# Patient Record
Sex: FEMALE | Race: WHITE | NOT HISPANIC OR LATINO | Employment: FULL TIME | URBAN - METROPOLITAN AREA
[De-identification: names, ages, dates, MRNs, and addresses within clinical notes are randomized per-mention and may not be internally consistent; named-entity substitution may affect disease eponyms.]

---

## 2017-01-03 ENCOUNTER — GENERIC CONVERSION - ENCOUNTER (OUTPATIENT)
Dept: OTHER | Facility: OTHER | Age: 37
End: 2017-01-03

## 2017-06-26 ENCOUNTER — TRANSCRIBE ORDERS (OUTPATIENT)
Dept: ADMINISTRATIVE | Facility: HOSPITAL | Age: 37
End: 2017-06-26

## 2017-06-26 DIAGNOSIS — R19.00 ABDOMINAL OR PELVIC SWELLING, MASS OR LUMP, UNSPECIFIED SITE: Primary | ICD-10-CM

## 2017-07-05 ENCOUNTER — HOSPITAL ENCOUNTER (OUTPATIENT)
Dept: ULTRASOUND IMAGING | Facility: HOSPITAL | Age: 37
Discharge: HOME | End: 2017-07-05
Attending: INTERNAL MEDICINE
Payer: COMMERCIAL

## 2017-07-05 DIAGNOSIS — R19.00 ABDOMINAL OR PELVIC SWELLING, MASS OR LUMP, UNSPECIFIED SITE: ICD-10-CM

## 2017-07-05 PROCEDURE — 76705 ECHO EXAM OF ABDOMEN: CPT

## 2017-07-05 PROCEDURE — 76830 TRANSVAGINAL US NON-OB: CPT

## 2017-07-05 PROCEDURE — 76856 US EXAM PELVIC COMPLETE: CPT

## 2018-01-03 ENCOUNTER — GENERIC CONVERSION - ENCOUNTER (OUTPATIENT)
Dept: OTHER | Facility: OTHER | Age: 38
End: 2018-01-03

## 2018-01-05 ENCOUNTER — GENERIC CONVERSION - ENCOUNTER (OUTPATIENT)
Dept: OTHER | Facility: OTHER | Age: 38
End: 2018-01-05

## 2018-01-08 ENCOUNTER — ALLSCRIPTS OFFICE VISIT (OUTPATIENT)
Dept: OTHER | Facility: OTHER | Age: 38
End: 2018-01-08

## 2018-01-09 NOTE — PROCEDURES
Assessment   1  Vaginal bleeding in pregnant patient at less than 20 weeks gestation (640 90) (O20 9)1      1 Amended By: Brianna Wiggins; Jan 08 2018 11:23 AM EST      Discussion/Summary   Discussion Summary:    LMP was 11/18/2017, due date is 08/25/2018 had brown discharge  Active Problems   1  Breast feeding status of mother (V24 1) (Z39 1)  2  Positive urine pregnancy test (V72 42) (Z32 01)  3  Postpartum exam (V24 2) (Z39 2)  4  Vaginal bleeding in pregnant patient at less than 20 weeks gestation (640 90) (O20 9)    Current Meds   1  Breast Pump Miscellaneous; Therapy: [id number] to Recorded  2  CVS DHA Prenatal CAPS; Therapy: (Recorded:06Ihe2584) to Recorded  3  Folic Acid CAPS; TAKE 1 CAPSULE Daily Per Patient; Therapy: (Recorded:70Ukr7382) to Recorded    Allergies   1  No Known Drug Allergies  2  Seasonal    Results/Data   85294 Transvaginal Ultrasound OB [facility]:      Procedure: [id number] pregnant uterus real time with image documentation, fetal and maternal evaluation, first trimester (<14 weeks 0 days), transvaginal approach: single or first gestation  Indication: EDC gestational age 10 3/11 weeks  Exam indication: bleeding  Findings:      Number of gestational sacs and fetuses: one  Gestational sac/fetal measurements appropriate for gestation: CRL is 081 cm equals 6 5/7 weeks with due date of 08/29/2018  Survey of visible fetal and placental anatomic structure cardiac motion is present  Qualitative assessment of amniotic fluid volume/gestational sac shape: nl       Exam of maternal uterus and adnexa: nl       Impression: viable IUP        Signatures    Electronically signed by : JELENA Murdock ; Jan 8 2018 11:22AM EST                       (Author)     Electronically signed by : JELENA Murdock ; Jan 8 2018 11:23AM EST                       (Author)

## 2018-01-11 NOTE — PROGRESS NOTES
AUG 17 2016         RE: Kevin Rai                                    To: [facility]  MR#: [id number]                                    [address]   : [address]   ENC: [id number]:VIANNEY Calzada, [address]   (Exam #: [id number])                           Fax: [phone]      The LMP of this 37year old,  G1, P0-0-0-0 patient was 2016, giving   her an ANN-MARIE of NOV 10 2016 and a current gestational age of 32 weeks 6 days   by dates  A sonographic examination was performed on AUG 17 2016 using   real time equipment  The ultrasound examination was performed using   abdominal & vaginal techniques  The patient has a BMI of 25 6  Her blood   pressure today was 126/66  Earliest ultrasound found in her record: 16  10w6d  ANN-MARIE 11/10/16      Cardiac motion was observed at 146 bpm       INDICATIONS      prior LEEP   low lying placenta   placental location      Exam Types      Transvaginal   Level I      RESULTS      Fetus # 1 of 1   Vertex presentation   Fetal growth appeared normal   Placenta Location = Anterior   No placenta previa   Placenta Grade = I      MEASUREMENTS (* Included In Average GA)      AC              23 9 cm        28 weeks 1 day * (51%)   BPD              6 7 cm        27 weeks 0 days* (22%)   HC              25 3 cm        27 weeks 1 day * (23%)   Femur            5 6 cm        29 weeks 4 days* (67%)      HC/AC           1 06   FL/AC           0 23   FL/BPD          0 84   Ceph Index      0 79   EFW (Ac/Fl/Hc)  1239 grams - 2 lbs 12 oz                 (56%)      THE AVERAGE GESTATIONAL AGE is 28 weeks 0 days +/- 14 days        AMNIOTIC FLUID      Q1: 4 5      Q2: 3 4      Q3: 4 5      Q4: 4 7   ANDREW Total = 17 1 cm   Amniotic Fluid: Normal      ANATOMY DETAILS      Visualized Appearing Sonographically Normal:   HEAD: (Calvarium, BPD Level, Lateral Ventricles, Cerebellum, Cisterna   Magna); TH  CAV : (Diaphragm); HEART: (Cardiac Axis, Cardiac   Position);    ABD  CAV , STOMACH, RIGHT KIDNEY, LEFT KIDNEY, BLADDER,   PLACENTA      ANATOMY COMMENTS      The cervix seen by transvaginal scan appears normal measuring 26-30 mms  There is no evidence for spontaneous funneling of the cervix seen  There   is no funneling seen in response to fundal or suprapubic pressure  IMPRESSION      Blanca IUP   28 weeks and 0 days by this ultrasound  (ANN-MARIE=NOV 9 2016)   Vertex presentation   Fetal growth appeared normal   Regular fetal heart rate of 146 bpm   Anterior placenta   No placenta previa      RECOMMENDATION      Growth Ultrasound: As indicated      RUTH Tatum S , R VIKY MOORE S  JELENA Stokes     Maternal-Fetal Medicine   Electronically signed 08/17/16 17:57

## 2018-01-11 NOTE — MISCELLANEOUS
Message   Recorded as Task   Date: 01/03/2017 09:43 AM, Created By: Mikhail Souza   Task Name: Miscellaneous   Assigned To: Les Nose   Regarding Patient: Gladis Sosa, Status: In Progress   CommentCheryle Eaton - 03 Jan 2017 9:43 AM     TASK CREATED  PT NEEDS A RTW NOTE FOR WORK FAXED -[phone]  Bashir Romeo - 03 Jan 2017 11:45 AM     TASK IN PROGRESS   Grecia Diaz - 03 Jan 2017 11:46 AM     TASK EDITED  lmtcb what kind of note she is ppar   [place] - 16 Jan 2017 11:53 AM     TASK EDITED  ppar note faxed        Active Problems    1  Breast feeding status of mother (V24 1) (Z39 1)   2  Postpartum exam (V24 2) (Z39 2)    Current Meds   1  Breast Pump Miscellaneous; Therapy: [id number] to Recorded   2  CVS DHA Prenatal CAPS; Therapy: (Recorded:48Kzz6467) to Recorded   3  Folic Acid CAPS; TAKE 1 CAPSULE Daily Per Patient; Therapy: (Recorded:10Wvd9722) to Recorded    Allergies    1  No Known Drug Allergies    2   Seasonal    Signatures   Electronically signed by : Matt Fischer, ; Jim  3 2017 11:53AM EST                       (Author)

## 2018-01-12 NOTE — PROGRESS NOTES
2016         RE: Fatuma Hackett                                    To: [facility]  MR#: [id number]                                    [address]   : [address]   ENC: [id number]                             Zheng, [address]   (Exam #: [id number])                           Fax: [phone]      The LMP of this 37year old,  G1, P0-0-0-0 patient was 2016, giving   her an ANN-MARIE of NOV 10 2016 and a current gestational age of 24 weeks 6 days   by dates  A sonographic examination was performed on 2016 using   real time equipment  The ultrasound examination was performed using   abdominal & vaginal techniques  The patient has a BMI of 23 4  Her blood   pressure today was 101/64        Earliest ultrasound found in her record: 16  10w6d  ANN-MARIE 11/10/16      Cardiac motion was observed at 144 bpm       INDICATIONS      prior LEEP   fetal anatomical survey      Exam Types      LEVEL II   Transvaginal      RESULTS      Fetus # 1 of 1   Breech presentation   Placenta Location = Anterior, low lying      MEASUREMENTS (* Included In Average GA)      AC              13 8 cm        18 weeks 6 days* (33%)   BPD              4 3 cm        19 weeks 0 days* (30%)   HC              16 3 cm        19 weeks 0 days* (22%)   Femur            3 2 cm        20 weeks 1 day * (47%)      Nuchal Fold      2 7 mm      Humerus          2 9 cm        19 weeks 3 days  (41%)   Radius           2 4 cm        19 weeks 6 days   Ulna             2 7 cm        20 weeks 0 days   Tibia            2 6 cm        19 weeks 1 day   (17%)   Fibula           2 6 cm        18 weeks 5 days   Foot             2 9 cm        18 weeks 6 days      Cerebellum       2 0 cm        19 weeks 6 days   Biorbit          3 1 cm        20 weeks 0 days   CisternaMagna    5 0 mm      HC/AC           1 18   FL/AC           0 23   FL/BPD          0 75   Ceph Index      0 74   EFW (Ac/Fl/Hc)   296 grams - 0 lbs 10 oz      THE AVERAGE GESTATIONAL AGE is 19 weeks 2 days +/- 10 days  AMNIOTIC FLUID      Q1: 3 9      Q2:          Q3:          Q4:   ANDREW Total = 3 9 cm   Largest Vertical Pocket = 3 9 cm      CERVICAL EVALUATION      SUPINE      Cervical Length: 3 30 cm      OTHER TEST RESULTS           Funneling?: No             Dynamic Changes?: No        Resp  To TFP?: No      ANATOMY      Head                                    Normal   Face/Neck                               Normal   Th  Cav  Normal   Heart                                   Normal   Abd  Cav  Normal   Stomach                                 Normal   Right Kidney                            Normal   Left Kidney                             Normal   Bladder                                 Normal   Abd  Wall                               Normal   Spine                                   Normal   Extrems                                 Normal   Genitalia                               Normal   Placenta                                Normal   Umbl  Cord                              Normal   Uterus                                  Normal   PCI                                     Normal      ANATOMY DETAILS      Visualized Appearing Sonographically Normal:   HEAD: (Calvarium, BPD Level, Cavum, Lateral Ventricles, Choroid Plexus,   Cerebellum, Cisterna Magna);    FACE/NECK: (Neck, Nuchal Fold, Profile,   Orbits, Nose/Lips, Palate, Face);    TH  CAV : (Diaphragm); HEART:   (Four Chamber View, Proximal Left Outflow, Proximal Right Outflow, 3   Vessel Trachea, Short Axis of Greater Vessels, Ductal Arch, Aortic Arch,   Interventricular Septum, Interatrial Septum, Cardiac Axis, Cardiac   Position);    ABD  CAV , STOMACH, RIGHT KIDNEY, LEFT KIDNEY, BLADDER, ABD     WALL, SPINE: (Cervical Spine, Thoracic Spine, Lumbar Spine, Sacrum);      EXTREMS: (Lt Humerus, Rt Humerus, Lt Forearm, Rt Forearm, Lt Hand, Rt   Hand, Lt Femur, Rt Femur, Lt Low Leg, Rt Low Leg, Lt Foot, Rt Foot);      GENITALIA (Female), PLACENTA, UMBL  CORD, UTERUS, PCI      ANATOMY COMMENTS      No fetal structural abnormality or ultrasound marker for aneuploidy is   identified on the Level II ultrasound study today  The views of the ***   are limited by ***  The genitalia were reviewed and found to be  female  The patient is  aware of the results of the fetal sex  Fetal growth and   amniotic fluid volume appear normal   Active movement of the fetal body &   extremities was seen  There is no suspicion of a subchorionic bleed  The   placental cord insertion was normal   The cervix seen by  transvaginal   scan appears normal measuring 3 3 cms  There is no evidence for   spontaneous funneling of the cervix seen  ADNEXA      The left ovary appeared normal and measured 1 9 x 2 3 x 1 5 cm with a   volume of 3 4 cc  The right ovary appeared normal and measured 3 2 x 2 3 x   2 5 cm with a volume of 9 6 cc  IMPRESSION      Blanca IUP   19 weeks and 2 days by this ultrasound  (ANN-MARIE=2016)   Breech presentation   Normal anatomy survey   Regular fetal heart rate of 144 bpm   Anterior, low lying placenta      GENERAL COMMENT         I had the pleasure of seeing Darcy Hatfield in the  center on    for level II ultrasound  She had a prior LEEP procedure and today her   cervix appeared very normal  Her BMI is 23 and her blood pressure today   was 101/64  She denies any pregnancy complications  Today's ultrasound was reassuring  A viable fetus was seen  The placenta   is anterior and low-lying as it does come to within 1 cm of the internal   os however there is no evidence of a placenta previa  Amniotic fluid   appeared normal  Fetal growth appeared very appropriate and correlated   well with her due date  There were no obvious anomalies seen today  The   anatomic survey was completed today   There were no Down syndrome markers   seen  Both maternal ovaries were seen and appeared normal  There were no   obvious subchorionic hematomas or uterine myomas  Her cervix was seen   transvaginally and appeared normal in length with no evidence of funneling   or dynamic change  The placental cord insertion were seen and was normal   in location  The patient appeared well-nourished, well-developed, and in no apparent   distress  Her uterus is nontender  Her abdomen was gravid and nontender  The anatomic survey was completed today and there were no obvious anatomic   abnormalities  We discussed kick counts in the office today  We discussed the 10 kicks in   2 hour rule  I asked her to call your office if criteria are not met  She   should continue to do her kick counts on a daily basis  Kick counts should   begin at 28 weeks  MISSED ANATOMY: None      NEW FINDINGS ON TODAY'S ULTRASOUND: Anterior low lying placenta which   comes to within 1 cm of the internal os and thus a follow-up ultrasound   appears indicated      IN SUMMARY: Today's ultrasound was overall very reassuring for Del Garcia  The   fetus showed no obvious birth defects nor any Down syndrome markers and   the anatomic survey was completed  Her cervix was 33 mm in length however   the placenta does come down somewhat low and comes to within 1 cm of the   internal os and thus a follow-up ultrasound appears indicated  I did   recommend an ultrasound in 3 weeks given her anterior placenta  Overall I   was still very reassured by today's ultrasound  Thank you kindly for this   referral          Total face-to-face time with the patient, excluding ultrasound time was 15   minutes with more than 50% of the time devoted to counseling and   coordination of care  Thank you very much for allowing me to participate in the care of your   patient  If you have any questions or concerns about today's visit, please   do not hesitate to call me  Sincerely,      Nestora Barthel, M D  Maternal Fetal Medicine      Liddie Parry, R D M S Nestora Barthel, M D     Maternal-Fetal Medicine   Electronically signed 06/22/16 13:14

## 2018-01-13 NOTE — PROGRESS NOTES
MAY 5 2016         RE: Gerry Tee                                    To: [facility]  MR#: [id number]                                    [address]   : [address]   ENC: [id number]:ABIGAIL Calzada, [address]   (Exam #: [id number])                           Fax: [phone]      The LMP of this 37year old,  G1, P0-0-*-0 patient was 2016, giving   her an ANN-MARIE of NOV 10 2016 and a current gestational age of 17 weeks 0 days   by dates  A sonographic examination was performed on MAY 5 2016 using real   time equipment  The ultrasound examination was performed using abdominal &   vaginal techniques  The patient has a BMI of 23 0  Her blood pressure   today was 109/67  Earliest ultrasound found in her record: 16  10w6d  ANN-MARIE 11/10/16      Patient is a  1 para 0  She currently is on prenatal vitamins and   folic acid and denies any allergies to medication  She denies any use of   cigarettes, alcohol, or drugs in pregnancy  She reports a past medical   history significant for HPV which required a LEEP in  that measured   1 8 x 1 8 x 1 5 cm on the pathology report  She also reports a prior   surgical history that includes repair of an abdominal hernia as well as   her LEEP  She denies any significant first generation family history of   hypertension, diabetes, thrombosis or congenital anomalies  Multiple longitudinal and transverse sections revealed a hogue   intrauterine pregnancy with the fetus in variable presentation  The   placenta is anterior in implantation, grade 0 in appearance        Cardiac motion was observed at 157 bpm       INDICATIONS      first trimester genetic screening   prior LEEP      Exam Types      Level I   Transvaginal      RESULTS      Fetus # 1 of 1   Variable presentation   Fetal growth appeared normal      MEASUREMENTS (* Included In Average GA)      CRL detecting all forms of fetal congenital   abnormalities  She denies any vaginal bleeding or uterine cramping/contractions  Exam shows she is comfortable and her abdomen is non tender  Today's ultrasound findings and suggested follow-up were discussed in   detail with the patient  The Sequential Screen was discussed in detail,   including the sensitivities for detection of Down syndrome, Trisomy 18,   and open neural tube defects  We also discussed the Integrated screen as   the fetal position made obtaining an NT measurement impossible  The   integrated screen utilizes the first trimester blood work ( without an NT   measurement) and the multiple marker screening at 16-18 weeks to assess   the patients risk just like the sequential screen does  Only one result   though returns after all the blood work is completed  The patient   underwent fingerstick blood collection for hCG and LETTY-A to complete the   initial component of the Integrated Screen  The final result will be back   after the multiple marker serum screening at 16-18 weeks is completed  Follow up recommended:   1  Follow-up multiple marker serum screening at 16-18 weeks' gestation is   recommended to complete the Integrated Screen  2  Fetal Level II ultrasound imaging is recommended at 19-20 weeks'   gestation  3  LEEP does not appear to increase the risk of  mortality or of    delivery less then or =34 weeks as long as her leep is < 1 cm  There may be an increased risk of late  delivery in women who have   undergone LEEP  Again the depth of excision appears to influence the risk   of  delivery  Since her leep was deeper then 1 cm will plan a TVS   at 16-17 weeks  If this pregnancy is not complicated by a  delivery   then future pregnancies will not require cervical surveillance beyond the   normal 20 week screen  RUTH Baeza M D     Maternal-Fetal Medicine   Electronically signed 05/06/16 11:26           Electronically signed by:Flip SALMON    May  7 2016  6:49AM EST

## 2018-01-13 NOTE — PROGRESS NOTES
MAY 26 2016         RE: Yobani Gonzalez                                    To: [facility]  MR#: [id number]                                    [address]   : [address]   ENC: [id number] Calzada, [address]   (Exam #: [id number])                           Fax: [phone]      The LMP of this 37year old,  G1, P0-0-0-0 patient was 2016, giving   her an ANN-MARIE of NOV 10 2016 and a current gestational age of 12 weeks 0 days   by dates  A sonographic examination was performed on MAY 26 2016 using   real time equipment  The ultrasound examination was performed using   abdominal & vaginal techniques  The patient has a BMI of 23 2  Her blood   pressure today was 99/56  Earliest ultrasound found in her record: 16  10w6d  ANN-MARIE 11/10/16      Padilla Poll has no complaints  She denies abdominal or pelvic pain, vaginal   bleeding, mucoid vaginal discharge, or suspected PPROM  Cardiac motion was observed at 145 bpm       INDICATIONS      prior LEEP      Exam Types      Transvaginal      RESULTS      Fetus # 1 of 1   Variable presentation   Placenta Location = Anterior   Placenta Grade = 0      AMNIOTIC FLUID         Largest Vertical Pocket = 3 9 cm   Amniotic Fluid: Normal      CERVICAL EVALUATION      The cervix appeared normal (Ultrasound Examination)  SUPINE      Cervical Length: 3 30 cm      OTHER TEST RESULTS           Funneling?: No             Dynamic Changes?: No        Resp  To TFP?: No      IMPRESSION      Blanca IUP   Variable presentation   Regular fetal heart rate of 145 bpm   Anterior placenta      GENERAL COMMENT      The cervix is normal in appearance by transvaginal sonography  The   cervical length is normal   Cervical debris is not present  Cervical   funneling is not present  Neither provocative nor dynamic change is   appreciated        Today's ultrasound findings and suggested follow-up were discussed in   detail with Karlos Hager  She will return to the [facility] [address] [facility] at the [facility]  in 4 weeks for   level II ultrasound evaluation and cervical sonography  The face to face time, in addition to time spent discussing ultrasound   results, was 10 minutes, greater than 50% of which was spent during   counseling and coordination of care  RUTH Baeza M D     Maternal-Fetal Medicine   Electronically signed 05/26/16 16:07

## 2018-01-14 NOTE — RESULT NOTES
Verified Results  (1) SEQUENTIAL SCREEN 2 [id number] 04:36PM Chloé Jacinto     Test Name Result Flag Reference   SCAN RESULT      SEE WRITTEN REPORT FROM [facility]

## 2018-01-15 NOTE — PROGRESS NOTES
2016         RE: Emily Stager                                    To: [facility]  MR#: [id number]                                    [address]   : [address]   ENC: [id number]:DOROTHEA Abbasi, [address]   (Exam #: [id number])                           Fax: [phone]      The LMP of this 37year old,  G1, P0-0-0-0 patient was 2016, giving   her an ANN-MARIE of NOV 10 2016 and a current gestational age of 25 weeks 6 days   by dates  A sonographic examination was performed on 2016 using   real time equipment  The ultrasound examination was performed using   abdominal & vaginal techniques  The patient has a BMI of 24 1  Her blood   pressure today was 109/68  Earliest ultrasound found in her record: 16  10w6d  ANN-MARIE 11/10/16      Cardiac motion was observed at 142 bpm       INDICATIONS      prior LEEP   placental location      Exam Types      Transvaginal      RESULTS      Fetus # 1 of 1   Vertex presentation   Placenta Location = Anterior, low lying   Placenta Grade = I      AMNIOTIC FLUID         Largest Vertical Pocket = 5 1 cm   Amniotic Fluid: Normal      ANATOMY COMMENTS      The cervix seen by transvaginal scan appears normal measuring 33 mms  There is no evidence for spontaneous funneling of the cervix seen  There   is no funneling seen in response to fundal or suprapubic pressure  the   placenta measures approximately 1 5cm from the internal os  IMPRESSION      Blanca IUP   Vertex presentation   Regular fetal heart rate of 142 bpm   Anterior, low lying placenta      GENERAL COMMENT      I had the pleasure of seeing Pattinathalyhoracio Stager in the [address] [place] [address] today for   a transvaginal assessment of her cervix  She denies vaginal bleeding,   uterine contractions, pelvic pressure, or mucoid type discharge        Note that the placenta is 1 5 cm away from the internal os which is reassuring  Her cervical length today was 33 mm which is also reassuring  There was no signs of funneling or dynamic changes of her cervix  There   was no change in the appearance of her cervix with transfundal or   suprapubic pressure  Thus her placenta is still 1 5 cm away from the internal os and I did   recommend a follow-up scan in 4 weeks to further review placental   location  Thank you kindly for this referral  Total face-to-face time with   the patient, excluding ultrasound time was 10 minutes with more than 50%   of the time devoted to counseling and coordination of care  RUTH Maria M D     Maternal-Fetal Medicine   Electronically signed 07/13/16 16:55

## 2018-01-15 NOTE — PROGRESS NOTES
Plan  Pregnancy, supervision of first    · (B) PAP/CT/GC (RFLX HPV PLUS when ASC-US); Status: In Progress - Specimen/Data  Collected,Retrospective Authorization;   Done: 20Apr2016   Perform:BioReference; Due:20Apr2017; Last Updated Alana Rodriguez; 4/20/2016 10:34:02 AM;Ordered; For:Pregnancy, supervision of first; Ordered By:Flip Larios;  : 2/4/2016    Active Problems    1  H/O LEEP (loop electrosurgical excision procedure) of cervix complicating pregnancy   (654 60,V45 89) (O34 40,Z98 89)   2  Denied: History of Menopause   3  Need for prophylactic vaccination with combined diphtheria-tetanus-pertussis (DTaP)   vaccine (V06 1) (Z23)   4  Possible exposure to STD (V01 6) (Z20 2)   5  Pregnancy, supervision of first (V22 0) (Z34 00)    Past Medical History    1  History of Dysmenorrhea (625 3) (N94 6)   2  History of Encounter for preconception consultation (V26 49) (Z31 69)   3  History of Encounter for routine gynecological examination (V72 31) (Z01 419)   4  History of Encounter for routine gynecological examination with Papanicolaou smear of   cervix (V72 31,V76 2) (Z01 419)   5  History of Genital warts (078 11) (A63 0)   6  History of Hernia (553 9) (K46 9)   7  History of human papillomavirus infection (V12 09) (Z86 19)   8  Denied: History of Menopause   9  History of Mild cervical dysplasia (622 11) (N87 0)   10  History of Moderate dysplasia of cervix (ALISHA II) (622 12) (N87 1)    Surgical History    1  History of Cervical Loop Electrosurgical Excision (LEEP)   2  History of Colposcopy Cervix With Biopsy(S)   3  History of Oral Surgery Tooth Extraction   4  History of Oral Surgery Tooth Extraction   5  History of Pap smear with atypical squamous cells of undetermined sign (ASC-US)   (796 9) (R89 6)   6  History of Ventral Hernia Repair    Family History  Mother    1  Family history of Malignant Melanoma Of The Skin (V16 8)  Father    2   Family history of Glomus Jugulare Tumor  Maternal Grandmother    3  Family history of Diabetes Mellitus (V18 0)   4  Family history of Heart Disease (V17 49)  Paternal Grandmother    5  Family history of Alzheimer Disease  Paternal Grandfather    6  Family history of Colon Cancer (V16 0)  Paternal Aunt    9  Family history of Breast Cancer (V16 3)    Social History    · Almost always uses seat belt   · Being A Social Drinker   · Daily Coffee Consumption (1  Cups/Day)   · Denied: History of Drug Use   · Exercising Regularly   ·    · Never A Smoker    Current Meds   1  CVS DHA Prenatal CAPS; Therapy: (Recorded:20Apr2016) to Recorded    Allergies    1  No Known Drug Allergies    2  Seasonal    Vitals  Vital Signs [Data Includes: Current Encounter]    Recorded: 31MQZ6402 68:92XX   Systolic 449, LUE, Sitting   Diastolic 62, LUE, Sitting   Height 5 ft 2 in   Weight 125 lb    BMI Calculated 22 86   BSA Calculated 1 57   LMP 05RUH2304     Results/Data  89571 Transvaginal Ultrasound OB [facility]:   Procedure: 81326-HKOJPIZAQC pregnant uterus real time with image documentation, fetal and maternal evaluation, first trimester (<14 weeks 0 days), transvaginal approach: single or first gestation  The study was done today in the office  Indication: EDC gestational age 5 weeks  Exam indication: DATING AND VIABILITY  Findings:   Number of gestational sacs and fetuses: ONE  Gestational sac/fetal measurements appropriate for gestation: CRL=4 01=21Z1Y=94/10/16, + HEART BEAT  Survey of visible fetal and placental anatomic structure NORMAL  Qualitative assessment of amniotic fluid volume/gestational sac shape: NORMAL  Exam of maternal uterus and adnexa: NORMAL  Impression: VIABLE IUP @ 10W6D  Future Appointments    Date/Time Provider Specialty Site   06/15/2016 02:40 PM JELENA Kim   Obstetrics/Gynecology [facility]   07/15/2016 03:40 PM Sharron Turner DO Obstetrics/Gynecology [facility]   05/19/2016 03:00 PM Artur Olson, [facility] Obstetrics/Gynecology [facility] & [address], [address]     Signatures   Electronically signed by : JELENA Blanca ; Apr 20 2016 11:21AM EST                       (Author)

## 2018-01-16 NOTE — MISCELLANEOUS
Message  Pt is 34 wks  Calling today with a left sided vulvar varicosity  Pt reports feeling very sore and uncomfortable on the left vulva  Pt looked in the mirror after shaving today and she did see a dark purple, bulging vein  Pt states it vein feels hot and is sore to the touch  Pts Sister is getting  this weekend and she is worried about the pressure of standing for that time  Advised pt to use Belly Band for support, decrease time sitting with direct downward pressure, sleep with pillow between legs  Pt also advised to avoid shaving in that area  Pt scheduled today with [facility] for evaluation due to increased heat and discomfort and for pts peace of mind  Active Problems    1  Abnormal glucose in pregnancy, antepartum (648 83) (O99 810)   2  Encounter for supervision of other normal pregnancy in third trimester (V22 1) (Z34 83)   3  H/O LEEP (loop electrosurgical excision procedure) of cervix complicating pregnancy   (654 60,V45 89) (O34 40,Z98 89)   4  Need for prophylactic vaccination with combined diphtheria-tetanus-pertussis (DTaP)   vaccine (V06 1) (Z23)   5  Pregnancy (V22 2) (Z33 1)    Current Meds   1  CVS DHA Prenatal CAPS; Therapy: (Recorded:35Hrl1761) to Recorded   2  Folic Acid CAPS; TAKE 1 CAPSULE Daily Per Patient; Therapy: (Recorded:32Tyg7931) to Recorded    Allergies    1  No Known Drug Allergies    2   Seasonal    Signatures   Electronically signed by : Zeynep Washington, ; Sep 29 2016  9:22AM EST                       (Author)

## 2018-01-16 NOTE — MISCELLANEOUS
Message   Recorded as Task   Date: 08/24/2016 02:14 PM, Created By: Marina Alonzo   Task Name: Follow Up   Assigned To: Keshia Redman   Regarding Patient: Alexus Hemphill, Status: In Progress   CommentLawerence Brennen - 24 Aug 2016 2:14 PM     TASK CREATED  schedule 3hr GTT   Tempie Jeans - 24 Aug 2016 3:47 PM     TASK IN PROGRESS   Tempie Jeans - 24 Aug 2016 3:47 PM     TASK EDITED  formerly [facility] for pt to call office  Elizabeth Mcgee - 24 Aug 2016 4:04 PM     TASK REPLIED TO: Previously Assigned To [address] with pt  She has rx from apt with [facility]  Instructions given  Will go in the next week  Active Problems    1  H/O LEEP (loop electrosurgical excision procedure) of cervix complicating pregnancy   (654 60,V45 89) (O34 40,Z98 89)   2  Need for prophylactic vaccination with combined diphtheria-tetanus-pertussis (DTaP)   vaccine (V06 1) (Z23)   3  Pregnancy, supervision of first (V22 0) (Z34 00)    Current Meds   1  CVS DHA Prenatal CAPS; Therapy: (Recorded:99Ruw8201) to Recorded   2  Folic Acid CAPS; TAKE 1 CAPSULE Daily Per Patient; Therapy: (Recorded:54Xsr5713) to Recorded    Allergies    1  No Known Drug Allergies    2   Seasonal    Signatures   Electronically signed by : Dimas Monroe, ; Aug 24 2016  4:04PM EST                       (Author)

## 2018-01-16 NOTE — MISCELLANEOUS
Message  Pt called office after apt with [facility]  Pt would like to know if she can still have intercourse if desired  Per [facility]  it is ok for pt to continue to have intercourse  Pt is aware  Active Problems    1  Abnormal glucose in pregnancy, antepartum (648 83) (O99 810)   2  Encounter for supervision of other normal pregnancy in third trimester (V22 1) (Z34 83)   3  H/O LEEP (loop electrosurgical excision procedure) of cervix complicating pregnancy   (654 60,V45 89) (O34 40,Z98 89)   4  Need for prophylactic vaccination with combined diphtheria-tetanus-pertussis (DTaP)   vaccine (V06 1) (Z23)   5  Pregnancy (V22 2) (Z33 1)    Current Meds   1  CVS DHA Prenatal CAPS; Therapy: (Recorded:58Tju8654) to Recorded   2  Folic Acid CAPS; TAKE 1 CAPSULE Daily Per Patient; Therapy: (Recorded:90Ccn6386) to Recorded    Allergies    1  No Known Drug Allergies    2   Seasonal    Signatures   Electronically signed by : Yanira Mcclain, ; Sep 29 2016  3:02PM EST                       (Author)

## 2018-01-22 ENCOUNTER — LAB REQUISITION (OUTPATIENT)
Dept: LAB | Facility: HOSPITAL | Age: 38
End: 2018-01-22
Payer: COMMERCIAL

## 2018-01-22 ENCOUNTER — ALLSCRIPTS OFFICE VISIT (OUTPATIENT)
Dept: OTHER | Facility: OTHER | Age: 38
End: 2018-01-22

## 2018-01-22 VITALS
SYSTOLIC BLOOD PRESSURE: 108 MMHG | HEIGHT: 62 IN | DIASTOLIC BLOOD PRESSURE: 64 MMHG | BODY MASS INDEX: 23.15 KG/M2 | WEIGHT: 125.8 LBS

## 2018-01-22 DIAGNOSIS — Z34.83 ENCOUNTER FOR SUPERVISION OF OTHER NORMAL PREGNANCY, THIRD TRIMESTER: ICD-10-CM

## 2018-01-22 DIAGNOSIS — Z34.81 ENCOUNTER FOR SUPERVISION OF OTHER NORMAL PREGNANCY, FIRST TRIMESTER: ICD-10-CM

## 2018-01-22 PROCEDURE — 87491 CHLMYD TRACH DNA AMP PROBE: CPT | Performed by: OBSTETRICS & GYNECOLOGY

## 2018-01-22 PROCEDURE — 87591 N.GONORRHOEAE DNA AMP PROB: CPT | Performed by: OBSTETRICS & GYNECOLOGY

## 2018-01-23 NOTE — MISCELLANEOUS
Message  returned pts' p c - L/M for pt to call office prior to 630pm today  Active Problems    1  Breast feeding status of mother (V24 1) (Z39 1)   2  Postpartum exam (V24 2) (Z39 2)   3  Vaginal bleeding in pregnant patient at less than 20 weeks gestation (640 90) (O20 9)    Current Meds   1  Breast Pump Miscellaneous; Therapy: 13VTT3873 to Recorded   2  CVS DHA Prenatal CAPS; Therapy: (Recorded:00Qfz2244) to Recorded   3  Folic Acid CAPS; TAKE 1 CAPSULE Daily Per Patient; Therapy: (Recorded:93Hqp9763) to Recorded    Allergies    1  No Known Drug Allergies    2   Seasonal    Signatures   Electronically signed by : Adelaida Gill RN; Jim  3 2018  4:02PM EST                       (Author)

## 2018-01-23 NOTE — MISCELLANEOUS
Message   Recorded as Task   Date: 01/05/2018 04:01 PM, Created By: Greg Frederick   Task Name: Care Coordination   Assigned To: Greg Frederick   Regarding Patient: Nestor Iglesias, Status: In Progress   Elly Lomeli - 05 Jan 2018 4:01 PM     TASK CREATED  Caller: Self; Care Coordination; [phone] (Home); [phone] (Work)  Patient called for HCG results  She went to [facility]  Verbal from [facility]- [id number]  Spoke with Renan Welch she is to have a early ultrasound  Will schedule  Greg Frederick - 05 Jan 2018 4:08 PM     TASK IN PROGRESS   Greg Frederick - 05 Jan 2018 4:14 PM     TASK EDITED  Patient was breastfeeding and had a LMP on 11/18/17  She was having breast tenderness and abnormal spotting  Greg Frederick - 05 Jan 2018 4:18 PM     TASK EDITED  Patient scheduled with [facility] on 1/8/18 @ 10:40 for early ultrasound  Active Problems    1  Breast feeding status of mother (V24 1) (Z39 1)   2  Positive urine pregnancy test (V72 42) (Z32 01)   3  Postpartum exam (V24 2) (Z39 2)   4  Vaginal bleeding in pregnant patient at less than 20 weeks gestation (640 90) (O20 9)    Current Meds   1  Breast Pump Miscellaneous; Therapy: 04NFW5839 to Recorded   2  CVS DHA Prenatal CAPS; Therapy: (Recorded:32Arh2241) to Recorded   3  Folic Acid CAPS; TAKE 1 CAPSULE Daily Per Patient; Therapy: (Recorded:36Sbj6175) to Recorded    Allergies    1  No Known Drug Allergies    2  Seasonal    Signatures   Electronically signed by :  Amy Ramos, ; Jan 5 2018  4:19PM EST                       (Author)

## 2018-01-23 NOTE — MISCELLANEOUS
Message   Recorded as Task   Date: 01/03/2018 04:26 PM, Created By: Louise Abraham   Task Name: Follow Up   Assigned To: Iqra Lipscomb   Regarding Patient: Alexandre Yuen, Status: In Progress   Comment:    Iqra Lipscomb - 03 Jan 2018 4:26 PM     TASK CREATED  Patient returned Jan's call - she delivered in November 2016 - has been breast feeding till November 2017  In Jan 2017 patient got her period - has been getting it every month like clock work - around the 17th of every month  In November she didn't get her period - took 2 HPT, both positive  Called us, was told to do a HCG Quant - came back Negative  Few days later had some spotting  IN December, patient again did not have her period  Waited for few days and did HPT - was positive  Is currently having some spotting again, but it is brown in color  No cramping  Patient is sexually active, no birth control used  Wants another baby  Faxed order for HCG to [facility] on [facility] to be done tomorrow  When results are available, patient will be notified  Iqra Lipscomb - 03 Jan 2018 4:26 PM     TASK IN PROGRESS   Iqra Lipscomb - 03 Jan 2018 4:28 PM     TASK EDITED  Will fax order tomorrow, office currently closed  Iqra Lipscomb - 03 Jan 2018 4:37 PM     TASK EDITED  Patient called back - she will be going to [facility] in [place] - she gave me the number  Called and faxed order for the patient to do tomorrow  Active Problems    1  Breast feeding status of mother (V24 1) (Z39 1)   2  Positive urine pregnancy test (V72 42) (Z32 01)   3  Postpartum exam (V24 2) (Z39 2)   4  Vaginal bleeding in pregnant patient at less than 20 weeks gestation (640 90) (O20 9)    Current Meds   1  Breast Pump Miscellaneous; Therapy: 96QQG5600 to Recorded   2  CVS DHA Prenatal CAPS; Therapy: (Recorded:20Apr2016) to Recorded   3  Folic Acid CAPS; TAKE 1 CAPSULE Daily Per Patient; Therapy: (Recorded:25Yiv5505) to Recorded    Allergies    1   No Known Drug Allergies    2   Seasonal    Signatures   Electronically signed by : Lavinia Navarrete, ; Jim  3 2018  4:37PM EST                       (Author)

## 2018-01-25 LAB
CHLAMYDIA DNA CVX QL NAA+PROBE: NORMAL
N GONORRHOEA DNA GENITAL QL NAA+PROBE: NORMAL

## 2018-02-01 ENCOUNTER — TELEPHONE (OUTPATIENT)
Dept: OBGYN CLINIC | Facility: CLINIC | Age: 38
End: 2018-02-01

## 2018-02-02 ENCOUNTER — TELEPHONE (OUTPATIENT)
Dept: OBGYN CLINIC | Facility: CLINIC | Age: 38
End: 2018-02-02

## 2018-02-02 NOTE — TELEPHONE ENCOUNTER
Pt called office today, left voicemail message  Pt's ANN-MARIE is 8/25/18, GA 10-6  Pt saw Dr Laura Gallego on 1/22/18  Pt states she recently had prenatal blood work done, wants to know if results have returned from the lab? Pt can be reached @ [phone]

## 2018-02-09 ENCOUNTER — CONSULT (OUTPATIENT)
Dept: PERINATAL CARE | Facility: CLINIC | Age: 38
End: 2018-02-09
Payer: COMMERCIAL

## 2018-02-09 ENCOUNTER — ROUTINE PRENATAL (OUTPATIENT)
Dept: PERINATAL CARE | Facility: CLINIC | Age: 38
End: 2018-02-09
Payer: COMMERCIAL

## 2018-02-09 VITALS
WEIGHT: 127.3 LBS | HEIGHT: 61 IN | DIASTOLIC BLOOD PRESSURE: 65 MMHG | SYSTOLIC BLOOD PRESSURE: 99 MMHG | BODY MASS INDEX: 24.03 KG/M2 | HEART RATE: 69 BPM

## 2018-02-09 DIAGNOSIS — Z36.82 ENCOUNTER FOR NUCHAL TRANSLUCENCY TESTING: ICD-10-CM

## 2018-02-09 DIAGNOSIS — Z31.5 ENCOUNTER FOR PROCREATIVE GENETIC COUNSELING: Primary | ICD-10-CM

## 2018-02-09 DIAGNOSIS — O09.521 MATERNAL AGE 35+, MULTIGRAVIDA, FIRST TRIMESTER: ICD-10-CM

## 2018-02-09 DIAGNOSIS — Z3A.11 11 WEEKS GESTATION OF PREGNANCY: ICD-10-CM

## 2018-02-09 DIAGNOSIS — O09.521 ELDERLY MULTIGRAVIDA IN FIRST TRIMESTER: Primary | ICD-10-CM

## 2018-02-09 PROBLEM — O43.101: Status: ACTIVE | Noted: 2018-02-09

## 2018-02-09 PROCEDURE — 76801 OB US < 14 WKS SINGLE FETUS: CPT | Performed by: OBSTETRICS & GYNECOLOGY

## 2018-02-09 PROCEDURE — 76813 OB US NUCHAL MEAS 1 GEST: CPT | Performed by: OBSTETRICS & GYNECOLOGY

## 2018-02-09 PROCEDURE — 99204 OFFICE O/P NEW MOD 45 MIN: CPT | Performed by: GENETIC COUNSELOR, MS

## 2018-02-09 RX ORDER — PRENATAL VIT 91/IRON/FOLIC/DHA 28-975-200
1 COMBINATION PACKAGE (EA) ORAL DAILY
COMMUNITY
End: 2022-01-12 | Stop reason: ALTCHOICE

## 2018-02-09 NOTE — PROGRESS NOTES
Maternal-Fetal Medicine: Ms Will South was seen today in the [address] today for nuchal translucency ultrasound  Please see ultrasound report under "OB Procedures" tab in EPIC   Thank you for the referral and please don't hesitate to contact our office with any concerns or questions      Sincerely,  Sherita Potts MD  Attending Physician, Maternal-Fetal Medicine

## 2018-02-09 NOTE — PROGRESS NOTES
Genetic Counseling Note        Courtney Robbins     Appointment Date:  2/9/2018  Referred By: Livia Thibodeaux DO  YOB: 1980  Partner:  Aristides Emmanuel    Pregnancy History: [id number]  Estimated Date of Delivery: 8/25/18  Estimated Gestational Age: 5 weeks 6 days     Genetic Counseling:advanced maternal age    Sang Taylor is a(n) 30 y o  female who is here to discuss maternal age related risk for aneuploidy    Issues Discussed:    Average population risk: 3-4% in the average pregnancy of serious condition or birth defect  2-3% risk of mental retardation  Not all detected by prenatal testing  Chromosomal: non-disjunction- 1/178 risk overall, 1/384 risk for Down syndrome  Mode of inheritance/mechanism: CF, SMA, Fragile X and hemoglobinopathies  Maternal age  Risk of aneuploidy    Options Discussed:    Amniocentesis: risks and limitations discussed  CVS: risks and limitations discussed  Ethnic screening discussed: clinical and genetic basis of CF, SMA, Fragile X, homeoglobinopathies  Variability and treatment addressed  Level II ultrasound to screen for structural anomalies  Nuchal translucency/1st trimester serum screen: goals and limitations discussed  Serum AFP screen recommended at 15-17 weeks to check for open neural tube defects  Cell free fetal DNA testing    Additional Information / Impression / Plan / Tests Ordered:  After discussing the available prenatal diagnostic and screening procedures Sang Taylor elected to decline prenatal diagnostic testing at this time  She is scheduled to have nuchal translucency ultrasound following the genetic counseling session and planning to pursue level 2 ultrasound evaluation at the appropriate time  She is still undecided regarding sequential screening versus cell free DNA and would like additional time to confirm that decision      During our counseling session histories were taken on the patient's family and her 's family both of which were negative for any prior known cases of intellectual disability, birth defects or single gene disorders  [place] does report having a paternal aunt with early-onset breast cancer and an optic nerve tumor in her father  She was provided with the contact information for the cancer risk evaluation program for consideration in the future given this history  Patient reports being of [address] [place] [address] in descent and that her  is of [facility] [place] descent  She denies either of them having any known [place] ancestry  Carrier screening for CF, SMA, Fragile X and hemoglobinopathies was discussed  Lisa Al indicated that she wished to have further time to consider her carrier screening options  She was provided with CPT an ICD 10 codes for checking insurance coverage and instructed to get back to me if she elects to move forward with testing  Hemoglobinopathy screening is recommended if not previously performed  Lastly, we discussed the fact that it is important to keep in mind that everyone in the general population regardless of age, family history, or medical background has approximately a 3% risk of having a child with some type of her defect, genetic disease or intellectual disability  Currently there are no tests available to rule out all birth defects or health problems              Time spent with Genetic Counselor: 45 minutes

## 2018-02-21 ENCOUNTER — ROUTINE PRENATAL (OUTPATIENT)
Dept: OBGYN CLINIC | Facility: CLINIC | Age: 38
End: 2018-02-21

## 2018-02-21 VITALS — DIASTOLIC BLOOD PRESSURE: 64 MMHG | WEIGHT: 126 LBS | SYSTOLIC BLOOD PRESSURE: 108 MMHG | BODY MASS INDEX: 23.81 KG/M2

## 2018-02-21 DIAGNOSIS — Z34.82 ENCOUNTER FOR SUPERVISION OF NORMAL PREGNANCY IN MULTIGRAVIDA IN SECOND TRIMESTER: Primary | ICD-10-CM

## 2018-02-21 PROCEDURE — 87491 CHLMYD TRACH DNA AMP PROBE: CPT | Performed by: PHYSICIAN ASSISTANT

## 2018-02-21 PROCEDURE — PNV: Performed by: PHYSICIAN ASSISTANT

## 2018-02-21 PROCEDURE — 87591 N.GONORRHOEAE DNA AMP PROB: CPT | Performed by: PHYSICIAN ASSISTANT

## 2018-02-21 NOTE — PROGRESS NOTES
Subjective  Julien Acosta is being seen today for her first obstetrical visit  This is a planned pregnancy  She is at 13w4d gestation  Her obstetrical history is significant for C/S for failure to progress by Dr Bridgett Fernandez  Pregnancy history fully reviewed  Menstrual History:  OB History      Para Term  AB Living    2 1 1     1    SAB TAB Ectopic Multiple Live Births          0 1         Patient's last menstrual period was 2017  The following portions of the patient's history were reviewed and updated as appropriate: allergies, current medications, past family history, past medical history, past social history, past surgical history and problem list     Review of Systems  Review of Systems     Objective    Physical Exam     Assessment     Pregnancy at 15 and 4/7 weeks      Plan  Problem List Items Addressed This Visit     Encounter for supervision of normal pregnancy in multigravida in second trimester - Primary     RTO 4 weeks  OebtyneE59 results pending  Desires repeat C/S            Genetic testing discussed: saw [facility] had [id number]  Level 2 US scheduled? Yes  Reviewed expected prenatal course, office orientation and reasons to call  Follow up in 4 weeks

## 2018-02-21 NOTE — PROGRESS NOTES
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

## 2018-02-23 LAB
CHLAMYDIA DNA CVX QL NAA+PROBE: NORMAL
N GONORRHOEA DNA GENITAL QL NAA+PROBE: NORMAL

## 2018-02-26 ENCOUNTER — TELEPHONE (OUTPATIENT)
Dept: PERINATAL CARE | Facility: CLINIC | Age: 38
End: 2018-02-26

## 2018-03-22 ENCOUNTER — TRANSCRIBE ORDERS (OUTPATIENT)
Dept: LAB | Facility: HOSPITAL | Age: 38
End: 2018-03-22

## 2018-03-22 ENCOUNTER — ROUTINE PRENATAL (OUTPATIENT)
Dept: OBGYN CLINIC | Facility: CLINIC | Age: 38
End: 2018-03-22

## 2018-03-22 ENCOUNTER — APPOINTMENT (OUTPATIENT)
Dept: LAB | Facility: HOSPITAL | Age: 38
End: 2018-03-22
Attending: OBSTETRICS & GYNECOLOGY
Payer: COMMERCIAL

## 2018-03-22 VITALS — WEIGHT: 129.6 LBS | SYSTOLIC BLOOD PRESSURE: 101 MMHG | BODY MASS INDEX: 24.49 KG/M2 | DIASTOLIC BLOOD PRESSURE: 60 MMHG

## 2018-03-22 DIAGNOSIS — O09.522 ELDERLY MULTIGRAVIDA IN SECOND TRIMESTER: ICD-10-CM

## 2018-03-22 DIAGNOSIS — Z34.82 ENCOUNTER FOR SUPERVISION OF NORMAL PREGNANCY IN MULTIGRAVIDA IN SECOND TRIMESTER: Primary | ICD-10-CM

## 2018-03-22 DIAGNOSIS — O09.521 SUPERVISION OF ELDERLY MULTIGRAVIDA IN FIRST TRIMESTER: Primary | ICD-10-CM

## 2018-03-22 PROCEDURE — 82105 ALPHA-FETOPROTEIN SERUM: CPT

## 2018-03-22 PROCEDURE — PNV: Performed by: OBSTETRICS & GYNECOLOGY

## 2018-03-22 PROCEDURE — 36415 COLL VENOUS BLD VENIPUNCTURE: CPT

## 2018-03-22 NOTE — PROGRESS NOTES
Problem List Items Addressed This Visit        Other     delivery delivered     Desires repeat c/section with [facility]         Encounter for supervision of normal pregnancy in multigravida in second trimester - Primary    Elderly multigravida in second trimester        Normal NIPT! It's a boy!

## 2018-03-27 ENCOUNTER — TELEPHONE (OUTPATIENT)
Dept: OBGYN CLINIC | Facility: CLINIC | Age: 38
End: 2018-03-27

## 2018-03-27 NOTE — TELEPHONE ENCOUNTER
Pt questioning about flying      She will be 32 weeks at the time    Informed  Ok until 36 weeks as long as no problems     She has an apt with [facility] 04/06 and will discuss it with them

## 2018-03-27 NOTE — TELEPHONE ENCOUNTER
Pt called office today, left voicemail message  Pt's ANN-MARIE is 8/25/18, GA 18 wks, 3 dys  Pt saw Dr Falguni Quiroz on 3/22/18, scheduled to see Dr Frank De Leon on 4/17/18  Pt states she wants to book a trip for [place], would like to know if she can fly at 32 weeks pregnant  Pt can be reached @ [phone]

## 2018-03-28 ENCOUNTER — TELEPHONE (OUTPATIENT)
Dept: PERINATAL CARE | Facility: CLINIC | Age: 38
End: 2018-03-28

## 2018-03-28 LAB
2ND TRIMESTER 4 SCREEN SERPL-IMP: NORMAL
AFP ADJ MOM SERPL: 1.02
AFP INTERP AMN-IMP: NORMAL
AFP INTERP SERPL-IMP: NORMAL
AFP INTERP SERPL-IMP: NORMAL
AFP SERPL-MCNC: 48 NG/ML
AGE AT DELIVERY: 35.9 YEARS
GA METHOD: NORMAL
GA: 17.9 WEEKS
IDDM PATIENT QL: NO
MULTIPLE PREGNANCY: NO
NEURAL TUBE DEFECT RISK FETUS: NORMAL %

## 2018-03-29 ENCOUNTER — TELEPHONE (OUTPATIENT)
Dept: OBGYN CLINIC | Facility: CLINIC | Age: 38
End: 2018-03-29

## 2018-03-29 NOTE — TELEPHONE ENCOUNTER
Returned pts' call - pt states, "tripped over a [employer] play table & landed on knees & caught myself with my wrists/hands "  States ,"my hands do not even hurt me "  Pt denies pain, vag blding, cxtns, LOF,   States "has not felt baby move yet during this pregnancy " thinks it may be due to anterior placenta "  Pt advised to call with any of the above symptoms, or if has any further questions/concerns  -verbalized understanding  Pt has an appt with MFM next week

## 2018-03-29 NOTE — TELEPHONE ENCOUNTER
Pt called office today, left voicemail message  Pt's ANN-MARIE is 8/25/18, GA 18 wks 6 dys  Pt saw Dr Carlos Drake on 3/22/18, scheduled to see Dr Jose Luis Bland on 4/17/18  Pt states she tripped over her daughter's play table and landed on her knees and wrists  Pt states she feels "fine", wants to know if there is anything she should do? Pt can be reached @ [phone]

## 2018-04-06 ENCOUNTER — ROUTINE PRENATAL (OUTPATIENT)
Dept: PERINATAL CARE | Facility: CLINIC | Age: 38
End: 2018-04-06
Payer: COMMERCIAL

## 2018-04-06 VITALS
WEIGHT: 131 LBS | SYSTOLIC BLOOD PRESSURE: 109 MMHG | HEIGHT: 61 IN | HEART RATE: 75 BPM | DIASTOLIC BLOOD PRESSURE: 64 MMHG | BODY MASS INDEX: 24.73 KG/M2

## 2018-04-06 DIAGNOSIS — Z36.86 ENCOUNTER FOR ANTENATAL SCREENING FOR CERVICAL LENGTH: ICD-10-CM

## 2018-04-06 DIAGNOSIS — O09.522 ELDERLY MULTIGRAVIDA IN SECOND TRIMESTER: Primary | ICD-10-CM

## 2018-04-06 DIAGNOSIS — O34.41 HISTORY OF LEEP (LOOP ELECTROSURGICAL EXCISION PROCEDURE) OF CERVIX COMPLICATING PREGNANCY IN FIRST TRIMESTER: ICD-10-CM

## 2018-04-06 DIAGNOSIS — Z3A.19 19 WEEKS GESTATION OF PREGNANCY: ICD-10-CM

## 2018-04-06 DIAGNOSIS — O43.101: ICD-10-CM

## 2018-04-06 DIAGNOSIS — Z98.890 HISTORY OF LEEP (LOOP ELECTROSURGICAL EXCISION PROCEDURE) OF CERVIX COMPLICATING PREGNANCY IN FIRST TRIMESTER: ICD-10-CM

## 2018-04-06 PROBLEM — O43.102: Status: ACTIVE | Noted: 2018-04-06

## 2018-04-06 PROCEDURE — 76811 OB US DETAILED SNGL FETUS: CPT | Performed by: OBSTETRICS & GYNECOLOGY

## 2018-04-06 PROCEDURE — 99212 OFFICE O/P EST SF 10 MIN: CPT | Performed by: OBSTETRICS & GYNECOLOGY

## 2018-04-06 PROCEDURE — 76817 TRANSVAGINAL US OBSTETRIC: CPT | Performed by: OBSTETRICS & GYNECOLOGY

## 2018-04-17 ENCOUNTER — ROUTINE PRENATAL (OUTPATIENT)
Dept: OBGYN CLINIC | Facility: CLINIC | Age: 38
End: 2018-04-17

## 2018-04-17 VITALS — DIASTOLIC BLOOD PRESSURE: 68 MMHG | WEIGHT: 134 LBS | SYSTOLIC BLOOD PRESSURE: 114 MMHG | BODY MASS INDEX: 25.32 KG/M2

## 2018-04-17 DIAGNOSIS — O09.522 ELDERLY MULTIGRAVIDA IN SECOND TRIMESTER: ICD-10-CM

## 2018-04-17 DIAGNOSIS — Z3A.22 22 WEEKS GESTATION OF PREGNANCY: Primary | ICD-10-CM

## 2018-04-17 DIAGNOSIS — O34.41 HISTORY OF LEEP (LOOP ELECTROSURGICAL EXCISION PROCEDURE) OF CERVIX COMPLICATING PREGNANCY IN FIRST TRIMESTER: ICD-10-CM

## 2018-04-17 DIAGNOSIS — O43.102: ICD-10-CM

## 2018-04-17 DIAGNOSIS — Z34.82 ENCOUNTER FOR SUPERVISION OF NORMAL PREGNANCY IN MULTIGRAVIDA IN SECOND TRIMESTER: ICD-10-CM

## 2018-04-17 DIAGNOSIS — Z98.890 HISTORY OF LEEP (LOOP ELECTROSURGICAL EXCISION PROCEDURE) OF CERVIX COMPLICATING PREGNANCY IN FIRST TRIMESTER: ICD-10-CM

## 2018-04-17 DIAGNOSIS — O43.101: ICD-10-CM

## 2018-04-17 PROBLEM — B97.7 HPV IN FEMALE: Status: RESOLVED | Noted: 2018-04-17 | Resolved: 2018-04-17

## 2018-04-17 PROBLEM — N87.0 MILD CERVICAL DYSPLASIA: Status: RESOLVED | Noted: 2018-04-17 | Resolved: 2018-04-17

## 2018-04-17 PROBLEM — R87.619 ABNORMAL PAP SMEAR OF CERVIX: Status: RESOLVED | Noted: 2018-04-17 | Resolved: 2018-04-17

## 2018-04-17 PROCEDURE — PNV: Performed by: PHYSICIAN ASSISTANT

## 2018-04-17 NOTE — ASSESSMENT & PLAN NOTE
Feels well overall  Little fetal movement, felt secondary to anterior placenta  Level II normal  Has growth scan scheduled  28 week lab slip given at her request  For TOLAC with [facility]

## 2018-04-17 NOTE — PROGRESS NOTES
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

## 2018-05-15 ENCOUNTER — ROUTINE PRENATAL (OUTPATIENT)
Dept: OBGYN CLINIC | Facility: CLINIC | Age: 38
End: 2018-05-15

## 2018-05-15 VITALS — WEIGHT: 138.4 LBS | SYSTOLIC BLOOD PRESSURE: 96 MMHG | BODY MASS INDEX: 26.15 KG/M2 | DIASTOLIC BLOOD PRESSURE: 62 MMHG

## 2018-05-15 DIAGNOSIS — O09.522 ELDERLY MULTIGRAVIDA IN SECOND TRIMESTER: Primary | ICD-10-CM

## 2018-05-15 PROCEDURE — PNV: Performed by: OBSTETRICS & GYNECOLOGY

## 2018-05-15 NOTE — ASSESSMENT & PLAN NOTE
Feels well  Not feeling fetal movement regularly  Anterior placenta; had similar issue last pregnancy  Leaning toward R C/S due to previous arrest disorder

## 2018-05-15 NOTE — PROGRESS NOTES
Problem List Items Addressed This Visit        Other    Elderly multigravida in second trimester - Primary     Feels well  Not feeling fetal movement regularly  Anterior placenta; had similar issue last pregnancy  Leaning toward R C/S due to previous arrest disorder

## 2018-05-23 ENCOUNTER — TELEPHONE (OUTPATIENT)
Dept: OBGYN CLINIC | Facility: CLINIC | Age: 38
End: 2018-05-23

## 2018-05-23 NOTE — TELEPHONE ENCOUNTER
Pt called office today, left voicemail message  Pt's ANN-MARIE is 8/25/18, GA 26 wks + 4 dys  Pt saw Dr Lisa Ortega on 5/15/18, scheduled to see Dr Pozo Rash on 6/8/18  Pt has hx of anomaly of placenta 1st trimester  Pt has question, wants to speak with a nurse  Pt can be reached @  [phone]

## 2018-05-23 NOTE — TELEPHONE ENCOUNTER
Called pt , Pt states " I have a vulvar varicosity & have used a vulvar supporter in the past , am using it now except for bedtime", but wants to be checked  appt given for 5/25/18 with [facility]

## 2018-05-25 ENCOUNTER — ROUTINE PRENATAL (OUTPATIENT)
Dept: OBGYN CLINIC | Facility: CLINIC | Age: 38
End: 2018-05-25

## 2018-05-25 VITALS — WEIGHT: 140 LBS | SYSTOLIC BLOOD PRESSURE: 100 MMHG | DIASTOLIC BLOOD PRESSURE: 52 MMHG | BODY MASS INDEX: 26.45 KG/M2

## 2018-05-25 DIAGNOSIS — O43.101: ICD-10-CM

## 2018-05-25 DIAGNOSIS — O09.522 ELDERLY MULTIGRAVIDA IN SECOND TRIMESTER: ICD-10-CM

## 2018-05-25 DIAGNOSIS — O43.102: ICD-10-CM

## 2018-05-25 DIAGNOSIS — Z98.890 HISTORY OF LEEP (LOOP ELECTROSURGICAL EXCISION PROCEDURE) OF CERVIX COMPLICATING PREGNANCY IN FIRST TRIMESTER: ICD-10-CM

## 2018-05-25 DIAGNOSIS — O34.41 HISTORY OF LEEP (LOOP ELECTROSURGICAL EXCISION PROCEDURE) OF CERVIX COMPLICATING PREGNANCY IN FIRST TRIMESTER: ICD-10-CM

## 2018-05-25 DIAGNOSIS — I86.3 VARICOSE VEINS OF VULVA AND PERINEUM: Primary | ICD-10-CM

## 2018-05-25 PROCEDURE — PNV: Performed by: PHYSICIAN ASSISTANT

## 2018-05-25 NOTE — PROGRESS NOTES
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

## 2018-05-25 NOTE — PROGRESS NOTES
Patient states she has a large vulvar varicosity that is painful  She brought her V2 support with her today and wants to know how she is supposed to wear it  Will be going for her glucose test within the week

## 2018-05-25 NOTE — ASSESSMENT & PLAN NOTE
Had a vulvar varicosity last pregnancy and used a V2 supporter  This time the same vulvar varicosity presented and is now going down her groin crease and into her upper inner left thigh  Will continue V2 supporter but this does not address the leg portion; suggested rest, ice as much as possible

## 2018-06-01 ENCOUNTER — TELEPHONE (OUTPATIENT)
Dept: OBGYN CLINIC | Facility: CLINIC | Age: 38
End: 2018-06-01

## 2018-06-01 ENCOUNTER — APPOINTMENT (OUTPATIENT)
Dept: LAB | Facility: CLINIC | Age: 38
End: 2018-06-01
Payer: COMMERCIAL

## 2018-06-01 DIAGNOSIS — O99.810 ABNORMAL GLUCOSE AFFECTING PREGNANCY: Primary | ICD-10-CM

## 2018-06-01 DIAGNOSIS — Z3A.22 22 WEEKS GESTATION OF PREGNANCY: ICD-10-CM

## 2018-06-01 LAB
BASOPHILS # BLD AUTO: 0.03 THOUSANDS/ΜL (ref 0–0.1)
BASOPHILS NFR BLD AUTO: 0 % (ref 0–1)
EOSINOPHIL # BLD AUTO: 0.07 THOUSAND/ΜL (ref 0–0.61)
EOSINOPHIL NFR BLD AUTO: 1 % (ref 0–6)
ERYTHROCYTE [DISTWIDTH] IN BLOOD BY AUTOMATED COUNT: 12.8 % (ref 11.6–15.1)
GLUCOSE 1H P 50 G GLC PO SERPL-MCNC: 153 MG/DL
HCT VFR BLD AUTO: 31.5 % (ref 34.8–46.1)
HGB BLD-MCNC: 10.4 G/DL (ref 11.5–15.4)
IMM GRANULOCYTES # BLD AUTO: 0.05 THOUSAND/UL (ref 0–0.2)
IMM GRANULOCYTES NFR BLD AUTO: 1 % (ref 0–2)
LYMPHOCYTES # BLD AUTO: 1.97 THOUSANDS/ΜL (ref 0.6–4.47)
LYMPHOCYTES NFR BLD AUTO: 24 % (ref 14–44)
MCH RBC QN AUTO: 32.1 PG (ref 26.8–34.3)
MCHC RBC AUTO-ENTMCNC: 33 G/DL (ref 31.4–37.4)
MCV RBC AUTO: 97 FL (ref 82–98)
MONOCYTES # BLD AUTO: 0.4 THOUSAND/ΜL (ref 0.17–1.22)
MONOCYTES NFR BLD AUTO: 5 % (ref 4–12)
NEUTROPHILS # BLD AUTO: 5.61 THOUSANDS/ΜL (ref 1.85–7.62)
NEUTS SEG NFR BLD AUTO: 69 % (ref 43–75)
NRBC BLD AUTO-RTO: 0 /100 WBCS
PLATELET # BLD AUTO: 226 THOUSANDS/UL (ref 149–390)
PMV BLD AUTO: 10.2 FL (ref 8.9–12.7)
RBC # BLD AUTO: 3.24 MILLION/UL (ref 3.81–5.12)
WBC # BLD AUTO: 8.13 THOUSAND/UL (ref 4.31–10.16)

## 2018-06-01 PROCEDURE — 82950 GLUCOSE TEST: CPT

## 2018-06-01 PROCEDURE — 85025 COMPLETE CBC W/AUTO DIFF WBC: CPT

## 2018-06-01 PROCEDURE — 36415 COLL VENOUS BLD VENIPUNCTURE: CPT

## 2018-06-01 PROCEDURE — 86592 SYPHILIS TEST NON-TREP QUAL: CPT

## 2018-06-01 NOTE — TELEPHONE ENCOUNTER
----- Message from Jersey Aragon PA-C sent at 6/1/2018  1:19 PM EDT -----  Pt's 1hr gtt is 153; needs a 3hr gtt  Her hemoglobin is 10 4 - please add iron one po daily

## 2018-06-01 NOTE — TELEPHONE ENCOUNTER
I spoke with patient, gave instructions for iron and 3 hour GTT  Also mailed order and iron letter   She wants to use lab mandy

## 2018-06-04 LAB — RPR SER QL: NORMAL

## 2018-06-06 ENCOUNTER — TELEPHONE (OUTPATIENT)
Dept: OBGYN CLINIC | Facility: CLINIC | Age: 38
End: 2018-06-06

## 2018-06-06 NOTE — TELEPHONE ENCOUNTER
Pt called requesting that her 3 hr GTT lab slip be emailed  ( Pt is unable to go to [facility] due to insurance)  Lab slip emailed; pt to have drawn tomorrow am   Has f/u appt on Friday 6/8/18

## 2018-06-08 ENCOUNTER — ROUTINE PRENATAL (OUTPATIENT)
Dept: OBGYN CLINIC | Facility: CLINIC | Age: 38
End: 2018-06-08

## 2018-06-08 VITALS
WEIGHT: 142.6 LBS | BODY MASS INDEX: 26.24 KG/M2 | HEIGHT: 62 IN | SYSTOLIC BLOOD PRESSURE: 110 MMHG | DIASTOLIC BLOOD PRESSURE: 76 MMHG | RESPIRATION RATE: 14 BRPM

## 2018-06-08 DIAGNOSIS — Z3A.28 28 WEEKS GESTATION OF PREGNANCY: Primary | ICD-10-CM

## 2018-06-08 DIAGNOSIS — O34.41 HISTORY OF LEEP (LOOP ELECTROSURGICAL EXCISION PROCEDURE) OF CERVIX COMPLICATING PREGNANCY IN FIRST TRIMESTER: ICD-10-CM

## 2018-06-08 DIAGNOSIS — O43.101: ICD-10-CM

## 2018-06-08 DIAGNOSIS — O09.522 ELDERLY MULTIGRAVIDA IN SECOND TRIMESTER: ICD-10-CM

## 2018-06-08 DIAGNOSIS — Z98.890 HISTORY OF LEEP (LOOP ELECTROSURGICAL EXCISION PROCEDURE) OF CERVIX COMPLICATING PREGNANCY IN FIRST TRIMESTER: ICD-10-CM

## 2018-06-08 DIAGNOSIS — O43.102: ICD-10-CM

## 2018-06-08 PROCEDURE — PNV: Performed by: OBSTETRICS & GYNECOLOGY

## 2018-06-08 NOTE — PROGRESS NOTES
Patient is a 30-year-old female para 1 at 28 weeks and 6 days here for routine prenatal visit without complaint  Review of systems is positive for fetal movement negative for loss of fluid vaginal bleeding or regular contractions  3 hour GTT results are pending  We scheduled a repeat  for 2018 at 8:00 a m  with Dr Kemar Plummer

## 2018-06-13 ENCOUNTER — TELEPHONE (OUTPATIENT)
Dept: OBGYN CLINIC | Facility: CLINIC | Age: 38
End: 2018-06-13

## 2018-06-13 NOTE — TELEPHONE ENCOUNTER
Pt called asking if 3 hr GTT completed  Status is "active" pt also stating "   HNL sent them to CLYDE [facility]RE[facility] personal email " will inform pt as soon as lab wk is results are completed

## 2018-06-14 NOTE — TELEPHONE ENCOUNTER
Left voicemail message today @ [phone] per Pt's signed communication consent form in Pt's EHR  Pt informed, via voicemail message,  that her recent 3 hr GTT result was normal per TERRENCE Franklin' review (*Clear copy of health network results obtained from [employer] and interpreted by Monica Aquino)  Reiterated to Pt, via said message, that if she has any questions/concerns to contact office

## 2018-06-15 ENCOUNTER — ULTRASOUND (OUTPATIENT)
Dept: PERINATAL CARE | Facility: CLINIC | Age: 38
End: 2018-06-15
Payer: COMMERCIAL

## 2018-06-15 VITALS
BODY MASS INDEX: 25.82 KG/M2 | HEIGHT: 62 IN | WEIGHT: 140.3 LBS | DIASTOLIC BLOOD PRESSURE: 75 MMHG | SYSTOLIC BLOOD PRESSURE: 113 MMHG | HEART RATE: 74 BPM

## 2018-06-15 DIAGNOSIS — O09.523 ELDERLY MULTIGRAVIDA IN THIRD TRIMESTER: Primary | ICD-10-CM

## 2018-06-15 DIAGNOSIS — O36.8130 DECREASED FETAL MOVEMENT AFFECTING MANAGEMENT OF PREGNANCY IN THIRD TRIMESTER, SINGLE OR UNSPECIFIED FETUS: ICD-10-CM

## 2018-06-15 DIAGNOSIS — O43.103 ANOMALY OF PLACENTA IN THIRD TRIMESTER: ICD-10-CM

## 2018-06-15 DIAGNOSIS — Z3A.29 29 WEEKS GESTATION OF PREGNANCY: ICD-10-CM

## 2018-06-15 PROCEDURE — 76816 OB US FOLLOW-UP PER FETUS: CPT | Performed by: OBSTETRICS & GYNECOLOGY

## 2018-06-15 PROCEDURE — 59025 FETAL NON-STRESS TEST: CPT | Performed by: OBSTETRICS & GYNECOLOGY

## 2018-06-15 PROCEDURE — 99212 OFFICE O/P EST SF 10 MIN: CPT | Performed by: OBSTETRICS & GYNECOLOGY

## 2018-06-15 NOTE — PROGRESS NOTES
Please refer to the [facility] ultrasound report in Ob Procedures for additional information regarding the visit to the [facility]  today

## 2018-06-15 NOTE — LETTER
[place] 15, 2018     Josey Fernandes MD  [address] [place] [id number]    Patient: Rebeka Wiggins   YOB: 1980   Date of Visit: 6/15/2018       Dear Dr Emma King: Thank you for referring Jaylene Jay to me for evaluation  Below are my notes for this consultation  If you have questions, please do not hesitate to call me  I look forward to following your patient along with you  Sincerely,        Sandra Velez MD        CC: No Recipients  Sandra Velez MD  6/15/2018 10:13 AM  Sign at close encounter  Please refer to the [facility] ultrasound report in Ob Procedures for additional information regarding the visit to the [facility]  today

## 2018-06-18 ENCOUNTER — ROUTINE PRENATAL (OUTPATIENT)
Dept: OBGYN CLINIC | Facility: CLINIC | Age: 38
End: 2018-06-18

## 2018-06-18 VITALS — WEIGHT: 144.8 LBS | DIASTOLIC BLOOD PRESSURE: 62 MMHG | SYSTOLIC BLOOD PRESSURE: 122 MMHG | BODY MASS INDEX: 26.48 KG/M2

## 2018-06-18 DIAGNOSIS — Z34.83 PRENATAL CARE, SUBSEQUENT PREGNANCY, THIRD TRIMESTER: Primary | ICD-10-CM

## 2018-06-18 DIAGNOSIS — Z98.891 PREVIOUS CESAREAN SECTION: ICD-10-CM

## 2018-06-18 PROBLEM — Z34.82 ENCOUNTER FOR SUPERVISION OF NORMAL PREGNANCY IN MULTIGRAVIDA IN SECOND TRIMESTER: Status: RESOLVED | Noted: 2018-02-21 | Resolved: 2018-06-18

## 2018-06-18 PROBLEM — O09.522 ELDERLY MULTIGRAVIDA IN SECOND TRIMESTER: Status: RESOLVED | Noted: 2018-03-22 | Resolved: 2018-06-18

## 2018-06-18 PROCEDURE — PNV: Performed by: OBSTETRICS & GYNECOLOGY

## 2018-06-18 NOTE — PROGRESS NOTES
Problem List Items Addressed This Visit        Genitourinary    History of LEEP (loop electrosurgical excision procedure) of cervix complicating pregnancy in first trimester       Other    Anomaly of placenta in first trimester    Anomaly of placenta in second trimester    Prenatal care, subsequent pregnancy, third trimester     Has no new problems, passed her 3 hr GTT, nl FM, had nl US last week with [facility], no more US planned for now         Previous  section - Primary     Set up for repeat C Section on 2018 at 0800 with Dr Hattie Lyman  Discussed option for TOLAC         RESOLVED: Elderly multigravida in second trimester

## 2018-06-18 NOTE — ASSESSMENT & PLAN NOTE
Set up for repeat C Section on 08/21/2018 at 0800 with Dr Andi Moritz  Discussed option for [facility]

## 2018-06-18 NOTE — ASSESSMENT & PLAN NOTE
Has no new problems, passed her 3 hr GTT, nl FM, had nl US last week with [facility], no more US planned for now

## 2018-07-03 ENCOUNTER — ROUTINE PRENATAL (OUTPATIENT)
Dept: OBGYN CLINIC | Facility: CLINIC | Age: 38
End: 2018-07-03
Payer: COMMERCIAL

## 2018-07-03 VITALS — WEIGHT: 142 LBS | BODY MASS INDEX: 25.97 KG/M2 | SYSTOLIC BLOOD PRESSURE: 100 MMHG | DIASTOLIC BLOOD PRESSURE: 60 MMHG

## 2018-07-03 DIAGNOSIS — Z98.890 HISTORY OF LEEP (LOOP ELECTROSURGICAL EXCISION PROCEDURE) OF CERVIX COMPLICATING PREGNANCY IN FIRST TRIMESTER: ICD-10-CM

## 2018-07-03 DIAGNOSIS — O34.41 HISTORY OF LEEP (LOOP ELECTROSURGICAL EXCISION PROCEDURE) OF CERVIX COMPLICATING PREGNANCY IN FIRST TRIMESTER: ICD-10-CM

## 2018-07-03 DIAGNOSIS — Z23 NEED FOR TDAP VACCINATION: ICD-10-CM

## 2018-07-03 DIAGNOSIS — Z34.83 PRENATAL CARE, SUBSEQUENT PREGNANCY, THIRD TRIMESTER: Primary | ICD-10-CM

## 2018-07-03 PROBLEM — O43.101: Status: RESOLVED | Noted: 2018-02-09 | Resolved: 2018-07-03

## 2018-07-03 PROCEDURE — PNV: Performed by: OBSTETRICS & GYNECOLOGY

## 2018-07-03 PROCEDURE — 90471 IMMUNIZATION ADMIN: CPT

## 2018-07-03 PROCEDURE — 90715 TDAP VACCINE 7 YRS/> IM: CPT

## 2018-07-20 ENCOUNTER — ROUTINE PRENATAL (OUTPATIENT)
Dept: OBGYN CLINIC | Facility: CLINIC | Age: 38
End: 2018-07-20

## 2018-07-20 VITALS — SYSTOLIC BLOOD PRESSURE: 112 MMHG | BODY MASS INDEX: 27.03 KG/M2 | WEIGHT: 147.8 LBS | DIASTOLIC BLOOD PRESSURE: 54 MMHG

## 2018-07-20 DIAGNOSIS — Z34.83 ENCOUNTER FOR SUPERVISION OF OTHER NORMAL PREGNANCY IN THIRD TRIMESTER: ICD-10-CM

## 2018-07-20 DIAGNOSIS — Z98.891 PREVIOUS CESAREAN SECTION: Primary | ICD-10-CM

## 2018-07-20 PROBLEM — Z34.93 ENCOUNTER FOR SUPERVISION OF NORMAL PREGNANCY IN THIRD TRIMESTER: Status: ACTIVE | Noted: 2018-06-18

## 2018-07-20 PROCEDURE — PNV: Performed by: NURSE PRACTITIONER

## 2018-07-20 NOTE — PROGRESS NOTES
Problem List Items Addressed This Visit     Encounter for supervision of normal pregnancy in third trimester     Denies OB complaints  Good fetal movement  Denies contractions, cramping, leakage of fluid or vaginal bleeding  S/p tdap vaccine  Baby and Me considerations reinforced  Reviewed  labor precautions and FKCs  Previous  section - Primary     Planning repeat C/S

## 2018-08-07 ENCOUNTER — ROUTINE PRENATAL (OUTPATIENT)
Dept: OBGYN CLINIC | Facility: CLINIC | Age: 38
End: 2018-08-07

## 2018-08-07 VITALS — SYSTOLIC BLOOD PRESSURE: 112 MMHG | BODY MASS INDEX: 27.8 KG/M2 | WEIGHT: 152 LBS | DIASTOLIC BLOOD PRESSURE: 62 MMHG

## 2018-08-07 DIAGNOSIS — Z34.83 ENCOUNTER FOR SUPERVISION OF OTHER NORMAL PREGNANCY IN THIRD TRIMESTER: Primary | ICD-10-CM

## 2018-08-07 DIAGNOSIS — Z3A.37 37 WEEKS GESTATION OF PREGNANCY: ICD-10-CM

## 2018-08-07 DIAGNOSIS — O43.103 ANOMALY OF PLACENTA IN THIRD TRIMESTER: ICD-10-CM

## 2018-08-07 PROCEDURE — PNV: Performed by: OBSTETRICS & GYNECOLOGY

## 2018-08-07 PROCEDURE — 87653 STREP B DNA AMP PROBE: CPT | Performed by: OBSTETRICS & GYNECOLOGY

## 2018-08-07 RX ORDER — RANITIDINE HCL 75 MG
75 TABLET ORAL 2 TIMES DAILY
COMMUNITY
End: 2018-09-14

## 2018-08-07 NOTE — PROGRESS NOTES
Swelling in her feet at work  Relieved with rest  She is very uncomfortable  C/S scheduled for 8/21/2018 with Dr Jerome Sancheziclens and shower instructions given  Has daily heartburn  Encouraged Zantac BID  GBS done

## 2018-08-10 LAB — GP B STREP DNA SPEC QL NAA+PROBE: NORMAL

## 2018-08-13 ENCOUNTER — ROUTINE PRENATAL (OUTPATIENT)
Dept: OBGYN CLINIC | Facility: CLINIC | Age: 38
End: 2018-08-13

## 2018-08-13 VITALS — BODY MASS INDEX: 27.8 KG/M2 | WEIGHT: 152 LBS | SYSTOLIC BLOOD PRESSURE: 118 MMHG | DIASTOLIC BLOOD PRESSURE: 60 MMHG

## 2018-08-13 DIAGNOSIS — Z98.891 PREVIOUS CESAREAN SECTION: ICD-10-CM

## 2018-08-13 DIAGNOSIS — Z34.83 ENCOUNTER FOR SUPERVISION OF OTHER NORMAL PREGNANCY IN THIRD TRIMESTER: Primary | ICD-10-CM

## 2018-08-13 PROCEDURE — PNV: Performed by: NURSE PRACTITIONER

## 2018-08-13 NOTE — PROGRESS NOTES
Problem List Items Addressed This Visit     Encounter for supervision of normal pregnancy in third trimester - Primary     Denies OB complaints  Good fetal movement  Denies contractions, cramping, leakage of fluid or vaginal bleeding  GBS neg  Baby and Me considerations reinforced  Reviewed labor precautions and FKCs  Previous  section     Repeat C/S is scheduled with Priscilla Santillan on 18  Reviewed Hibiclens wash and preop instructions

## 2018-08-13 NOTE — ASSESSMENT & PLAN NOTE
Repeat C/S is scheduled with Forest Holguin on 8/21/18  Reviewed Hibiclens wash and preop instructions

## 2018-08-14 ENCOUNTER — TELEPHONE (OUTPATIENT)
Dept: OBGYN CLINIC | Facility: CLINIC | Age: 38
End: 2018-08-14

## 2018-08-14 NOTE — TELEPHONE ENCOUNTER
Returned pts' p c - pt states, "I fell going into the garage  "-Landed on my knees & wrist " states she is ok,just some bruising "  Has +FM, denies vag blding, LOF or cxtns  Suggested pt take it easy today, elevate legs, increase water intake, & to wear sneakers instead of flip flops  Pt has her c/s scheduled for next Tuesday 8/21/18  Advised to call with any further questions/concerns

## 2018-08-20 ENCOUNTER — ANESTHESIA EVENT (INPATIENT)
Dept: LABOR AND DELIVERY | Facility: HOSPITAL | Age: 38
End: 2018-08-20
Payer: COMMERCIAL

## 2018-08-20 NOTE — ANESTHESIA PREPROCEDURE EVALUATION
Review of Systems/Medical History  Patient summary reviewed  Chart reviewed  No history of anesthetic complications     Cardiovascular  Negative cardio ROS    Pulmonary  Negative pulmonary ROS        GI/Hepatic  Negative GI/hepatic ROS          Negative  ROS        Endo/Other  Negative endo/other ROS      GYN  Currently pregnant , Prior pregnancy/OB history : 2 Parity: 1,     Comment: Uterine fibroids     Hematology  Negative hematology ROS      Musculoskeletal  Negative musculoskeletal ROS        Neurology  Negative neurology ROS      Psychology   Negative psychology ROS              Physical Exam    Airway    Mallampati score: II  TM Distance: >3 FB  Neck ROM: full     Dental   No notable dental hx     Cardiovascular  Comment: Negative ROS, Rhythm: regular, Rate: normal, Cardiovascular exam normal    Pulmonary  Pulmonary exam normal Breath sounds clear to auscultation,     Other Findings        Anesthesia Plan  ASA Score- 2     Anesthesia Type- spinal with ASA Monitors  Additional Monitors:   Airway Plan:         Plan Factors-    Induction-     Postoperative Plan- Plan for postoperative opioid use  Informed Consent- Anesthetic plan and risks discussed with patient  I personally reviewed this patient with the CRNA  Discussed and agreed on the Anesthesia Plan with the CRNA  Marino Santiago Recent labs personally reviewed:  Lab Results   Component Value Date    WBC 8 13 2018    HGB 10 4 (L) 2018     2018     No results found for: NA, K, BUN, CREATININE, GLUCOSE  Lab Results   Component Value Date    PTT 29 2016    PTT 29 2016      Lab Results   Component Value Date    INR 1 07 2016       Blood type O    No results found for: Katie Cook, Edda Rainey MD, have personally seen and evaluated the patient prior to anesthetic care  I have reviewed the pre-anesthetic record, and other medical records if appropriate to the anesthetic care    If a CRNA is involved in the case, I have reviewed the CRNA assessment, if present, and agree  Risks/benefits and alternatives discussed with patient including possible PONV, sore throat, and possibility of rare anesthetic and surgical emergencies

## 2018-08-20 NOTE — H&P
H&P Exam - Obstetrics   Taylor Cr 30 y o  female MRN: [id number]  Unit/Bed#: LD PACU-03 Encounter: [id number]    >2 Midnights    INPATIENT     Pt is under the care of [address] [facility]    History of Present Illness   Chief Complaint: Elective     HPI:  Taylor Cr is a 30 y o   female with an ANN-MARIE of 2018, by Last Menstrual Period at 39w2d weeks gestation who is being admitted for scheduled, repeat  section  Contractions: None  Leakage of fluid: None  Bleeding: None  Fetal movement: present  Her current obstetrical history is significant for prior , AMA, Hx of LEEP, abnormal 1 hr glucola with normal 3hr GTT     Review of Systems   Respiratory: Negative for shortness of breath  Cardiovascular: Negative for chest pain  Gastrointestinal: Negative for abdominal pain, diarrhea, nausea and vomiting  Heartburn     Genitourinary: Negative for vaginal bleeding         Historical Information   OB History    Para Term  AB Living   2 1 1     1   SAB TAB Ectopic Multiple Live Births         0 1      # Outcome Date GA Lbr Wesly/2nd Weight Sex Delivery Anes PTL Lv   2 Current            1 Term 16 39w3d / 03:52 2863 g (6 lb 5 oz) F CS-LTranv EPI N GRADY      Birth Comments: head,chest,abdomen measurements per baby sbar  at admission         Baby complications/comments: None  Past Medical History:   Diagnosis Date    Abnormal Pap smear of cervix     with atypical squamous cells of undetermined sing (ASC-US)     delivery delivered 2016    Genital warts     HPV in female     Mild cervical dysplasia     Moderate dysplasia of cervix (ALISHA II)     Seasonal allergies     Subserosal leiomyoma of uterus     resolved     Varicella     had as child    Varicose veins of vulva and perineum 2018    Visual impairment     eyewear     Past Surgical History:   Procedure Laterality Date    CERVICAL BIOPSY  W/ LOOP ELECTRODE EXCISION   SECTION      COLPOSCOPY      MYOMECTOMY      resolved 2016    CA  DELIVERY ONLY N/A 2016 daughter    José Antonio Ramirez      WISDOM TOOTH EXTRACTION       Social History   History   Alcohol Use No     History   Drug Use No     History   Smoking Status    Never Smoker   Smokeless Tobacco    Never Used     Family History: non-contributory    Meds/Allergies   {  Prescriptions Prior to Admission   Medication    Calcium Carbonate Antacid (TUMS PO)    Ferrous Sulfate (SLOW FE PO)    Folic Acid 20 MG CAPS    Prenatal MV-Min-Fe Fum-FA-DHA (PRENATAL+DHA) 28-0 975 & 200 MG MISC    ranitidine (ZANTAC) 75 MG tablet     Allergies   Allergen Reactions    Pollen Extract Allergic Rhinitis       Objective   Vitals: Blood pressure 114/68, pulse 74, temperature 97 5 °F (36 4 °C), temperature source Oral, resp  rate 16, height 5' 2" (1 575 m), weight 68 9 kg (152 lb), last menstrual period 2017, currently breastfeeding  Body mass index is 27 8 kg/m²  Invasive Devices     Peripheral Intravenous Line            Peripheral IV 18 Left Forearm less than 1 day                Physical Exam   Constitutional: She is oriented to person, place, and time  She appears well-developed and well-nourished  No distress  HENT:   Head: Normocephalic and atraumatic  Cardiovascular: Normal rate, regular rhythm and normal heart sounds  Exam reveals no gallop and no friction rub  No murmur heard  Pulmonary/Chest: Effort normal and breath sounds normal  No respiratory distress  She has no wheezes  She has no rales  Abdominal: Soft  Bowel sounds are normal    Gravid uterus   Neurological: She is alert and oriented to person, place, and time  Skin: She is not diaphoretic  Psychiatric: She has a normal mood and affect  Her behavior is normal    Vitals reviewed         Membranes: Intact  FHR: Baseline: 130 bpm, Variability: Moderate 6 - 25 bpm, Accelerations: Reactive, Decelerations: Absent and Category 1    Bonneau: None    Prenatal Labs: I have personally reviewed pertinent reports  , Blood Type:   Lab Results   Component Value Date/Time    ABO Grouping O 2018    ABO Grouping O 2016 05:14 AM     , D (Rh type):   Lab Results   Component Value Date/Time    Rh Factor Positive 2016 05:14 AM     , Antibody Screen:   Lab Results   Component Value Date/Time    Antibody Screen Negative 2016 05:14 AM    , 1 hour Glucola:   Lab Results   Component Value Date/Time    GLUCOSE 1 HR 50 GM GLUC CHALLENGE-PREG  2016 12:00 AM    Glucose 153 (H) 2018 07:23 AM   , 3 hour GTT:   Lab Results   Component Value Date/Time    Glucose, GTT - 3 Hour 120 2018   , Varicella: positive history    , Rubella: non-reactive     , VDRL/RPR:   Lab Results   Component Value Date/Time    RPR Non-Reactive 2018 07:23 AM      , Hep B:   Lab Results   Component Value Date/Time    HEPATITIS B SURFACE ANTIGEN See Attached Report 2016 12:00 AM    External Hepatitis B Surface Ag negative 2018     , HIV: negative  , Chlamydia:   Lab Results   Component Value Date/Time    External Chlamydia Screen negative 2016     , Gonorrhea:   Lab Results   Component Value Date/Time    N gonorrhoeae, DNA Probe N  gonorrhoeae Amplified DNA Negative 2018 05:44 PM    External Gonorrhea Screen negative 2016     , Group B Strep:    Lab Results   Component Value Date/Time    STREP GRP B, MOLECULAR NEGATIVE 10/13/2016 10:05 AM    Strep Grp B PCR Negative for Beta Hemolytic Strep Grp B by PCR 2018 03:42 PM          Imaging, EKG, Pathology, and Other Studies: I have personally reviewed pertinent reports        Assessment/Plan     Assessment:  IUP at 39w2d for scheduled, repeat  section  Plan:  1) Admit to L&D  2) Routine Labs: CBC, T&S, RPR  3) F/E/N: NPO, IV LR at 125mL/hr  4) NICU and Anesthesia Consult  5) Place SCDs and Mitchell catheter  6) 1g Ancef pre-operatively    D/w Dr Allen Hooper MD  OBGYN, PGY-2  8/21/2018 7:19 AM

## 2018-08-20 NOTE — DISCHARGE INSTRUCTIONS
Section   WHAT YOU SHOULD KNOW:   A  delivery, or , is abdominal surgery to deliver your baby  There are many reasons you may need a   · A  may be scheduled before labor if you had a  with your last baby  It may be scheduled if your baby is not positioned normally, or you are pregnant with more than 1 baby  · Your caregiver may perform an emergency  during labor to prevent life-threatening complications for you or your baby  A  may be done if your cervix does not dilate after several hours of active labor  · Other reasons for a  include maternal infections and problems with the placenta  AFTER YOU LEAVE:   Medicines:   · Prescription pain medicine  may be given  Ask how to take this medicine safely  · Acetaminophen  decreases pain and fever  It is available without a doctor's order  Ask how much to take and how often to take it  Follow directions  Acetaminophen can cause liver damage if not taken correctly  · NSAIDs  help decrease swelling and pain or fever  This medicine is available with or without a doctor's order  NSAIDs can cause stomach bleeding or kidney problems in certain people  If you take blood thinner medicine, always ask your obstetrician if NSAIDs are safe for you  Always read the medicine label and follow directions  · Take your medicine as directed  Contact your obstetrician (OB) if you think your medicine is not helping or if you have side effects  Tell him if you are allergic to any medicine  Keep a list of the medicines, vitamins, and herbs you take  Include the amounts, and when and why you take them  Bring the list or the pill bottles to follow-up visits  Carry your medicine list with you in case of an emergency  Follow up with your OB as directed: You may need to return to have your stitches or staples removed   Write down your questions so you remember to ask them during your visits  Wound care:  Carefully wash your wound with soap and water every day  Keep your wound clean and dry  Wear loose, comfortable clothes that do not rub against your wound  Ask your OB about bathing and showering  Drink plenty of liquids: You can lower your risk for a blood clot if you drink plenty of liquids  Ask how much liquid to drink each day and which liquids are best for you  Limit activity until you have fully recovered from surgery:   · Ask when it is safe for you to drive, walk up stairs, lift heavy objects, and have sex  · Ask when it is okay to exercise, and what types of exercise to do  Start slowly and do more as you get stronger  Contact your OB if:   · You have heavy vaginal bleeding that fills 1 or more sanitary pads in 1 hour  · You have a fever  · Your incision is swollen, red, or draining pus  · You have questions or concerns about yourself or your baby  Seek care immediately or call 911 if:   · Blood soaks through your bandage  · Your stitches come apart  · You feel lightheaded, short of breath, and have chest pain  · You cough up blood  · Your arm or leg feels warm, tender, and painful  It may look swollen and red  © 2014 [address] is for End User's use only and may not be sold, redistributed or otherwise used for commercial purposes  All illustrations and images included in CareNotes® are the copyrighted property of A D A M , Inc  or Star Ramirez  The above information is an  only  It is not intended as medical advice for individual conditions or treatments  Talk to your doctor, nurse or pharmacist before following any medical regimen to see if it is safe and effective for you

## 2018-08-20 NOTE — DISCHARGE SUMMARY
Discharge Summary - Fuentes Hughes 30 y o  female MRN: [id number]    Unit/Bed#:  Encounter: [id number]    Admission Date: 18     Discharge Date: 18    Admitting Diagnosis:   1  Pregnancy at 39w2d  2  Hx of prior C/S  3  AMA  4  Hx of LEEP  5  Abnormal 1 hr glucola    Discharge Diagnosis: same, delivered  Breech presentation    Procedures: repeat  section, low transverse incision    Hospital Course: Fuentes Hughes is a 30 y o  [place] at 39w2d wks who was initially admitted for scheduled, repeat  section  She delivered a viable male  on 18 at [phone]  Weight 7lbs 1oz via repeat  section, low transverse incision  Apgars were 7 (1 min) and 9 (5 min)  Patient tolerated the procedure well and was transferred to recovery in stable condition  Her post-operative course was uncomplicated   Preoperative hemoglobin was 11 5, postoperative was 7 9  Her postoperative pain was well controlled with oral analgesics  On day of discharge, she was ambulating and able to reasonably perform all ADLs  She was voiding and had appropriate bowel function  Pain was well controlled  She was discharged home on post-operative day #2 without complications  Patient was instructed to follow up with her OB as an outpatient and was given appropriate warnings to call provider if she develops signs of infection or uncontrolled pain  Complications: none apparent    Condition at discharge: good      Discharge Medications:   Prenatal vitamin daily for 6 months or the duration of nursing whichever is longer  Motrin 600 mg orally every 6 hours as needed for pain  Tylenol (over the counter) per bottle directions as needed for pain  Hydrocortisone cream 1% (over the counter) applied 1-2x daily to hemorrhoids as needed  Witch hazel pads for hemorrhoidal discomfort as needed  Percocet for moderate pain every 8 hrs for 3 days    Discharge instructions: See after visit summary for complete information    Do not place anything (no partner, tampons or douche) in your vagina for 6 weeks  You may walk for exercise for the first 6 weeks then gradually return to your usual activities     Please do not drive for 1 week if you have no stitches and for 2 weeks if you have stitches or underwent a  delivery     You may take baths or shower per your preference     Please look at your bust (breasts) in the mirror daily and call for redness or tenderness or increased warmth     If you have had a  please look at your incision daily as well and call us for increasing redness or steady drainage from the incision     Please call us for temperature > 100 4*F or 38* C, worsening pain or a foul discharge      Disposition: Home    Planned Readmission: No

## 2018-08-21 ENCOUNTER — HOSPITAL ENCOUNTER (INPATIENT)
Facility: HOSPITAL | Age: 38
LOS: 2 days | Discharge: HOME | End: 2018-08-23
Attending: OBSTETRICS & GYNECOLOGY | Admitting: OBSTETRICS & GYNECOLOGY
Payer: COMMERCIAL

## 2018-08-21 ENCOUNTER — ANESTHESIA (INPATIENT)
Dept: LABOR AND DELIVERY | Facility: HOSPITAL | Age: 38
End: 2018-08-21
Payer: COMMERCIAL

## 2018-08-21 DIAGNOSIS — O34.219 PREVIOUS CESAREAN SECTION COMPLICATING PREGNANCY: ICD-10-CM

## 2018-08-21 DIAGNOSIS — Z3A.39 39 WEEKS GESTATION OF PREGNANCY: Primary | ICD-10-CM

## 2018-08-21 DIAGNOSIS — Z98.891 STATUS POST REPEAT LOW TRANSVERSE CESAREAN SECTION: ICD-10-CM

## 2018-08-21 LAB
ABO GROUP BLD: NORMAL
BASE EXCESS BLDCOA CALC-SCNC: -4.5 MMOL/L (ref 3–11)
BASE EXCESS BLDCOV CALC-SCNC: -3.6 MMOL/L (ref 1–9)
BASOPHILS # BLD AUTO: 0.02 THOUSANDS/ΜL (ref 0–0.1)
BASOPHILS NFR BLD AUTO: 0 % (ref 0–1)
BLD GP AB SCN SERPL QL: NEGATIVE
EOSINOPHIL # BLD AUTO: 0.05 THOUSAND/ΜL (ref 0–0.61)
EOSINOPHIL NFR BLD AUTO: 1 % (ref 0–6)
ERYTHROCYTE [DISTWIDTH] IN BLOOD BY AUTOMATED COUNT: 13.2 % (ref 11.6–15.1)
HCO3 BLDCOA-SCNC: 21.4 MMOL/L (ref 17.3–27.3)
HCO3 BLDCOV-SCNC: 21.8 MMOL/L (ref 12.2–28.6)
HCT VFR BLD AUTO: 35.1 % (ref 34.8–46.1)
HGB BLD-MCNC: 11.5 G/DL (ref 11.5–15.4)
IMM GRANULOCYTES # BLD AUTO: 0.03 THOUSAND/UL (ref 0–0.2)
IMM GRANULOCYTES NFR BLD AUTO: 0 % (ref 0–2)
LYMPHOCYTES # BLD AUTO: 2.35 THOUSANDS/ΜL (ref 0.6–4.47)
LYMPHOCYTES NFR BLD AUTO: 31 % (ref 14–44)
MCH RBC QN AUTO: 31.3 PG (ref 26.8–34.3)
MCHC RBC AUTO-ENTMCNC: 32.8 G/DL (ref 31.4–37.4)
MCV RBC AUTO: 95 FL (ref 82–98)
MONOCYTES # BLD AUTO: 0.74 THOUSAND/ΜL (ref 0.17–1.22)
MONOCYTES NFR BLD AUTO: 10 % (ref 4–12)
NEUTROPHILS # BLD AUTO: 4.37 THOUSANDS/ΜL (ref 1.85–7.62)
NEUTS SEG NFR BLD AUTO: 58 % (ref 43–75)
NRBC BLD AUTO-RTO: 0 /100 WBCS
O2 CT VFR BLDCOA CALC: 10.1 ML/DL
OXYHGB MFR BLDCOA: 45.2 %
OXYHGB MFR BLDCOV: 39.4 %
PCO2 BLDCOA: 42.5 MM[HG] (ref 30–60)
PCO2 BLDCOV: 40.5 MM HG (ref 27–43)
PH BLDCOA: 7.32 [PH] (ref 7.23–7.43)
PH BLDCOV: 7.35 [PH] (ref 7.19–7.49)
PLATELET # BLD AUTO: 177 THOUSANDS/UL (ref 149–390)
PMV BLD AUTO: 10.3 FL (ref 8.9–12.7)
PO2 BLDCOA: 22.8 MM HG (ref 5–25)
PO2 BLDCOV: 19.3 MM HG (ref 15–45)
RBC # BLD AUTO: 3.68 MILLION/UL (ref 3.81–5.12)
RH BLD: POSITIVE
RPR SER QL: NORMAL
SAO2 % BLDCOV: 8.9 ML/DL
SPECIMEN EXPIRATION DATE: NORMAL
WBC # BLD AUTO: 7.56 THOUSAND/UL (ref 4.31–10.16)

## 2018-08-21 PROCEDURE — 86592 SYPHILIS TEST NON-TREP QUAL: CPT | Performed by: OBSTETRICS & GYNECOLOGY

## 2018-08-21 PROCEDURE — 59510 CESAREAN DELIVERY: CPT | Performed by: OBSTETRICS & GYNECOLOGY

## 2018-08-21 PROCEDURE — 86850 RBC ANTIBODY SCREEN: CPT | Performed by: OBSTETRICS & GYNECOLOGY

## 2018-08-21 PROCEDURE — 85025 COMPLETE CBC W/AUTO DIFF WBC: CPT | Performed by: OBSTETRICS & GYNECOLOGY

## 2018-08-21 PROCEDURE — 86900 BLOOD TYPING SEROLOGIC ABO: CPT | Performed by: OBSTETRICS & GYNECOLOGY

## 2018-08-21 PROCEDURE — 86901 BLOOD TYPING SEROLOGIC RH(D): CPT | Performed by: OBSTETRICS & GYNECOLOGY

## 2018-08-21 PROCEDURE — 82805 BLOOD GASES W/O2 SATURATION: CPT | Performed by: OBSTETRICS & GYNECOLOGY

## 2018-08-21 PROCEDURE — 4A1HXCZ MONITORING OF PRODUCTS OF CONCEPTION, CARDIAC RATE, EXTERNAL APPROACH: ICD-10-PCS | Performed by: OBSTETRICS & GYNECOLOGY

## 2018-08-21 RX ORDER — DOCUSATE SODIUM 100 MG/1
100 CAPSULE, LIQUID FILLED ORAL 2 TIMES DAILY
Status: DISCONTINUED | OUTPATIENT
Start: 2018-08-21 | End: 2018-08-23 | Stop reason: HOSPADM

## 2018-08-21 RX ORDER — DEXAMETHASONE SODIUM PHOSPHATE 4 MG/ML
8 INJECTION, SOLUTION INTRA-ARTICULAR; INTRALESIONAL; INTRAMUSCULAR; INTRAVENOUS; SOFT TISSUE ONCE AS NEEDED
Status: ACTIVE | OUTPATIENT
Start: 2018-08-21 | End: 2018-08-22

## 2018-08-21 RX ORDER — TRISODIUM CITRATE DIHYDRATE AND CITRIC ACID MONOHYDRATE 500; 334 MG/5ML; MG/5ML
30 SOLUTION ORAL ONCE
Status: COMPLETED | OUTPATIENT
Start: 2018-08-21 | End: 2018-08-21

## 2018-08-21 RX ORDER — ACETAMINOPHEN 325 MG/1
650 TABLET ORAL EVERY 6 HOURS PRN
Status: DISCONTINUED | OUTPATIENT
Start: 2018-08-21 | End: 2018-08-23 | Stop reason: HOSPADM

## 2018-08-21 RX ORDER — SIMETHICONE 80 MG
80 TABLET,CHEWABLE ORAL EVERY 6 HOURS PRN
Status: DISCONTINUED | OUTPATIENT
Start: 2018-08-21 | End: 2018-08-23 | Stop reason: HOSPADM

## 2018-08-21 RX ORDER — DIPHENHYDRAMINE HCL 25 MG
25 TABLET ORAL EVERY 6 HOURS PRN
Status: DISCONTINUED | OUTPATIENT
Start: 2018-08-22 | End: 2018-08-23 | Stop reason: HOSPADM

## 2018-08-21 RX ORDER — DOCUSATE SODIUM 100 MG/1
CAPSULE, LIQUID FILLED ORAL
Status: COMPLETED
Start: 2018-08-21 | End: 2018-08-21

## 2018-08-21 RX ORDER — CALCIUM CARBONATE 200(500)MG
500 TABLET,CHEWABLE ORAL DAILY PRN
Status: DISCONTINUED | OUTPATIENT
Start: 2018-08-21 | End: 2018-08-21

## 2018-08-21 RX ORDER — SODIUM CHLORIDE, SODIUM LACTATE, POTASSIUM CHLORIDE, CALCIUM CHLORIDE 600; 310; 30; 20 MG/100ML; MG/100ML; MG/100ML; MG/100ML
125 INJECTION, SOLUTION INTRAVENOUS CONTINUOUS
Status: DISCONTINUED | OUTPATIENT
Start: 2018-08-21 | End: 2018-08-23 | Stop reason: HOSPADM

## 2018-08-21 RX ORDER — IBUPROFEN 600 MG/1
600 TABLET ORAL EVERY 6 HOURS PRN
Status: DISCONTINUED | OUTPATIENT
Start: 2018-08-21 | End: 2018-08-23 | Stop reason: HOSPADM

## 2018-08-21 RX ORDER — FENTANYL CITRATE 50 UG/ML
INJECTION, SOLUTION INTRAMUSCULAR; INTRAVENOUS AS NEEDED
Status: DISCONTINUED | OUTPATIENT
Start: 2018-08-21 | End: 2018-08-21

## 2018-08-21 RX ORDER — KETOROLAC TROMETHAMINE 30 MG/ML
30 INJECTION, SOLUTION INTRAMUSCULAR; INTRAVENOUS EVERY 6 HOURS
Status: DISPENSED | OUTPATIENT
Start: 2018-08-21 | End: 2018-08-22

## 2018-08-21 RX ORDER — SODIUM CHLORIDE, SODIUM LACTATE, POTASSIUM CHLORIDE, CALCIUM CHLORIDE 600; 310; 30; 20 MG/100ML; MG/100ML; MG/100ML; MG/100ML
125 INJECTION, SOLUTION INTRAVENOUS CONTINUOUS
Status: DISCONTINUED | OUTPATIENT
Start: 2018-08-21 | End: 2018-08-21 | Stop reason: SDUPTHER

## 2018-08-21 RX ORDER — METOCLOPRAMIDE HYDROCHLORIDE 5 MG/ML
5 INJECTION INTRAMUSCULAR; INTRAVENOUS EVERY 6 HOURS PRN
Status: ACTIVE | OUTPATIENT
Start: 2018-08-21 | End: 2018-08-22

## 2018-08-21 RX ORDER — OXYTOCIN/RINGER'S LACTATE 30/500 ML
PLASTIC BAG, INJECTION (ML) INTRAVENOUS CONTINUOUS PRN
Status: DISCONTINUED | OUTPATIENT
Start: 2018-08-21 | End: 2018-08-21 | Stop reason: SURG

## 2018-08-21 RX ORDER — DIAPER,BRIEF,INFANT-TODD,DISP
1 EACH MISCELLANEOUS 4 TIMES DAILY PRN
Status: DISCONTINUED | OUTPATIENT
Start: 2018-08-21 | End: 2018-08-23 | Stop reason: HOSPADM

## 2018-08-21 RX ORDER — BUPIVACAINE HYDROCHLORIDE 7.5 MG/ML
INJECTION, SOLUTION EPIDURAL; RETROBULBAR AS NEEDED
Status: DISCONTINUED | OUTPATIENT
Start: 2018-08-21 | End: 2018-08-21 | Stop reason: SURG

## 2018-08-21 RX ORDER — CALCIUM CARBONATE 200(500)MG
1000 TABLET,CHEWABLE ORAL DAILY PRN
Status: DISCONTINUED | OUTPATIENT
Start: 2018-08-21 | End: 2018-08-23 | Stop reason: HOSPADM

## 2018-08-21 RX ORDER — MORPHINE SULFATE 1 MG/ML
INJECTION, SOLUTION EPIDURAL; INTRATHECAL; INTRAVENOUS AS NEEDED
Status: DISCONTINUED | OUTPATIENT
Start: 2018-08-21 | End: 2018-08-21 | Stop reason: SURG

## 2018-08-21 RX ORDER — DIPHENHYDRAMINE HYDROCHLORIDE 50 MG/ML
25 INJECTION INTRAMUSCULAR; INTRAVENOUS EVERY 6 HOURS PRN
Status: DISCONTINUED | OUTPATIENT
Start: 2018-08-21 | End: 2018-08-23 | Stop reason: HOSPADM

## 2018-08-21 RX ORDER — METOCLOPRAMIDE HYDROCHLORIDE 5 MG/ML
INJECTION INTRAMUSCULAR; INTRAVENOUS AS NEEDED
Status: DISCONTINUED | OUTPATIENT
Start: 2018-08-21 | End: 2018-08-21 | Stop reason: SURG

## 2018-08-21 RX ORDER — OXYCODONE HYDROCHLORIDE AND ACETAMINOPHEN 5; 325 MG/1; MG/1
2 TABLET ORAL EVERY 4 HOURS PRN
Status: DISCONTINUED | OUTPATIENT
Start: 2018-08-22 | End: 2018-08-23 | Stop reason: HOSPADM

## 2018-08-21 RX ORDER — FENTANYL CITRATE 50 UG/ML
INJECTION, SOLUTION INTRAMUSCULAR; INTRAVENOUS AS NEEDED
Status: DISCONTINUED | OUTPATIENT
Start: 2018-08-21 | End: 2018-08-21 | Stop reason: SURG

## 2018-08-21 RX ORDER — ONDANSETRON 2 MG/ML
INJECTION INTRAMUSCULAR; INTRAVENOUS AS NEEDED
Status: DISCONTINUED | OUTPATIENT
Start: 2018-08-21 | End: 2018-08-21 | Stop reason: SURG

## 2018-08-21 RX ORDER — ONDANSETRON 2 MG/ML
4 INJECTION INTRAMUSCULAR; INTRAVENOUS EVERY 4 HOURS PRN
Status: ACTIVE | OUTPATIENT
Start: 2018-08-21 | End: 2018-08-22

## 2018-08-21 RX ORDER — OXYTOCIN/RINGER'S LACTATE 30/500 ML
62.5 PLASTIC BAG, INJECTION (ML) INTRAVENOUS CONTINUOUS
Status: DISPENSED | OUTPATIENT
Start: 2018-08-21 | End: 2018-08-21

## 2018-08-21 RX ORDER — KETOROLAC TROMETHAMINE 30 MG/ML
INJECTION, SOLUTION INTRAMUSCULAR; INTRAVENOUS AS NEEDED
Status: DISCONTINUED | OUTPATIENT
Start: 2018-08-21 | End: 2018-08-21 | Stop reason: SURG

## 2018-08-21 RX ORDER — OXYCODONE HYDROCHLORIDE AND ACETAMINOPHEN 5; 325 MG/1; MG/1
1 TABLET ORAL EVERY 4 HOURS PRN
Status: DISCONTINUED | OUTPATIENT
Start: 2018-08-22 | End: 2018-08-23 | Stop reason: HOSPADM

## 2018-08-21 RX ORDER — NALBUPHINE HCL 10 MG/ML
10 AMPUL (ML) INJECTION
Status: DISCONTINUED | OUTPATIENT
Start: 2018-08-21 | End: 2018-08-23 | Stop reason: HOSPADM

## 2018-08-21 RX ORDER — EPHEDRINE SULFATE 50 MG/ML
INJECTION, SOLUTION INTRAVENOUS AS NEEDED
Status: DISCONTINUED | OUTPATIENT
Start: 2018-08-21 | End: 2018-08-21 | Stop reason: SURG

## 2018-08-21 RX ORDER — SODIUM CHLORIDE, SODIUM LACTATE, POTASSIUM CHLORIDE, CALCIUM CHLORIDE 600; 310; 30; 20 MG/100ML; MG/100ML; MG/100ML; MG/100ML
75 INJECTION, SOLUTION INTRAVENOUS CONTINUOUS
Status: DISCONTINUED | OUTPATIENT
Start: 2018-08-21 | End: 2018-08-23 | Stop reason: HOSPADM

## 2018-08-21 RX ADMIN — KETOROLAC TROMETHAMINE 30 MG: 30 INJECTION, SOLUTION INTRAMUSCULAR at 16:01

## 2018-08-21 RX ADMIN — EPHEDRINE SULFATE 15 MG: 50 INJECTION, SOLUTION INTRAMUSCULAR; INTRAVENOUS; SUBCUTANEOUS at 08:35

## 2018-08-21 RX ADMIN — ONDANSETRON 4 MG: 2 INJECTION INTRAMUSCULAR; INTRAVENOUS at 08:58

## 2018-08-21 RX ADMIN — EPHEDRINE SULFATE 10 MG: 50 INJECTION, SOLUTION INTRAMUSCULAR; INTRAVENOUS; SUBCUTANEOUS at 09:05

## 2018-08-21 RX ADMIN — DOCUSATE SODIUM 100 MG: 100 CAPSULE, LIQUID FILLED ORAL at 18:44

## 2018-08-21 RX ADMIN — CEFAZOLIN SODIUM 1000 MG: 1 SOLUTION INTRAVENOUS at 08:28

## 2018-08-21 RX ADMIN — SODIUM CHLORIDE, SODIUM LACTATE, POTASSIUM CHLORIDE, AND CALCIUM CHLORIDE 125 ML/HR: .6; .31; .03; .02 INJECTION, SOLUTION INTRAVENOUS at 18:33

## 2018-08-21 RX ADMIN — EPHEDRINE SULFATE 50 MG: 50 INJECTION, SOLUTION INTRAMUSCULAR; INTRAVENOUS; SUBCUTANEOUS at 09:03

## 2018-08-21 RX ADMIN — SODIUM CITRATE AND CITRIC ACID MONOHYDRATE 30 ML: 500; 334 SOLUTION ORAL at 07:00

## 2018-08-21 RX ADMIN — FENTANYL CITRATE 15 MCG: 50 INJECTION, SOLUTION INTRAMUSCULAR; INTRAVENOUS at 08:32

## 2018-08-21 RX ADMIN — SODIUM CHLORIDE, SODIUM LACTATE, POTASSIUM CHLORIDE, AND CALCIUM CHLORIDE: .6; .31; .03; .02 INJECTION, SOLUTION INTRAVENOUS at 09:04

## 2018-08-21 RX ADMIN — MORPHINE SULFATE 0.15 MG: 1 INJECTION, SOLUTION EPIDURAL; INTRATHECAL; INTRAVENOUS at 08:32

## 2018-08-21 RX ADMIN — PHENYLEPHRINE HYDROCHLORIDE 50 MCG/MIN: 10 INJECTION INTRAVENOUS at 08:32

## 2018-08-21 RX ADMIN — Medication 62.5 MILLI-UNITS/MIN: at 11:00

## 2018-08-21 RX ADMIN — METOCLOPRAMIDE 10 MG: 5 INJECTION, SOLUTION INTRAMUSCULAR; INTRAVENOUS at 08:59

## 2018-08-21 RX ADMIN — Medication 250 MILLI-UNITS/MIN: at 08:56

## 2018-08-21 RX ADMIN — BUPIVACAINE HYDROCHLORIDE 1.6 ML: 7.5 INJECTION, SOLUTION EPIDURAL; RETROBULBAR at 08:32

## 2018-08-21 RX ADMIN — SODIUM CHLORIDE, SODIUM LACTATE, POTASSIUM CHLORIDE, AND CALCIUM CHLORIDE 1000 ML: .6; .31; .03; .02 INJECTION, SOLUTION INTRAVENOUS at 06:45

## 2018-08-21 RX ADMIN — SODIUM CHLORIDE, SODIUM LACTATE, POTASSIUM CHLORIDE, AND CALCIUM CHLORIDE: .6; .31; .03; .02 INJECTION, SOLUTION INTRAVENOUS at 08:15

## 2018-08-21 RX ADMIN — KETOROLAC TROMETHAMINE 30 MG: 30 INJECTION, SOLUTION INTRAMUSCULAR at 22:04

## 2018-08-21 RX ADMIN — SODIUM CHLORIDE, SODIUM LACTATE, POTASSIUM CHLORIDE, AND CALCIUM CHLORIDE 125 ML/HR: .6; .31; .03; .02 INJECTION, SOLUTION INTRAVENOUS at 07:22

## 2018-08-21 RX ADMIN — SODIUM CHLORIDE, SODIUM LACTATE, POTASSIUM CHLORIDE, AND CALCIUM CHLORIDE 125 ML/HR: .6; .31; .03; .02 INJECTION, SOLUTION INTRAVENOUS at 09:56

## 2018-08-21 RX ADMIN — SODIUM CHLORIDE, SODIUM LACTATE, POTASSIUM CHLORIDE, AND CALCIUM CHLORIDE: .6; .31; .03; .02 INJECTION, SOLUTION INTRAVENOUS at 09:41

## 2018-08-21 RX ADMIN — KETOROLAC TROMETHAMINE 30 MG: 30 INJECTION, SOLUTION INTRAMUSCULAR at 09:43

## 2018-08-21 NOTE — ANESTHESIA PROCEDURE NOTES
Spinal Block    Patient location during procedure: OR  Start time: 8/21/2018 8:32 AM  Reason for block: procedure for pain, at surgeon's request and primary anesthetic  Staffing  Anesthesiologist: Taylor Solis  Resident/CRNA: Tu FRANCO  Performed: anesthesiologist and resident/CRNA   Preanesthetic Checklist  Completed: patient identified, site marked, surgical consent, pre-op evaluation, timeout performed, IV checked, risks and benefits discussed and monitors and equipment checked  Spinal Block  Patient position: sitting  Prep: ChloraPrep  Patient monitoring: cardiac monitor and frequent blood pressure checks  Approach: midline  Location: L3-4  Injection technique: single-shot  Needle  Needle type: pencil-tip   Needle gauge: 25 G  Needle length: 10 cm  Assessment  Sensory level: T4  Injection Assessment:  negative aspiration for heme, no paresthesia on injection and positive aspiration for clear CSF    Post-procedure:  adhesive bandage applied, pressure dressing applied, secured with tape, site cleaned and sterile dressing applied

## 2018-08-21 NOTE — OP NOTE
OPERATIVE REPORT  PATIENT NAME: Vannessa Gale    :  1980  MRN: [id number]  Pt Location: [facility] OR ROOM 02    SURGERY DATE: 2018    Surgeon(s) and Role:     * Joey Song MD - Primary     * Jens Domingo MD - Assisting    Preop Diagnosis:  Previous  section complicating pregnancy   IUP at 39w3d  AMA  Hx of LEEP  Abnormal 1hr glucola    Post-Op Diagnosis Codes:  Same, delivered  Breech presentation    Procedure(s) (LRB):   SECTION () REPEAT (N/A)    Specimen(s):  ID Type Source Tests Collected by Time Destination   A :  Cord Blood Cord BLOOD GAS, VENOUS, CORD, BLOOD GAS, ARTERIAL, CORD Joey Song MD 2018 3883    B :  Tissue (Placenta on Hold) OB Only Placenta PLACENTA IN STORAGE Joey Song MD 2018 0900        Estimated Blood Loss:   800 mL    Drains:  Urethral Catheter Latex; Double-lumen 16 Fr  (Active)   Site Assessment Clean;Skin intact 2018  8:33 AM   Collection Container Standard drainage bag 2018  8:33 AM   Securement Method Securing device (Describe) 2018  8:33 AM   Number of days: 0       Anesthesia Type:   Spinal    Operative Indications:  Previous  section complicating pregnancy    Operative Findings:  1  Delivery of viable male on 18 at 0856, weight 7lbs 1oz;  Apgar scores of 7 at one minute and 9 at five minutes  2  Nuchal cord x1, loose and reduced  3  Normal appearing placenta with centrally-inserted 3 vessel cord  4  Clear amniotic fluid  5  Grossly normal uterus, tubes, and ovaries    Specimens:  1  Arterial and venous cord gases  Arterial pH: 7 320, Base excess: -4 5  Venous pH: 7 348, Base excess: -3 6  2  Cord blood  3  Segment of umbilical cord  4  Placenta to storage     Procedure Details  Decision was made to proceed with  section due to history of prior  section and patient desire for repeat  Patient was made aware of these findings and the proposed plan   Risks, benefits, possible complications, alternate treatment options, and expected outcomes were discussed with the patient  The patient agreed with the proposed plan and gave informed consent  The patient was taken to the operating room where she was properly identified to the OR staff and attending physician  She received spinal anesthesia by Dr Urban Columbus preoperatively  Patient was placed in dorsal supine position with left lateral tilt of the hips  Fetal heart tones were appreciated and found to be appropriate  A Mitchell catheter and SCDs were placed  The vagina was prepped with Betadine  The abdomen was prepped with Chloraprep and following appropriate drying time, the patient was draped in the usual sterile manner for a Pfannenstiel incision  The patient had received Ancef 1g IV pre-operatively for prophylaxis  A Time Out was held and the above information confirmed  The patient was identified as Vannessa Gale and the procedure verified as  Delivery  A Pfannenstiel incision was made and carried down through the underlying subcutaneous tissue to the fascia using a scalpel  Subcutaneous blood vessels were Bovie coagulated  The rectus fascia was then incised in the midline and extended laterally using Reed scissors  The superior edge of the fascia was grasped with Kocher clamps, tented upward, and the underlying muscle was bluntly dissected off  The inferior edge was grasped with Kocher clamps and cleared in similar fashion using sharp dissection with the scalpel  All anatomic layers were well-demarcated  The rectus muscles were  and the peritoneum was identified and subsequently entered  A blind finger sweep was performed and no adhesive disease was noted  The peritoneum was then extended longitudinally with blunt dissection  The [employer] was then inserted  A low transverse uterine incision was made with the scalpel and extended laterally with blunt dissection   The amnion was entered bluntly and noted to be clear  The fetus was noted to be in breech presentation  The surgeon's hand was placed into the uterine cavity  The presenting gluteal region was grasped and delivered through the uterine incision with the assistance of fundal pressure  Digital pressure was then applied to the left posterior knee to achieve flexion and the left thigh was guided away from the trunk with simultaneous trunk rotation in the opposite direction  The left leg was delivered  The right leg was delivered in a similar fashion  The fetus was delivered to the scapula  An index finger was slid along the right fetal scapula, over the shoulder, and into the antecubital fossa  The elbow was then swept downward to the chest, followed by delivery of the right arm  The trunk was then gently rotated clockwise and the left arm delivered in similar fashion  The trunk was then positioned along the surgeon's left forearm, with the legs straddling  The Smellie-Veit maneuver was employed and using gentle traction, the head was then delivered  The umbilical cord was clamped and cut  The infant was handed off to the  providers  Arterial and venous cord gases, cord blood, and a segment of umbilical cord were obtained for evaluation and promptly sent to the lab  The placenta delivered spontaneously with uterine fundal massage and was noted to have a 3 vessel cord  This was also sent to the lab for storage  A moist lap sponge was used to clear the cavity of clots and products of conception  The uterine incision was closed with a running locked suture of 0 Monocryl  A second layer of the same suture was used to imbricate the first  The left corner of the uterine incison was noted to be non-hemostatic  A figure-of-eight suture was placed without resolution of bleeding  Bovie coagulation was then used  Given the continued bleeding, the Youca.st was removed and the uterus was exteriorized for better visualization   Another figure-of-eight suture was placed which provided hemostasis  The uterus was returned to the abdomen and the incision was irrigated with warm normal saline  Good hemostasis was confirmed  The peritoneum was then closed with running, non-locked suture of 2-0 Plain  The fascia was then closed with a running suture of 0 Vicryl  One suture of 2-0 Plain was used to re-approximate the subcutaneous tissues in the midline  The skin was closed with 4-0 Vicryl on a Venancio needle in a subcuticular fashion  Histoacryl was applied  An abdominal binder was then placed  At the conclusion of the procedure, all needle, sponge, and instrument counts were noted to be correct x2  The patient tolerated the procedure well and was transferred to her the recovery room in stable condition  Fani Wilson MD was present and participated in all key portions of the case      Complications:   None    Patient Disposition:  PACU  and hemodynamically stable    SIGNATURE: Yi Saldivar MD  DATE: August 21, 2018  TIME: 10:02 AM

## 2018-08-21 NOTE — PROGRESS NOTES
At bedside in PACU to evaluate patient after notification of passage of clot by nursing  Per nursing, expressed quarter-sized clot with fundal pressure, but no continued bleeding or clot thereafter  Uterus firm, UOP adeqaute with 400cc removed since OR and VSS per nursing  Again, fundal pressure did not express any more clots or bleeding noted  Vitals:    08/21/18 1125   BP: 111/57   Pulse: 74   Resp: 18   Temp: 98 9 °F (37 2 °C)   SpO2: 100%      GEN: Laying in bed, breastfeeding baby   NAD  Abdomen: soft, non-distended, non-tender  Uterine fundus firm at umbilicus  : Mitchell in place with clear, yellow urine  Lochia WNL    Plan: POD #0 s/p RLTCS, stable  Will continue to monitor  If continues, will perform bimanual exam      Larisa Peterson MD  OBGYN, PGY-2  8/21/2018 11:47 AM

## 2018-08-21 NOTE — PROGRESS NOTES
Post Op Check  Duy Bajwa 30 y o  female MRN: [id number]  Unit/Bed#: -01 Encounter: [id number]    Subjective:  Pt states she is doing well  Pain well controlled  Pt reports "inhaling a [employer] cheeseburger" and vomited x1, but felt better after that  Denies any continued N/V  Breast feeding  Lochia WNL  Has not passed any more clots since PACU  /58 (BP Location: Right arm)   Pulse 78   Temp 97 9 °F (36 6 °C) (Oral)   Resp 18   Ht 5' 2" (1 575 m)   Wt 68 9 kg (152 lb)   LMP 11/18/2017   SpO2 98%   Breastfeeding?  Yes   BMI 27 80 kg/m²     I/O this shift:  In: 2000 [I V :2000]  Out: 1225 [Urine:625; Blood:600]    Lab Results   Component Value Date    WBC 7 56 08/21/2018    HGB 11 5 08/21/2018    HCT 35 1 08/21/2018    MCV 95 08/21/2018     08/21/2018       No results found for: GLUCOSE, CALCIUM, NA, K, CO2, CL, BUN, CREATININE    Physical Exam  Gen: Sitting up in bed, NAD  CVS:RRR, no m/r/g  Lungs:CTA b/l  Abdomen:soft, non-distended, hypoactive bowel sounds  Uterine fundus firm and non-tender -2cm below the umbilicus  Extremities: non-tender, SCDs on and on    A/P:POD#0 s/p RLTCS, stable  1)Continue routine post-op care  Mitchell catheter in place, making adequate UOP; remove in AM and f/u voiding trial  Diet: advance as tolerated  DVT PPx: SCDs  Encouraged incentive spirometry to reduce atelectasis and pneumonia risk  Encouraged ambulation as tolerated  Pre-op hgb 11 5--> f/u AM CBC    Ramonita Noel MD  OBGYN, PGY-2  8/21/2018 4:36 PM

## 2018-08-21 NOTE — ANESTHESIA POSTPROCEDURE EVALUATION
Post-Op Assessment Note      CV Status:  Stable    Mental Status:  Alert and awake    Hydration Status:  Euvolemic    PONV Controlled:  Controlled    Airway Patency:  Patent    Post Op Vitals Reviewed: Yes          Staff: CRNA           BP   118/59 (84)   Temp      Pulse  56   Resp  16   SpO2   99%

## 2018-08-22 LAB
BASOPHILS # BLD AUTO: 0.02 THOUSANDS/ΜL (ref 0–0.1)
BASOPHILS NFR BLD AUTO: 0 % (ref 0–1)
EOSINOPHIL # BLD AUTO: 0.03 THOUSAND/ΜL (ref 0–0.61)
EOSINOPHIL NFR BLD AUTO: 0 % (ref 0–6)
ERYTHROCYTE [DISTWIDTH] IN BLOOD BY AUTOMATED COUNT: 13.6 % (ref 11.6–15.1)
HCT VFR BLD AUTO: 24.2 % (ref 34.8–46.1)
HGB BLD-MCNC: 7.9 G/DL (ref 11.5–15.4)
IMM GRANULOCYTES # BLD AUTO: 0.03 THOUSAND/UL (ref 0–0.2)
IMM GRANULOCYTES NFR BLD AUTO: 0 % (ref 0–2)
LYMPHOCYTES # BLD AUTO: 1.8 THOUSANDS/ΜL (ref 0.6–4.47)
LYMPHOCYTES NFR BLD AUTO: 27 % (ref 14–44)
MCH RBC QN AUTO: 32 PG (ref 26.8–34.3)
MCHC RBC AUTO-ENTMCNC: 32.6 G/DL (ref 31.4–37.4)
MCV RBC AUTO: 98 FL (ref 82–98)
MONOCYTES # BLD AUTO: 0.49 THOUSAND/ΜL (ref 0.17–1.22)
MONOCYTES NFR BLD AUTO: 7 % (ref 4–12)
NEUTROPHILS # BLD AUTO: 4.38 THOUSANDS/ΜL (ref 1.85–7.62)
NEUTS SEG NFR BLD AUTO: 66 % (ref 43–75)
NRBC BLD AUTO-RTO: 0 /100 WBCS
PLATELET # BLD AUTO: 119 THOUSANDS/UL (ref 149–390)
PMV BLD AUTO: 9.6 FL (ref 8.9–12.7)
RBC # BLD AUTO: 2.47 MILLION/UL (ref 3.81–5.12)
WBC # BLD AUTO: 6.75 THOUSAND/UL (ref 4.31–10.16)

## 2018-08-22 PROCEDURE — 85025 COMPLETE CBC W/AUTO DIFF WBC: CPT | Performed by: OBSTETRICS & GYNECOLOGY

## 2018-08-22 PROCEDURE — 99024 POSTOP FOLLOW-UP VISIT: CPT | Performed by: OBSTETRICS & GYNECOLOGY

## 2018-08-22 RX ORDER — FERROUS SULFATE 325(65) MG
325 TABLET ORAL
Status: DISCONTINUED | OUTPATIENT
Start: 2018-08-22 | End: 2018-08-23 | Stop reason: HOSPADM

## 2018-08-22 RX ORDER — KETOROLAC TROMETHAMINE 30 MG/ML
30 INJECTION, SOLUTION INTRAMUSCULAR; INTRAVENOUS ONCE
Status: COMPLETED | OUTPATIENT
Start: 2018-08-22 | End: 2018-08-22

## 2018-08-22 RX ADMIN — DOCUSATE SODIUM 100 MG: 100 CAPSULE, LIQUID FILLED ORAL at 16:57

## 2018-08-22 RX ADMIN — OXYCODONE HYDROCHLORIDE AND ACETAMINOPHEN 1 TABLET: 5; 325 TABLET ORAL at 22:45

## 2018-08-22 RX ADMIN — KETOROLAC TROMETHAMINE 30 MG: 30 INJECTION, SOLUTION INTRAMUSCULAR at 11:16

## 2018-08-22 RX ADMIN — IBUPROFEN 600 MG: 600 TABLET, FILM COATED ORAL at 16:57

## 2018-08-22 RX ADMIN — KETOROLAC TROMETHAMINE 30 MG: 30 INJECTION, SOLUTION INTRAMUSCULAR at 04:44

## 2018-08-22 RX ADMIN — DOCUSATE SODIUM 100 MG: 100 CAPSULE, LIQUID FILLED ORAL at 09:31

## 2018-08-22 RX ADMIN — SODIUM CHLORIDE, SODIUM LACTATE, POTASSIUM CHLORIDE, AND CALCIUM CHLORIDE 125 ML/HR: .6; .31; .03; .02 INJECTION, SOLUTION INTRAVENOUS at 01:58

## 2018-08-22 RX ADMIN — FERROUS SULFATE TAB 325 MG (65 MG ELEMENTAL FE) 325 MG: 325 (65 FE) TAB at 09:31

## 2018-08-22 RX ADMIN — Medication 1 TABLET: at 09:31

## 2018-08-22 NOTE — PLAN OF CARE
BIRTH - VAGINAL/ SECTION     Fetal and maternal status remain reassuring during the birth process Completed     Emotionally satisfying birthing experience for mother/fetus Completed          DISCHARGE PLANNING     Discharge to home or other facility with appropriate resources Progressing        INFECTION - ADULT     Absence or prevention of progression during hospitalization Progressing     Absence of fever/infection during neutropenic period Progressing        Knowledge Deficit     Patient/family/caregiver demonstrates understanding of disease process, treatment plan, medications, and discharge instructions Progressing        PAIN - ADULT     Verbalizes/displays adequate comfort level or baseline comfort level Progressing        Potential for Falls     Patient will remain free of falls Progressing        SAFETY ADULT     Maintain or return to baseline ADL function Progressing     Maintain or return mobility status to optimal level Progressing

## 2018-08-22 NOTE — LACTATION NOTE
This note was copied from a baby's chart  CONSULT - LACTATION  Baby Boy  Juana Roche) Lawyer Rodriguez 1 days male MRN: [id number]    [facility] Room / Bed:  323(N)/ 323(N) Encounter: [id number]    Maternal Information     MOTHER:  Matt Sherwood  Maternal Age: 30 y o    OB History: #: 1, Date: 16, Sex: Female, Weight: 2863 g (6 lb 5 oz), GA: 39w3d, Delivery: , Low Transverse, Apgar1: 9, Apgar5: 9, Living: Living, Birth Comments: head,chest,abdomen measurements per baby sbar  at admission     #: 2, Date: 18, Sex: Male, Weight: 3210 g (7 lb 1 2 oz), GA: 39w3d, Delivery: , Low Transverse, Apgar1: 7, Apgar5: 9, Living: Living, Birth Comments: None   Previouse breast reduction surgery? No    Lactation history:   Has patient previously breast fed: Yes   How long had patient previously breast fed:     Previous breast feeding complications:  Other (Comment), Low milk supply (prolonged use of nipple shield)     Past Surgical History:   Procedure Laterality Date    CERVICAL BIOPSY  W/ LOOP ELECTRODE EXCISION       SECTION      COLPOSCOPY      MYOMECTOMY      resolved 2016    AR  DELIVERY ONLY N/A 2016 daughter    AR [address] [place] [address] N/A 2018    Procedure: [place] () REPEAT;  Surgeon: Barbara Pearl MD;  [facility];  Service: Obstetrics    VENTRAL HERNIA REPAIR      WISDOM TOOTH EXTRACTION         Birth information:  YOB: 2016   Time of birth: 8:56 AM   Sex: male   Delivery type: , Low Transverse   Birth Weight: 3210 g (7 lb 1 2 oz)   Percent of Weight Change: -6%     Gestational Age: 38w3d   [unfilled]    Assessment     Breast and nipple assessment: normal assessment     Assessment: normal assessment    Feeding assessment: feeding well  LATCH:  Latch: Grasps breast, tongue down, lips flanged, rhythmic sucking   Audible Swallowing: Spontaneous and intermittent (24 hours old)   Type of Nipple: Everted (After stimulation)   Comfort (Breast/Nipple): Soft/non-tender   Hold (Positioning): Full assist, teach one side, mother does other, staff holds   [facility] Score: 9          Feeding recommendations:  breast feed on demand     Discussed 2nd night syndrome and ways to calm infant  Hand out given  Information on hand expression given  Discussed benefits of knowing how to manually express breast including stimulating milk supply, softening nipple for latch and evacuating breast in the event of engorgement  Met with mother  Provided mother with Ready, Set, Baby booklet  Discussed Skin to Skin contact an benefits to mom and baby  Talked about the delay of the first bath until baby has adjusted  Spoke about the benefits of rooming in  Feeding on cue and what that means for recognizing infant's hunger  Avoidance of pacifiers for the first month discussed  Talked about exclusive breastfeeding for the first 6 months  Positioning and latch reviewed as well as showing images of other feeding positions  Discussed the properties of a good latch in any position  Reviewed hand/manual expression  Discussed s/s that baby is getting enough milk and some s/s that breastfeeding dyad may need further help  Gave information on common concerns, what to expect the first few weeks after delivery, preparing for other caregivers, and how partners can help  Resources for support also provided  Encouraged parents to watch breastfeeding class in the education area of My Chart Bedside  Powerpoint given on breastfeeding class at patient request     Spent time working on different positions that would facilitate better transfer of breastmilk  Gave suggestions on how to accomplish deep latch by starting latch with infant's nose at the nipple  Then, stroke the upper lip with the nipple   As infant opens mouth, insert nipple in on an upward angle so that the nipple impacts with the soft palate to increase comfort with the feeding and to keep infant interested in the feeding longer  Encoraged MOB and FOB to call for assistance, questions and concerns  Extension number for inpatient lactation support provided    Elva Lara RN 8/22/2018 7:04 AM

## 2018-08-22 NOTE — PROGRESS NOTES
Progress Note - OB/GYN   Rossana Wong 30 y o  female MRN: [id number]  Unit/Bed#: -01 Encounter: [id number]    Assessment:  Post-op Day #1 s/p RLTCS    Plan:  1  Post-op Hemoglobin: 7 9    - Consider ferrous sulfate po  2  Continue routine post partum care   Encourage ambulation   Encourage breastfeeding   Mitchell out at 5:30, void check today    Subjective: Patient is doing well  She has no complaints       Pain: yes, cramping, improved with meds  Tolerating PO: yes  Voiding: yes  Flatus: yes  BM: no   Ambulating: yes  Breastfeeding:  yes  Chest pain: no  Shortness of breath: no  Leg pain: no  Lochia: minimal    Objective:     Vitals: BP 92/60 (BP Location: Right arm)   Pulse 75   Temp 98 1 °F (36 7 °C) (Oral)   Resp 18   Ht 5' 2" (1 575 m)   Wt 68 9 kg (152 lb)   LMP 11/18/2017   SpO2 98%     BMI 27 80 kg/m²     Urine output: 2525; ~315cc/hour    Lab Results   Component Value Date    WBC 6 75 08/22/2018    HGB 7 9 (L) 08/22/2018    HCT 24 2 (L) 08/22/2018    MCV 98 08/22/2018     (L) 08/22/2018       Physical Exam:     Gen: AAOx3, NAD  CV: RRR  Lungs: CTAB  Abd: Soft, non-tender, non-distended, no rebound or guarding  Uterine fundus firm and non-tender, incision is clean, dry and intact   Ext: Non tender    Maryann Al MD  8/22/2018  6:12 AM

## 2018-08-23 VITALS
BODY MASS INDEX: 27.97 KG/M2 | TEMPERATURE: 98 F | RESPIRATION RATE: 20 BRPM | HEART RATE: 72 BPM | HEIGHT: 62 IN | WEIGHT: 152 LBS | SYSTOLIC BLOOD PRESSURE: 102 MMHG | DIASTOLIC BLOOD PRESSURE: 78 MMHG | OXYGEN SATURATION: 98 %

## 2018-08-23 RX ORDER — DIAPER,BRIEF,INFANT-TODD,DISP
1 EACH MISCELLANEOUS 4 TIMES DAILY PRN
Qty: 30 G | Refills: 0 | Status: SHIPPED | OUTPATIENT
Start: 2018-08-23 | End: 2018-09-14

## 2018-08-23 RX ORDER — IBUPROFEN 600 MG/1
600 TABLET ORAL EVERY 6 HOURS PRN
Qty: 30 TABLET | Refills: 0 | Status: SHIPPED | OUTPATIENT
Start: 2018-08-23 | End: 2022-01-12

## 2018-08-23 RX ORDER — ACETAMINOPHEN 325 MG/1
650 TABLET ORAL EVERY 4 HOURS PRN
Qty: 30 TABLET | Refills: 0 | Status: SHIPPED | OUTPATIENT
Start: 2018-08-23 | End: 2018-09-14 | Stop reason: HOSPADM

## 2018-08-23 RX ORDER — OXYCODONE HYDROCHLORIDE AND ACETAMINOPHEN 5; 325 MG/1; MG/1
1 TABLET ORAL EVERY 6 HOURS PRN
Qty: 10 TABLET | Refills: 0 | Status: SHIPPED | OUTPATIENT
Start: 2018-08-23 | End: 2018-09-14

## 2018-08-23 RX ORDER — DOCUSATE SODIUM 100 MG/1
100 CAPSULE, LIQUID FILLED ORAL 2 TIMES DAILY
Qty: 10 CAPSULE | Refills: 0 | Status: SHIPPED | OUTPATIENT
Start: 2018-08-23 | End: 2018-09-14

## 2018-08-23 RX ADMIN — Medication 1 TABLET: at 08:42

## 2018-08-23 RX ADMIN — OXYCODONE HYDROCHLORIDE AND ACETAMINOPHEN 1 TABLET: 5; 325 TABLET ORAL at 04:47

## 2018-08-23 RX ADMIN — FERROUS SULFATE TAB 325 MG (65 MG ELEMENTAL FE) 325 MG: 325 (65 FE) TAB at 08:42

## 2018-08-23 RX ADMIN — DOCUSATE SODIUM 100 MG: 100 CAPSULE, LIQUID FILLED ORAL at 08:42

## 2018-08-23 RX ADMIN — OXYCODONE HYDROCHLORIDE AND ACETAMINOPHEN 1 TABLET: 5; 325 TABLET ORAL at 13:13

## 2018-08-23 RX ADMIN — OXYCODONE HYDROCHLORIDE AND ACETAMINOPHEN 1 TABLET: 5; 325 TABLET ORAL at 08:59

## 2018-08-23 NOTE — PROGRESS NOTES
Postpartum Progress Note       PROCEDURE: Repeat Low  Delivery    SUBJECTIVE:    Pain: yes, controlled with percocet    Tolerating Oral Intake: yes     Voiding: yes    Flatus: yes    Bowel Movement: no    Ambulating: yes    Breastfeeding: yes    Chest Pain: no    Shortness of Breath: no    Leg Pain/Discomfort: no    Lochia: moderate    Other: Patient is comfortable at bedside, she reports of moderate pain controlled with percocet   Last dose at 4:30      OBJECTIVE:    General: No Acute Distress     Cardiovascular: Regular, Rate and Rhythm, no murmur, gallop or rub     Lungs: Clear to Auscultation Bilaterally, no wheezing, rhonchi or rales     Abdomen: Soft, non-distended, non-tender, no rebound, guarding or CVA tenderness     Fundus: Firm & Non-Tender     Fundal Location:  at the umbilicus    Lower Extremities: Non-Tender, no edema    Vitals:    18 0442   BP: 107/65   Pulse: 80   Resp: 18   Temp: 98 4 °F (36 9 °C)   SpO2:          Results from last 7 days  Lab Units 18  0515 18  0646   WBC Thousand/uL 6 75 7 56   HEMOGLOBIN g/dL 7 9* 11 5   HEMATOCRIT % 24 2* 35 1   PLATELETS Thousands/uL 119* 177   NEUTROS PCT % 66 58   MONOS PCT % 7 10           Invalid input(s): LABALBU      No results found for: PHOS           No results found for: TROPONINI  ABG:No results found for: PHART, TRC9QSN, PO2ART, IWI6YEC, E7XOSOPP, BEART, SOURCE  LABS / TESTS / MEDICATION:      Admission on 2018   Component Date Value    ABO Grouping 2018 O     Rh Factor 2018 Positive     Antibody Screen 2018 Negative     Specimen Expiration Date 2018 [id number]     WBC 2018 7 56     RBC 2018 3 68*    Hemoglobin 2018 11 5     Hematocrit 2018 35 1     MCV 2018 95     MCH 2018 31 3     MCHC 2018 32 8     RDW 2018 13 2     MPV 2018 10 3     Platelets  177     nRBC 2018 0     Neutrophils Relative 2018 58     Immat GRANS % 08/21/2018 0     Lymphocytes Relative 08/21/2018 31     Monocytes Relative 08/21/2018 10     Eosinophils Relative 08/21/2018 1     Basophils Relative 08/21/2018 0     Neutrophils Absolute 08/21/2018 4 37     Immature Grans Absolute 08/21/2018 0 03     Lymphocytes Absolute 08/21/2018 2 35     Monocytes Absolute 08/21/2018 0 74     Eosinophils Absolute 08/21/2018 0 05     Basophils Absolute 08/21/2018 0 02     RPR 08/21/2018 Non-Reactive     pH, Cord Chepe 08/21/2018 7 348     pCO2, Cord Chepe 08/21/2018 40 5     pO2, Cord Chepe 08/21/2018 19 3     HCO3, Cord Chepe 08/21/2018 21 8     Base Exc, Cord Chepe 08/21/2018 -3 6*    O2 Cont, Cord Chepe 08/21/2018 8 9     O2 HGB,VENOUS CORD 08/21/2018 39 4     pH, Cord Art 08/21/2018 7 320     pCO2, Cord Art 08/21/2018 42 5     pO2, Cord Art 08/21/2018 22 8     HCO3, Cord Art 08/21/2018 21 4     Base Exc, Cord Art 08/21/2018 -4 5*    O2 Content, Cord Art 08/21/2018 10 1     O2 Hgb, Arterial Cord 08/21/2018 45 2     WBC 08/22/2018 6 75     RBC 08/22/2018 2 47*    Hemoglobin 08/22/2018 7 9*    Hematocrit 08/22/2018 24 2*    MCV 08/22/2018 98     MCH 08/22/2018 32 0     MCHC 08/22/2018 32 6     RDW 08/22/2018 13 6     MPV 08/22/2018 9 6     Platelets 64/83/5166 119*    nRBC 08/22/2018 0     Neutrophils Relative 08/22/2018 66     Immat GRANS % 08/22/2018 0     Lymphocytes Relative 08/22/2018 27     Monocytes Relative 08/22/2018 7     Eosinophils Relative 08/22/2018 0     Basophils Relative 08/22/2018 0     Neutrophils Absolute 08/22/2018 4 38     Immature Grans Absolute 08/22/2018 0 03     Lymphocytes Absolute 08/22/2018 1 80     Monocytes Absolute 08/22/2018 0 49     Eosinophils Absolute 08/22/2018 0 03     Basophils Absolute 08/22/2018 0 02        Current Facility-Administered Medications   Medication Dose Route Frequency    acetaminophen (TYLENOL) tablet 650 mg  650 mg Oral Q6H PRN    calcium carbonate (TUMS) chewable tablet 1,000 mg  1,000 mg Oral Daily PRN    diphenhydrAMINE (BENADRYL) injection 25 mg  25 mg Intravenous Q6H PRN    diphenhydrAMINE (BENADRYL) tablet 25 mg  25 mg Oral Q6H PRN    docusate sodium (COLACE) capsule 100 mg  100 mg Oral BID    ferrous sulfate tablet 325 mg  325 mg Oral Daily With Breakfast    hydrocortisone 1 % cream 1 application  1 application Topical 4x Daily PRN    HYDROmorphone (DILAUDID) injection 0 2 mg  0 2 mg Intravenous Q15 Min PRN    ibuprofen (MOTRIN) tablet 600 mg  600 mg Oral Q6H PRN    lactated ringers infusion  125 mL/hr Intravenous Continuous    lactated ringers infusion  75 mL/hr Intravenous Continuous    nalbuphine (NUBAIN) injection 10 mg  10 mg Intravenous Q3H PRN    oxyCODONE-acetaminophen (PERCOCET) 5-325 mg per tablet 1 tablet  1 tablet Oral Q4H PRN    oxyCODONE-acetaminophen (PERCOCET) 5-325 mg per tablet 2 tablet  2 tablet Oral Q4H PRN    prenatal multivitamin tablet 1 tablet  1 tablet Oral Daily    simethicone (MYLICON) chewable tablet 80 mg  80 mg Oral Q6H PRN       ASSESSMENT AND PLAN:   Postpartum day # 2   Status post: RLTCS  1  Continue Routine Postpartum Care  2  Encourage Ambulation  3  Advance diet as tolerated  4  Plan Contraception discussed: Will discuss with OB at first postpartum appointment  Probably will stay on oral contraceptive pill  5  Pain control with percocet and motrin  6   D/c today    Signature/Title: Milad De Dios MD  Date: 8/23/2018  Time: 6:24 AM

## 2018-09-05 LAB — PLACENTA IN STORAGE: NORMAL

## 2018-09-06 ENCOUNTER — TELEPHONE (OUTPATIENT)
Dept: OBGYN CLINIC | Facility: MEDICAL CENTER | Age: 38
End: 2018-09-06

## 2018-09-14 ENCOUNTER — POSTPARTUM VISIT (OUTPATIENT)
Dept: OBGYN CLINIC | Facility: CLINIC | Age: 38
End: 2018-09-14

## 2018-09-14 VITALS
BODY MASS INDEX: 23.89 KG/M2 | WEIGHT: 129.8 LBS | HEIGHT: 62 IN | DIASTOLIC BLOOD PRESSURE: 62 MMHG | SYSTOLIC BLOOD PRESSURE: 108 MMHG

## 2018-09-14 DIAGNOSIS — Z11.51 ENCOUNTER FOR SCREENING FOR HUMAN PAPILLOMAVIRUS (HPV): ICD-10-CM

## 2018-09-14 DIAGNOSIS — Z12.4 CERVICAL CANCER SCREENING: ICD-10-CM

## 2018-09-14 DIAGNOSIS — Z30.09 ENCOUNTER FOR GENERAL COUNSELING AND ADVICE ON CONTRACEPTIVE MANAGEMENT: Primary | ICD-10-CM

## 2018-09-14 PROBLEM — Z34.93 ENCOUNTER FOR SUPERVISION OF NORMAL PREGNANCY IN THIRD TRIMESTER: Status: RESOLVED | Noted: 2018-06-18 | Resolved: 2018-09-14

## 2018-09-14 PROBLEM — Z98.891 PREVIOUS CESAREAN SECTION: Status: RESOLVED | Noted: 2018-06-18 | Resolved: 2018-09-14

## 2018-09-14 PROBLEM — Z98.891 STATUS POST REPEAT LOW TRANSVERSE CESAREAN SECTION: Status: RESOLVED | Noted: 2018-08-21 | Resolved: 2018-09-14

## 2018-09-14 PROBLEM — I86.3 VARICOSE VEINS OF VULVA AND PERINEUM: Status: RESOLVED | Noted: 2018-05-25 | Resolved: 2018-09-14

## 2018-09-14 PROBLEM — O43.102: Status: RESOLVED | Noted: 2018-04-06 | Resolved: 2018-09-14

## 2018-09-14 PROCEDURE — 99024 POSTOP FOLLOW-UP VISIT: CPT | Performed by: NURSE PRACTITIONER

## 2018-09-14 PROCEDURE — G0145 SCR C/V CYTO,THINLAYER,RESCR: HCPCS | Performed by: NURSE PRACTITIONER

## 2018-09-14 PROCEDURE — 87624 HPV HI-RISK TYP POOLED RSLT: CPT | Performed by: NURSE PRACTITIONER

## 2018-09-14 RX ORDER — ACETAMINOPHEN AND CODEINE PHOSPHATE 120; 12 MG/5ML; MG/5ML
1 SOLUTION ORAL DAILY
Qty: 90 TABLET | Refills: 3 | Status: SHIPPED | OUTPATIENT
Start: 2018-09-14 | End: 2019-08-13 | Stop reason: SDUPTHER

## 2018-09-14 NOTE — PROGRESS NOTES
Assessment/Plan:    Postpartum examination following  delivery  Normal postpartum exam  The patient may advance activity as tolerated and may resume sexual activity  She expresses interest in starting POP  for contraception  Reviewed risks/benefits, SE's/AE's and directions for use  Recommended starting this today  Advised of importance of compliance, discussed reasons to use back up and reviewed reasons to call  She will RTO for routine annual gyn exam in 3-6 mos; she reports she prefers closer to 6 mos - encouraged f/u PRN in the meantime The patient agrees with plan  Cervical cancer screening  Pap with cotesting collected today  Will advise as indicated  Diagnoses and all orders for this visit:    Postpartum examination following  delivery    Cervical cancer screening  -     Liquid-based pap, screening    Encounter for screening for human papillomavirus (HPV)          Subjective:      Patient ID: Denisha Turcios is a 30 y o  female  This patient presents for routine postpartum visit  She is s/p rLTC/s on 18  Prenatal and intrapartum course was uncomplicated  Since d/c home she has had no complaints  She denies abn bleeding, pelvic pain, breast complaints, bowel/bladder dysfunction, depression/anx  Baby is thriving  Breast feeding  EPDS score 4  Good support from family and spouse  Interested in [address] for contraception  The following portions of the patient's history were reviewed and updated as appropriate: allergies, current medications, past family history, past medical history, past social history, past surgical history and problem list     Review of Systems   Constitutional: Negative  HENT: Negative  Eyes: Negative  Respiratory: Negative  Cardiovascular: Negative  Gastrointestinal: Negative  Endocrine: Negative  Genitourinary: Negative  Musculoskeletal: Negative  Skin: Negative  Allergic/Immunologic: Negative      Neurological: Negative  Hematological: Negative  Psychiatric/Behavioral: Negative  Objective:      /62 (BP Location: Right arm, Cuff Size: Standard)   Ht 5' 2" (1 575 m)   Wt 58 9 kg (129 lb 12 8 oz)   LMP 11/18/2017   Breastfeeding? Yes   BMI 23 74 kg/m²          Physical Exam   Constitutional: She is oriented to person, place, and time  She appears well-developed and well-nourished  HENT:   Head: Normocephalic and atraumatic  Eyes: Pupils are equal, round, and reactive to light  Neck: Normal range of motion  Pulmonary/Chest: Effort normal    Abdominal: Hernia confirmed negative in the right inguinal area and confirmed negative in the left inguinal area  Genitourinary: Rectum normal and uterus normal        There is no rash, tenderness, lesion or injury on the right labia  There is no rash, tenderness, lesion or injury on the left labia  Cervix exhibits no motion tenderness, no discharge and no friability  Right adnexum displays no mass, no tenderness and no fullness  Left adnexum displays no mass, no tenderness and no fullness  No erythema, tenderness or bleeding in the vagina  Vaginal discharge found  Musculoskeletal: Normal range of motion  Lymphadenopathy:        Right: No inguinal adenopathy present  Left: No inguinal adenopathy present  Neurological: She is alert and oriented to person, place, and time  Skin: Skin is warm and dry  Psychiatric: She has a normal mood and affect   Her behavior is normal  Judgment and thought content normal

## 2018-09-14 NOTE — ASSESSMENT & PLAN NOTE
Normal postpartum exam  The patient may advance activity as tolerated and may resume sexual activity  She expresses interest in starting POP  for contraception  Reviewed risks/benefits, SE's/AE's and directions for use  Recommended starting this today  Advised of importance of compliance, discussed reasons to use back up and reviewed reasons to call  She will RTO for routine annual gyn exam in 3-6 mos; she reports she prefers closer to 6 mos - encouraged f/u PRN in the meantime The patient agrees with plan

## 2018-09-17 LAB
HPV HR 12 DNA CVX QL NAA+PROBE: NEGATIVE
HPV16 DNA CVX QL NAA+PROBE: NEGATIVE
HPV18 DNA CVX QL NAA+PROBE: NEGATIVE

## 2018-09-18 ENCOUNTER — TELEPHONE (OUTPATIENT)
Dept: OBGYN CLINIC | Facility: CLINIC | Age: 38
End: 2018-09-18

## 2018-09-18 LAB
LAB AP GYN PRIMARY INTERPRETATION: NORMAL
Lab: NORMAL

## 2018-10-11 ENCOUNTER — TELEPHONE (OUTPATIENT)
Dept: OBGYN CLINIC | Facility: CLINIC | Age: 38
End: 2018-10-11

## 2018-10-11 NOTE — TELEPHONE ENCOUNTER
Pt on Micronor - been on it 2 weeks  Takes pill same time daily - has spotting  Told pt micronor can cause spotting   It is not abnormal

## 2018-12-05 ENCOUNTER — TELEPHONE (OUTPATIENT)
Dept: OBGYN CLINIC | Facility: CLINIC | Age: 38
End: 2018-12-05

## 2018-12-05 NOTE — TELEPHONE ENCOUNTER
Spoke with pt - she had her   with G87  Last night she was giving the baby a bath and felt something around her incision  She noticed a scab on the right side of the incision had come off  It leaked a little clear fluid and blood - nothing since  She put neosporin and a band aid on it  No pain  Patient wants to know if there is anything else she needs to put on it or if it is ok to use neosporin  Please advise  Thanks!

## 2018-12-05 NOTE — TELEPHONE ENCOUNTER
Sounds benign   Ok to continue to observe   No need for neosporin but if she wishes to use this its ok

## 2019-05-10 ENCOUNTER — ANNUAL EXAM (OUTPATIENT)
Dept: OBGYN CLINIC | Facility: CLINIC | Age: 39
End: 2019-05-10
Payer: COMMERCIAL

## 2019-05-10 VITALS
HEIGHT: 62 IN | WEIGHT: 116.4 LBS | DIASTOLIC BLOOD PRESSURE: 70 MMHG | BODY MASS INDEX: 21.42 KG/M2 | RESPIRATION RATE: 14 BRPM | SYSTOLIC BLOOD PRESSURE: 120 MMHG

## 2019-05-10 DIAGNOSIS — Z01.419 ENCOUNTER FOR GYNECOLOGICAL EXAMINATION WITHOUT ABNORMAL FINDING: Primary | ICD-10-CM

## 2019-05-10 PROCEDURE — 99395 PREV VISIT EST AGE 18-39: CPT | Performed by: OBSTETRICS & GYNECOLOGY

## 2019-07-23 ENCOUNTER — OFFICE VISIT (OUTPATIENT)
Dept: OBGYN CLINIC | Facility: CLINIC | Age: 39
End: 2019-07-23
Payer: COMMERCIAL

## 2019-07-23 VITALS — BODY MASS INDEX: 21.22 KG/M2 | WEIGHT: 116 LBS | DIASTOLIC BLOOD PRESSURE: 74 MMHG | SYSTOLIC BLOOD PRESSURE: 124 MMHG

## 2019-07-23 DIAGNOSIS — T81.31XD WOUND DISRUPTION, POST-OP, SKIN, SUBSEQUENT ENCOUNTER: Primary | ICD-10-CM

## 2019-07-23 PROCEDURE — 99213 OFFICE O/P EST LOW 20 MIN: CPT | Performed by: NURSE PRACTITIONER

## 2019-07-26 PROBLEM — T81.31XD WOUND DISRUPTION, POST-OP, SKIN, SUBSEQUENT ENCOUNTER: Status: ACTIVE | Noted: 2019-07-26

## 2019-07-26 NOTE — ASSESSMENT & PLAN NOTE
Scabbed area at right distal aspect of incision was unroofed and a ~2mm foreign body was removed without difficulty with pickups  Discussed this being suture material vs dermabond  Recommended application of bactroban to this area BID for about one week  Continue to observe and f/u if bothersome sx continue   Reviewed reasons to call

## 2019-07-26 NOTE — PROGRESS NOTES
Assessment/Plan:    Wound disruption, post-op, skin, subsequent encounter  Scabbed area at right distal aspect of incision was unroofed and a ~2mm foreign body was removed without difficulty with pickups  Discussed this being suture material vs dermabond  Recommended application of bactroban to this area BID for about one week  Continue to observe and f/u if bothersome sx continue  Reviewed reasons to call        Diagnoses and all orders for this visit:    Wound disruption, post-op, skin, subsequent encounter  -     mupirocin (BACTROBAN) 2 % ointment; Apply topically 2 (two) times a day          Subjective:      Patient ID: Karon Rooney is a 41 y o  female  This patient presents with raised area on C/s scar   S/p LTCS 8/2018 - subQ suture and dermabond; uncomplicated postpartum course   At postpartum visit Willy Nelson applied something to the right edge of her incision, called this a granuloma   Since then she notes the right distal edge of the incision becomes periodically raised (<pea sized), then white stuff comes out when she picks it and it scabs over  Sometimes she can see a little black spot inside of this bump  She denies pain, bleeding, fever, chills or other skin complaints       The following portions of the patient's history were reviewed and updated as appropriate: allergies, current medications, past family history, past medical history, past social history, past surgical history and problem list     Review of Systems   Constitutional: Negative  Respiratory: Negative  Cardiovascular: Negative  Gastrointestinal: Negative  Genitourinary: Negative  Musculoskeletal: Negative  Skin: Negative  Right side of incision swells then drains   Neurological: Negative  Psychiatric/Behavioral: Negative  Objective:      /74   Wt 52 6 kg (116 lb)   BMI 21 22 kg/m²          Physical Exam   Constitutional: She is oriented to person, place, and time   She appears well-developed and well-nourished  HENT:   Head: Normocephalic  Eyes: Pupils are equal, round, and reactive to light  Neck: Normal range of motion  Pulmonary/Chest: Effort normal    Abdominal:       Neurological: She is alert and oriented to person, place, and time  Psychiatric: She has a normal mood and affect   Her behavior is normal  Judgment and thought content normal

## 2019-08-13 DIAGNOSIS — Z30.09 ENCOUNTER FOR GENERAL COUNSELING AND ADVICE ON CONTRACEPTIVE MANAGEMENT: ICD-10-CM

## 2019-08-14 RX ORDER — NORETHINDRONE 0.35 MG/1
TABLET ORAL
Qty: 90 TABLET | Refills: 4 | Status: SHIPPED | OUTPATIENT
Start: 2019-08-14 | End: 2019-08-15 | Stop reason: ALTCHOICE

## 2019-08-15 ENCOUNTER — TELEPHONE (OUTPATIENT)
Dept: OBGYN CLINIC | Facility: CLINIC | Age: 39
End: 2019-08-15

## 2019-08-15 DIAGNOSIS — Z30.41 SURVEILLANCE OF CONTRACEPTIVE PILL: Primary | ICD-10-CM

## 2019-08-15 RX ORDER — DROSPIRENONE AND ETHINYL ESTRADIOL 0.02-3(28)
1 KIT ORAL DAILY
Qty: 90 TABLET | Refills: 4 | Status: SHIPPED | OUTPATIENT
Start: 2019-08-15 | End: 2022-01-12

## 2019-08-15 NOTE — TELEPHONE ENCOUNTER
Pt said she really doesn't have a preference on a pill and cannot remember which one she use to be on

## 2020-05-11 ENCOUNTER — ANNUAL EXAM (OUTPATIENT)
Dept: OBGYN CLINIC | Facility: CLINIC | Age: 40
End: 2020-05-11
Payer: COMMERCIAL

## 2020-05-11 VITALS — WEIGHT: 125 LBS | SYSTOLIC BLOOD PRESSURE: 110 MMHG | DIASTOLIC BLOOD PRESSURE: 64 MMHG | BODY MASS INDEX: 22.86 KG/M2

## 2020-05-11 DIAGNOSIS — Z12.4 ENCOUNTER FOR PAPANICOLAOU SMEAR FOR CERVICAL CANCER SCREENING: ICD-10-CM

## 2020-05-11 DIAGNOSIS — Z30.41 SURVEILLANCE OF CONTRACEPTIVE PILL: ICD-10-CM

## 2020-05-11 DIAGNOSIS — Z01.419 ENCOUNTER FOR GYNECOLOGICAL EXAMINATION (GENERAL) (ROUTINE) WITHOUT ABNORMAL FINDINGS: Primary | ICD-10-CM

## 2020-05-11 PROBLEM — T81.31XD WOUND DISRUPTION, POST-OP, SKIN, SUBSEQUENT ENCOUNTER: Status: RESOLVED | Noted: 2019-07-26 | Resolved: 2020-05-11

## 2020-05-11 PROCEDURE — 99395 PREV VISIT EST AGE 18-39: CPT | Performed by: NURSE PRACTITIONER

## 2020-05-11 PROCEDURE — G0145 SCR C/V CYTO,THINLAYER,RESCR: HCPCS | Performed by: NURSE PRACTITIONER

## 2020-05-11 RX ORDER — DROSPIRENONE AND ETHINYL ESTRADIOL 0.02-3(28)
1 KIT ORAL DAILY
Qty: 90 TABLET | Refills: 4 | Status: SHIPPED | OUTPATIENT
Start: 2020-05-11 | End: 2022-01-12

## 2020-05-14 LAB
LAB AP GYN PRIMARY INTERPRETATION: NORMAL
Lab: NORMAL

## 2021-05-07 ENCOUNTER — OFFICE VISIT (OUTPATIENT)
Dept: INTERNAL MEDICINE CLINIC | Facility: CLINIC | Age: 41
End: 2021-05-07
Payer: COMMERCIAL

## 2021-05-07 VITALS
HEIGHT: 62 IN | OXYGEN SATURATION: 98 % | SYSTOLIC BLOOD PRESSURE: 110 MMHG | BODY MASS INDEX: 21.16 KG/M2 | WEIGHT: 115 LBS | DIASTOLIC BLOOD PRESSURE: 79 MMHG | HEART RATE: 62 BPM | TEMPERATURE: 98 F

## 2021-05-07 DIAGNOSIS — Z00.00 ANNUAL PHYSICAL EXAM: Primary | ICD-10-CM

## 2021-05-07 PROCEDURE — 3725F SCREEN DEPRESSION PERFORMED: CPT | Performed by: INTERNAL MEDICINE

## 2021-05-07 PROCEDURE — 99395 PREV VISIT EST AGE 18-39: CPT | Performed by: INTERNAL MEDICINE

## 2021-05-07 PROCEDURE — 3008F BODY MASS INDEX DOCD: CPT | Performed by: NURSE PRACTITIONER

## 2021-05-07 NOTE — PROGRESS NOTES
ADULT ANNUAL [address] [facility] INTERNAL MEDICINE    NAME: Hank Lyman  AGE: 47 y o  SEX: female  : 1980     DATE: 2021     Assessment and Plan:     Problem List Items Addressed This Visit     None      Visit Diagnoses     Annual physical exam    -  Primary          Immunizations and preventive care screenings were discussed with patient today  Appropriate education was printed on patient's after visit summary  Counseling:  · Alcohol/drug use: discussed moderation in alcohol intake, the recommendations for healthy alcohol use, and avoidance of illicit drug use  No follow-ups on file  Chief Complaint:     Chief Complaint   Patient presents with    Physical Exam    BMI F/U      History of Present Illness:     Adult Annual Physical   Patient here for a comprehensive physical exam  The patient reports no problems  Diet and Physical Activity  · Diet/Nutrition: well balanced diet and consuming 3-5 servings of fruits/vegetables daily  · Exercise: 5-7 times a week on average  Depression Screening  PHQ-9 Depression Screening    PHQ-9:   Frequency of the following problems over the past two weeks:      Little interest or pleasure in doing things: 0 - not at all  Feeling down, depressed, or hopeless: 0 - not at all  PHQ-2 Score: 0       General Health  · Sleep: gets 4-6 hours of sleep on average  · Hearing: normal - bilateral   · Vision: goes for regular eye exams and wears contacts  · Dental: regular dental visits  /GYN Health  · Last menstrual period: 21  · Contraceptive method: oral contraceptives  · History of STDs?: no      Review of Systems:     Review of Systems   Constitutional: Negative for appetite change, chills, fatigue and fever  HENT: Negative for sore throat and trouble swallowing  Eyes: Negative for redness  Respiratory: Negative for shortness of breath      Cardiovascular: Negative for chest pain and palpitations  Gastrointestinal: Negative for abdominal pain, constipation and diarrhea  Genitourinary: Negative for dysuria and hematuria  Musculoskeletal: Negative for back pain and neck pain  Skin: Negative for rash  Neurological: Negative for seizures, weakness and headaches  Hematological: Negative for adenopathy  Psychiatric/Behavioral: Negative for confusion  The patient is not nervous/anxious         Past Medical History:     Past Medical History:   Diagnosis Date    Abnormal Pap smear of cervix     with atypical squamous cells of undetermined sing (ASC-US)    Allergic rhinitis      delivery delivered 2016    Genital warts     HPV in female     Mild cervical dysplasia     Moderate dysplasia of cervix (ALISHA II)     Seasonal allergies     Subserosal leiomyoma of uterus     resolved     Varicella     had as child    Varicose veins of vulva and perineum 2018    Visual impairment     eyewear      Past Surgical History:     Past Surgical History:   Procedure Laterality Date    CERVICAL BIOPSY  W/ LOOP ELECTRODE EXCISION       SECTION      COLPOSCOPY      HIATAL HERNIA REPAIR      MYOMECTOMY      resolved     ME  DELIVERY ONLY N/A 2016    2016 daughter     [facility] N/A 2018    Procedure: Harini Candelaria () REPEAT;  Surgeon: Rick Tang MD;  Location: [facility];  Service: Obstetrics    VENTRAL HERNIA REPAIR      WISDOM TOOTH EXTRACTION        Social History:        Social History     Socioeconomic History    Marital status: /Civil Union     Spouse name: None    Number of children: None    Years of education: None    Highest education level: None   Occupational History    None   Social Needs    Financial resource strain: None    Food insecurity     Worry: None     Inability: None    Transportation needs     Medical: None     Non-medical: None   Tobacco Use    Smoking status: Never Smoker    Smokeless tobacco: Never Used   Substance and Sexual Activity    Alcohol use: No    Drug use: No    Sexual activity: Yes     Partners: Male     Birth control/protection: OCP   Lifestyle    Physical activity     Days per week: None     Minutes per session: None    Stress: None   Relationships    Social connections     Talks on phone: None     Gets together: None     Attends Ten[employer] service: None     Active member of club or organization: None     Attends meetings of clubs or organizations: None     Relationship status: None    Intimate partner violence     Fear of current or ex partner: None     Emotionally abused: None     Physically abused: None     Forced sexual activity: None   Other Topics Concern    None   Social History Narrative    Almost always uses seat belt    Daily coffee consumption ( 1 cups/day)    Exercising regularly        Alcohol: Socially    As per [employer]      Family History:     Family History   Problem Relation Age of Onset    Cancer Mother         skin melanoma    Cancer Father         Brain    Other Father         Glomus Jugulare Tumor    Cancer Paternal Aunt         breast as per allscripts    Diabetes Maternal Grandmother     Heart disease Maternal Grandmother         triple bypass surgery    Mental retardation Cousin     Alzheimer's disease Paternal Grandfather     Cancer Paternal Grandfather         colon      Current Medications:     Current Outpatient Medications   Medication Sig Dispense Refill    Drospirenone-Ethinyl Estradiol (GIANVI PO) Take by mouth      drospirenone-ethinyl estradiol (LA) 3-0 02 MG per tablet Take 1 tablet by mouth daily (Patient not taking: Reported on 5/7/2021) 90 tablet 4    drospirenone-ethinyl estradiol (LA) 3-0 02 MG per tablet Take 1 tablet by mouth daily 90 tablet 4    Ferrous Sulfate (SLOW FE PO) Take by mouth daily      ibuprofen (MOTRIN) 600 mg tablet Take 1 tablet (600 mg total) by mouth every 6 (six) hours as needed for moderate pain (Patient not taking: Reported on 7/23/2019) 30 tablet 0    mupirocin (BACTROBAN) 2 % ointment Apply topically 2 (two) times a day (Patient not taking: Reported on 5/11/2020) 22 g 2    Prenatal MV-Min-Fe Fum-FA-DHA (PRENATAL+DHA) 28-0 975 & 200 MG MISC Take 1 tablet by mouth daily       No current facility-administered medications for this visit  Allergies: Allergies   Allergen Reactions    Pollen Extract Allergic Rhinitis      Physical Exam:     /79 (BP Location: Left arm, Patient Position: Sitting, Cuff Size: Standard)   Pulse 62   Temp 98 °F (36 7 °C) (Axillary)   Ht 5' 2" (1 575 m)   Wt 52 2 kg (115 lb)   SpO2 98%   BMI 21 03 kg/m²     Physical Exam  Vitals signs and nursing note reviewed  Constitutional:       General: She is not in acute distress  Appearance: She is well-developed  HENT:      Head: Normocephalic and atraumatic  Nose: Nose normal       Mouth/Throat:      Mouth: Mucous membranes are moist    Eyes:      Conjunctiva/sclera: Conjunctivae normal    Neck:      Musculoskeletal: Neck supple  Cardiovascular:      Rate and Rhythm: Normal rate and regular rhythm  Heart sounds: No murmur  Pulmonary:      Effort: Pulmonary effort is normal  No respiratory distress  Breath sounds: Normal breath sounds  Abdominal:      Palpations: Abdomen is soft  Tenderness: There is no abdominal tenderness  Musculoskeletal: Normal range of motion  Skin:     General: Skin is warm and dry  Neurological:      General: No focal deficit present  Mental Status: She is alert            Beth Benitez MD   [facility] INTERNAL MEDICINE

## 2021-05-07 NOTE — PATIENT INSTRUCTIONS

## 2021-05-14 ENCOUNTER — ANNUAL EXAM (OUTPATIENT)
Dept: OBGYN CLINIC | Facility: CLINIC | Age: 41
End: 2021-05-14
Payer: COMMERCIAL

## 2021-05-14 VITALS — DIASTOLIC BLOOD PRESSURE: 76 MMHG | WEIGHT: 113 LBS | BODY MASS INDEX: 20.67 KG/M2 | SYSTOLIC BLOOD PRESSURE: 110 MMHG

## 2021-05-14 DIAGNOSIS — Z30.41 SURVEILLANCE OF CONTRACEPTIVE PILL: ICD-10-CM

## 2021-05-14 DIAGNOSIS — Z12.4 ENCOUNTER FOR PAPANICOLAOU SMEAR FOR CERVICAL CANCER SCREENING: ICD-10-CM

## 2021-05-14 DIAGNOSIS — Z01.419 ENCOUNTER FOR GYNECOLOGICAL EXAMINATION (GENERAL) (ROUTINE) WITHOUT ABNORMAL FINDINGS: Primary | ICD-10-CM

## 2021-05-14 DIAGNOSIS — Z11.51 SCREENING FOR HPV (HUMAN PAPILLOMAVIRUS): ICD-10-CM

## 2021-05-14 PROCEDURE — 1036F TOBACCO NON-USER: CPT | Performed by: NURSE PRACTITIONER

## 2021-05-14 PROCEDURE — 87624 HPV HI-RISK TYP POOLED RSLT: CPT | Performed by: NURSE PRACTITIONER

## 2021-05-14 PROCEDURE — G0145 SCR C/V CYTO,THINLAYER,RESCR: HCPCS | Performed by: NURSE PRACTITIONER

## 2021-05-14 PROCEDURE — 99395 PREV VISIT EST AGE 18-39: CPT | Performed by: NURSE PRACTITIONER

## 2021-05-14 RX ORDER — DROSPIRENONE AND ETHINYL ESTRADIOL 0.02-3(28)
1 KIT ORAL DAILY
Qty: 90 TABLET | Refills: 4 | Status: SHIPPED | OUTPATIENT
Start: 2021-05-14 | End: 2022-05-24 | Stop reason: SDUPTHER

## 2021-05-14 NOTE — PROGRESS NOTES
Assessment/Plan:    Surveillance of contraceptive pill  The patient likes current OCP  She denies ACHES and desires to continue  Refill provided  Reviewed reasons to call  Encounter for gynecological examination (general) (routine) without abnormal findings  Normal findings on routine gyn exam  Advised monthly SBE, annual CBE and baseline screening mammo at age 38  Reviewed ASCCP guidelines and recommended pap with cotesting q3 yrs for this low risk patient; this was collected today  STI testing was offered and the patient declines; she reports low risk  The patient desires to continue current contraceptive method (OCP)  Diet/activity recommendations:  Encouraged daily Ca++ and vitamin D intake as well as daily weight bearing exercise for promotion of bone health  RTO in one year or sooner PRN  Diagnoses and all orders for this visit:    Encounter for gynecological examination (general) (routine) without abnormal findings    Encounter for Papanicolaou smear for cervical cancer screening  -     Liquid-based pap, screening    Screening for HPV (human papillomavirus)  -     Liquid-based pap, screening    Surveillance of contraceptive pill  -     drospirenone-ethinyl estradiol (MoneyReefcemers) 3-0 02 MG per tablet; Take 1 tablet by mouth daily          Subjective:      Patient ID: Dunia Avendano is a 47 y o  female  This patient presents for routine annual gyn exam    Medically stable   Withdrawal bleeds are light and reg on ADELINA  Denies ACHES and desires to continue  She denies acute gyn complaints  Menses are regular and light to mod  She denies pelvic pain, breast concerns, abnormal discharge, bowel/bladder dysfunction, depression/anxiety   and monogamous  She denies STI concerns        Kids are well       The following portions of the patient's history were reviewed and updated as appropriate: allergies, current medications, past family history, past medical history, past social history, past surgical history and problem list     Review of Systems   Constitutional: Negative  Respiratory: Negative  Cardiovascular: Negative  Gastrointestinal: Negative  Genitourinary: Negative  Musculoskeletal: Negative  Skin: Negative  Neurological: Negative  Psychiatric/Behavioral: Negative  Objective:      /76   Wt 51 3 kg (113 lb)   LMP 05/07/2021   BMI 20 67 kg/m²          Physical Exam  Constitutional:       Appearance: She is well-developed  HENT:      Head: Normocephalic and atraumatic  Eyes:      Pupils: Pupils are equal, round, and reactive to light  Neck:      Musculoskeletal: Normal range of motion and neck supple  Thyroid: No thyromegaly  Cardiovascular:      Rate and Rhythm: Normal rate and regular rhythm  Heart sounds: Normal heart sounds  Pulmonary:      Effort: Pulmonary effort is normal  No respiratory distress  Breath sounds: Normal breath sounds  No wheezing or rales  Chest:      Chest wall: No mass, deformity or tenderness  Breasts: Breasts are symmetrical          Right: No inverted nipple, mass, nipple discharge, skin change or tenderness  Left: No inverted nipple, mass, nipple discharge, skin change or tenderness  Abdominal:      General: There is no distension  Palpations: Abdomen is soft  There is no hepatomegaly, splenomegaly or mass  Tenderness: There is no abdominal tenderness  There is no guarding or rebound  Genitourinary:     Labia:         Right: No rash, tenderness, lesion or injury  Left: No rash, tenderness, lesion or injury  Vagina: Normal  No foreign body  No vaginal discharge, erythema, tenderness or bleeding  Cervix: No cervical motion tenderness, discharge or friability  Adnexa:         Right: No mass, tenderness or fullness  Left: No mass, tenderness or fullness  Rectum: Normal    Musculoskeletal: Normal range of motion     Lymphadenopathy:      Cervical: No cervical adenopathy  Skin:     General: Skin is warm and dry  Findings: No rash  Nails: There is no clubbing  Neurological:      Mental Status: She is alert and oriented to person, place, and time  Cranial Nerves: No cranial nerve deficit  Psychiatric:         Speech: Speech normal          Behavior: Behavior normal          Thought Content:  Thought content normal          Judgment: Judgment normal

## 2021-05-17 LAB
25(OH)D3 SERPL-MCNC: 34 NG/ML (ref 30–100)
ALBUMIN SERPL-MCNC: 4.4 G/DL (ref 3.6–5.1)
ALBUMIN/GLOB SERPL: 1.7 (CALC) (ref 1–2.5)
ALP SERPL-CCNC: 87 U/L (ref 31–125)
ALT SERPL-CCNC: 25 U/L (ref 6–29)
APPEARANCE UR: CLEAR
AST SERPL-CCNC: 22 U/L (ref 10–30)
BACTERIA UR QL AUTO: ABNORMAL /HPF
BASOPHILS # BLD AUTO: 31 CELLS/UL (ref 0–200)
BASOPHILS NFR BLD AUTO: 0.6 %
BILIRUB SERPL-MCNC: 0.6 MG/DL (ref 0.2–1.2)
BILIRUB UR QL STRIP: NEGATIVE
BUN SERPL-MCNC: 11 MG/DL (ref 7–25)
BUN/CREAT SERPL: ABNORMAL (CALC) (ref 6–22)
CALCIUM SERPL-MCNC: 9.6 MG/DL (ref 8.6–10.2)
CHLORIDE SERPL-SCNC: 99 MMOL/L (ref 98–110)
CHOLEST SERPL-MCNC: 217 MG/DL
CHOLEST/HDLC SERPL: 3 (CALC)
CO2 SERPL-SCNC: 22 MMOL/L (ref 20–32)
COLOR UR: YELLOW
CREAT SERPL-MCNC: 0.74 MG/DL (ref 0.5–1.1)
EOSINOPHIL # BLD AUTO: 97 CELLS/UL (ref 15–500)
EOSINOPHIL NFR BLD AUTO: 1.9 %
ERYTHROCYTE [DISTWIDTH] IN BLOOD BY AUTOMATED COUNT: 12.3 % (ref 11–15)
GLOBULIN SER CALC-MCNC: 2.6 G/DL (CALC) (ref 1.9–3.7)
GLUCOSE SERPL-MCNC: 87 MG/DL (ref 65–99)
GLUCOSE UR QL STRIP: NEGATIVE
HCT VFR BLD AUTO: 39.3 % (ref 35–45)
HDLC SERPL-MCNC: 73 MG/DL
HGB BLD-MCNC: 13.3 G/DL (ref 11.7–15.5)
HGB UR QL STRIP: NEGATIVE
HPV HR 12 DNA CVX QL NAA+PROBE: NEGATIVE
HPV16 DNA CVX QL NAA+PROBE: NEGATIVE
HPV18 DNA CVX QL NAA+PROBE: NEGATIVE
HYALINE CASTS #/AREA URNS LPF: ABNORMAL /LPF
KETONES UR QL STRIP: NEGATIVE
LDLC SERPL CALC-MCNC: 121 MG/DL (CALC)
LEUKOCYTE ESTERASE UR QL STRIP: NEGATIVE
LYMPHOCYTES # BLD AUTO: 2453 CELLS/UL (ref 850–3900)
LYMPHOCYTES NFR BLD AUTO: 48.1 %
MCH RBC QN AUTO: 30.9 PG (ref 27–33)
MCHC RBC AUTO-ENTMCNC: 33.8 G/DL (ref 32–36)
MCV RBC AUTO: 91.2 FL (ref 80–100)
MONOCYTES # BLD AUTO: 546 CELLS/UL (ref 200–950)
MONOCYTES NFR BLD AUTO: 10.7 %
NEUTROPHILS # BLD AUTO: 1974 CELLS/UL (ref 1500–7800)
NEUTROPHILS NFR BLD AUTO: 38.7 %
NITRITE UR QL STRIP: NEGATIVE
NONHDLC SERPL-MCNC: 144 MG/DL (CALC)
PH UR STRIP: 6.5 [PH] (ref 5–8)
PLATELET # BLD AUTO: 294 THOUSAND/UL (ref 140–400)
PMV BLD REES-ECKER: 9.7 FL (ref 7.5–12.5)
POTASSIUM SERPL-SCNC: 4 MMOL/L (ref 3.5–5.3)
PROT SERPL-MCNC: 7 G/DL (ref 6.1–8.1)
PROT UR QL STRIP: NEGATIVE
RBC # BLD AUTO: 4.31 MILLION/UL (ref 3.8–5.1)
RBC #/AREA URNS HPF: ABNORMAL /HPF
SL AMB EGFR AFRICAN AMERICAN: 119 ML/MIN/1.73M2
SL AMB EGFR NON AFRICAN AMERICAN: 103 ML/MIN/1.73M2
SODIUM SERPL-SCNC: 133 MMOL/L (ref 135–146)
SP GR UR STRIP: 1.01 (ref 1–1.03)
SQUAMOUS #/AREA URNS HPF: ABNORMAL /HPF
TRIGL SERPL-MCNC: 123 MG/DL
TSH SERPL-ACNC: 3.16 MIU/L
VIT B12 SERPL-MCNC: 624 PG/ML (ref 200–1100)
WBC # BLD AUTO: 5.1 THOUSAND/UL (ref 3.8–10.8)
WBC #/AREA URNS HPF: ABNORMAL /HPF

## 2021-05-19 ENCOUNTER — TELEPHONE (OUTPATIENT)
Dept: INTERNAL MEDICINE CLINIC | Facility: CLINIC | Age: 41
End: 2021-05-19

## 2021-05-19 LAB
LAB AP GYN PRIMARY INTERPRETATION: NORMAL
Lab: NORMAL

## 2021-05-19 NOTE — TELEPHONE ENCOUNTER
Pt would like to go over lab results  She saw them on MyChart and noticed there are a few red alerts  She wants to know if there is any concern       # [phone]

## 2022-01-12 ENCOUNTER — OFFICE VISIT (OUTPATIENT)
Dept: OBGYN CLINIC | Facility: CLINIC | Age: 42
End: 2022-01-12
Payer: COMMERCIAL

## 2022-01-12 VITALS
SYSTOLIC BLOOD PRESSURE: 100 MMHG | WEIGHT: 125.4 LBS | BODY MASS INDEX: 24.62 KG/M2 | HEIGHT: 60 IN | DIASTOLIC BLOOD PRESSURE: 64 MMHG

## 2022-01-12 DIAGNOSIS — N64.4 BREAST PAIN, LEFT: ICD-10-CM

## 2022-01-12 PROBLEM — Z12.4 CERVICAL CANCER SCREENING: Status: RESOLVED | Noted: 2018-09-14 | Resolved: 2022-01-12

## 2022-01-12 PROBLEM — Z30.41 SURVEILLANCE OF CONTRACEPTIVE PILL: Status: RESOLVED | Noted: 2020-05-11 | Resolved: 2022-01-12

## 2022-01-12 PROBLEM — Z01.419 ENCOUNTER FOR GYNECOLOGICAL EXAMINATION (GENERAL) (ROUTINE) WITHOUT ABNORMAL FINDINGS: Status: RESOLVED | Noted: 2020-05-11 | Resolved: 2022-01-12

## 2022-01-12 PROCEDURE — 1036F TOBACCO NON-USER: CPT | Performed by: PHYSICIAN ASSISTANT

## 2022-01-12 PROCEDURE — 99213 OFFICE O/P EST LOW 20 MIN: CPT | Performed by: PHYSICIAN ASSISTANT

## 2022-01-12 PROCEDURE — 3008F BODY MASS INDEX DOCD: CPT | Performed by: PHYSICIAN ASSISTANT

## 2022-01-12 RX ORDER — MULTIVIT-MIN/IRON FUM/FOLIC AC 7.5 MG-4
1 TABLET ORAL DAILY
COMMUNITY
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
## 2022-02-02 ENCOUNTER — HOSPITAL ENCOUNTER (OUTPATIENT)
Dept: ULTRASOUND IMAGING | Facility: CLINIC | Age: 42
Discharge: HOME | End: 2022-02-02
Payer: COMMERCIAL

## 2022-02-02 ENCOUNTER — HOSPITAL ENCOUNTER (OUTPATIENT)
Dept: MAMMOGRAPHY | Facility: CLINIC | Age: 42
Discharge: HOME | End: 2022-02-02
Payer: COMMERCIAL

## 2022-02-02 VITALS — WEIGHT: 125 LBS | HEIGHT: 60 IN | BODY MASS INDEX: 24.54 KG/M2

## 2022-02-02 DIAGNOSIS — R92.8 ABNORMAL MAMMOGRAM: ICD-10-CM

## 2022-02-02 DIAGNOSIS — N64.4 BREAST PAIN, LEFT: ICD-10-CM

## 2022-02-02 PROCEDURE — 76642 ULTRASOUND BREAST LIMITED: CPT

## 2022-02-02 PROCEDURE — 77066 DX MAMMO INCL CAD BI: CPT

## 2022-02-02 PROCEDURE — G0279 TOMOSYNTHESIS, MAMMO: HCPCS

## 2022-02-02 NOTE — PROGRESS NOTES
Met with patient and Dr Dorna Goltz   regarding recommendation for;    __x___ RIGHT ______LEFT      __x___Ultrasound guided  ______Stereotactic breast biopsy  __X___Verbalized understanding        Blood thinners:  No: x_____ Yes: ______ What:                 Biopsy teaching sheet given:  Yes: ___X___ No: ________    Pt given contact information and adv to call with any questions/needs

## 2022-02-04 ENCOUNTER — HOSPITAL ENCOUNTER (OUTPATIENT)
Dept: MAMMOGRAPHY | Facility: CLINIC | Age: 42
Discharge: HOME | End: 2022-02-04
Payer: COMMERCIAL

## 2022-02-04 VITALS — SYSTOLIC BLOOD PRESSURE: 107 MMHG | DIASTOLIC BLOOD PRESSURE: 66 MMHG | HEART RATE: 68 BPM

## 2022-02-04 DIAGNOSIS — R92.8 ABNORMAL MAMMOGRAM: ICD-10-CM

## 2022-02-04 DIAGNOSIS — R92.2 INCONCLUSIVE MAMMOGRAM: ICD-10-CM

## 2022-02-04 PROCEDURE — 88305 TISSUE EXAM BY PATHOLOGIST: CPT | Performed by: PATHOLOGY

## 2022-02-04 PROCEDURE — 19081 BX BREAST 1ST LESION STRTCTC: CPT

## 2022-02-04 PROCEDURE — 77066 DX MAMMO INCL CAD BI: CPT

## 2022-02-04 PROCEDURE — A4648 IMPLANTABLE TISSUE MARKER: HCPCS

## 2022-02-04 RX ORDER — LIDOCAINE HYDROCHLORIDE AND EPINEPHRINE BITARTRATE 20; .01 MG/ML; MG/ML
10 INJECTION, SOLUTION SUBCUTANEOUS ONCE
Status: COMPLETED | OUTPATIENT
Start: 2022-02-04 | End: 2022-02-04

## 2022-02-04 RX ORDER — LIDOCAINE HYDROCHLORIDE 10 MG/ML
5 INJECTION, SOLUTION EPIDURAL; INFILTRATION; INTRACAUDAL; PERINEURAL ONCE
Status: COMPLETED | OUTPATIENT
Start: 2022-02-04 | End: 2022-02-04

## 2022-02-04 RX ADMIN — LIDOCAINE HYDROCHLORIDE 5 ML: 10 INJECTION, SOLUTION EPIDURAL; INFILTRATION; INTRACAUDAL; PERINEURAL at 14:16

## 2022-02-04 RX ADMIN — IOHEXOL 85 ML: 350 INJECTION, SOLUTION INTRAVENOUS at 13:17

## 2022-02-04 RX ADMIN — LIDOCAINE HYDROCHLORIDE AND EPINEPHRINE 10 ML: 20; 10 INJECTION, SOLUTION INFILTRATION; PERINEURAL at 14:16

## 2022-02-04 NOTE — PROGRESS NOTES
Procedure type:    _____ultrasound guided __x___stereotactic    Breast:    _____Left __x___Right    Location: 1 oclock    Needle: 8g Revolve    # of passes: 5 cores all submitted    Clip: Mammomarmichelet Johnson    Performed by: Dr Timothy Licea held for 5 minutes by: Peggy Cooper Strips:    __x___yes _____no    Band aid:    __x___yes_____no    Tape and guaze:    _____yes __x___no    Tolerated procedure:    __x___yes _____no

## 2022-02-04 NOTE — DISCHARGE INSTR - OTHER ORDERS
POST LARGE CORE BREAST BIOPSY PATIENT INFORMATION      Place an ice pack inside your bra over the top of the dressing every hour for 20 minutes (20 minutes on, 60 minutes off)  Do this until bedtime  Do not shower or bathe until the following morning  You may bathe your breast carefully with the steri-strips in place  Be careful    Not to loosen them  The steri-strips will fall off in 3-5 days  You may have mild discomfort, and you may have some bruising where the   Needle entered the skin  This should clear within 5-7 days  If you need medicine for discomfort, take acetaminophen products such as   Tylenol  You may also take Advil or Motrin products  Do not participate in strenuous activities such as-tennis, aerobics, skiing,  Weight lifting, etc  for 24 hours  Refrain from swimming/soaking for 72 hours  Wearing a bra for sleeping may be more comfortable for the first 24-48 hours  Watch for continued bleeding, pain or fever over 101; please call with any questions or concerns  For procedures done at the [facility]  [address] [address] call:  Maximo Mcclain RN at [phone]  Nyasia Almonte RN at [phone]                    *After 4 PM call the Interventional Radiology Department                    [phone] and ask to speak with the nurse on call  For procedures done at the [address] [place] [address] call:         Karissa Baca RN at   *After 4 PM call the Interventional Radiology Department   [phone] and ask to speak with the nurse on call  For procedures done at [address] [place] [address] call: The Radiology Nurse at [phone]  *After 4 PM call your physician, or go to the Emergency Department  9          The final results of your biopsy are usually available within one week

## 2022-02-04 NOTE — PROGRESS NOTES
Ice pack given:    __x___yes _____no    Discharge instructions signed by patient:    _x____yes _____no    Discharge instructions given to patient:    __x___yes _____no    Discharged via:    __x___ambulatory    _____wheelchair    _____stretcher    Stable on discharge:    __x___yes ____no

## 2022-02-07 ENCOUNTER — TELEPHONE (OUTPATIENT)
Dept: MAMMOGRAPHY | Facility: CLINIC | Age: 42
End: 2022-02-07

## 2022-02-21 ENCOUNTER — HOSPITAL ENCOUNTER (OUTPATIENT)
Dept: MAMMOGRAPHY | Facility: CLINIC | Age: 42
Discharge: HOME | End: 2022-02-21

## 2022-02-28 ENCOUNTER — TELEPHONE (OUTPATIENT)
Dept: OBGYN CLINIC | Facility: CLINIC | Age: 42
End: 2022-02-28

## 2022-02-28 ENCOUNTER — HOSPITAL ENCOUNTER (OUTPATIENT)
Dept: MAMMOGRAPHY | Facility: CLINIC | Age: 42
Discharge: HOME | End: 2022-02-28

## 2022-02-28 NOTE — TELEPHONE ENCOUNTER
Pt called asking if the code from her last visit  (1/12/22) can be change  Pt said that because of the mammogram order she received said diagnostic mammogram  instead of routing mammogram,she is getting a bill of $[phone] 05    Please advice! Thanks!

## 2022-03-22 NOTE — TELEPHONE ENCOUNTER
Pt calling again to have this rectified,  (she has already called billing), I made her aware this could take 4 wks,  Could you Please get this corrected, Thanks Bassam Jewell

## 2022-05-24 ENCOUNTER — ANNUAL EXAM (OUTPATIENT)
Dept: OBGYN CLINIC | Facility: CLINIC | Age: 42
End: 2022-05-24
Payer: COMMERCIAL

## 2022-05-24 VITALS
SYSTOLIC BLOOD PRESSURE: 110 MMHG | DIASTOLIC BLOOD PRESSURE: 72 MMHG | HEIGHT: 61 IN | BODY MASS INDEX: 22.92 KG/M2 | WEIGHT: 121.4 LBS

## 2022-05-24 DIAGNOSIS — Z30.41 SURVEILLANCE OF CONTRACEPTIVE PILL: ICD-10-CM

## 2022-05-24 DIAGNOSIS — Z01.419 ENCNTR FOR GYN EXAM (GENERAL) (ROUTINE) W/O ABN FINDINGS: Primary | ICD-10-CM

## 2022-05-24 DIAGNOSIS — Z12.31 ENCOUNTER FOR SCREENING MAMMOGRAM FOR MALIGNANT NEOPLASM OF BREAST: ICD-10-CM

## 2022-05-24 PROCEDURE — 3008F BODY MASS INDEX DOCD: CPT | Performed by: PHYSICIAN ASSISTANT

## 2022-05-24 PROCEDURE — 99395 PREV VISIT EST AGE 18-39: CPT | Performed by: PHYSICIAN ASSISTANT

## 2022-05-24 RX ORDER — CETIRIZINE HYDROCHLORIDE 10 MG/1
10 TABLET ORAL DAILY
COMMUNITY

## 2022-05-24 RX ORDER — DROSPIRENONE AND ETHINYL ESTRADIOL 0.02-3(28)
1 KIT ORAL DAILY
Qty: 90 TABLET | Refills: 4 | Status: SHIPPED | OUTPATIENT
Start: 2022-05-24 | End: 2023-05-24

## 2022-05-24 NOTE — PROGRESS NOTES
Pan Choi  1980      CC:  Yearly exam    S:  46 y o  female here for yearly exam  Her cycles are regular, not heavy or crampy  Sexual activity: She is sexually active without pain, bleeding or dryness  Contraception: She uses [place] for contraception  Last Pap 5/14/21 neg/neg  Last Mammo 2/2/22 abn, biopsy benign     We reviewed ASCCP guidelines for Pap testing today  Family hx of breast cancer: paternal aunt  Family hx of ovarian cancer: no  Family hx of colon cancer: PGF      Current Outpatient Medications:     cetirizine (ZyrTEC) 10 mg tablet, Take 10 mg by mouth in the morning , Disp: , Rfl:     cyanocobalamin (VITAMIN B-12) 100 MCG tablet, Take 100 mcg by mouth daily, Disp: , Rfl:     drospirenone-ethinyl estradiol (Gianvi) 3-0 02 MG per tablet, Take 1 tablet by mouth daily, Disp: 90 tablet, Rfl: 4    ELDERBERRY PO, Take by mouth, Disp: , Rfl:     Multiple Vitamins-Minerals (multivitamin with minerals) tablet, Take 1 tablet by mouth daily, Disp: , Rfl:   Social History     Socioeconomic History    Marital status: /Civil Union     Spouse name: Not on file    Number of children: Not on file    Years of education: Not on file    Highest education level: Not on file   Occupational History    Not on file   Tobacco Use    Smoking status: Never Smoker    Smokeless tobacco: Never Used   Vaping Use    Vaping Use: Never used   Substance and Sexual Activity    Alcohol use:  Yes    Drug use: No    Sexual activity: Yes     Partners: Male     Birth control/protection: OCP   Other Topics Concern    Not on file   Social History Narrative    Almost always uses seat belt    Daily coffee consumption ( 1 cups/day)    Exercising regularly        Alcohol: Socially    As per [employer]     Social Determinants of Health     Financial Resource Strain: Not on file   Food Insecurity: Not on file   Transportation Needs: Not on file   Physical Activity: Not on file   Stress: Not on file Social Connections: Not on file   Intimate Partner Violence: Not on file   Housing Stability: Not on file     Family History   Problem Relation Age of Onset    Cancer Mother         skin melanoma    Cancer Father         Brain    Other Father         Glomus Jugulare Tumor    Cancer Paternal Aunt         breast as per allscripts    Breast cancer Paternal Aunt 62    Breast cancer additional onset Paternal Aunt     Diabetes Maternal Grandmother     Heart disease Maternal Grandmother         triple bypass surgery    Mental retardation Cousin     Alzheimer's disease Paternal Grandfather     Cancer Paternal Grandfather         colon    Colon cancer Paternal Grandfather     No Known Problems Daughter     No Known Problems Son     Esophageal cancer Maternal Grandfather 80    No Known Problems Paternal Grandmother     No Known Problems Sister     No Known Problems Sister     No Known Problems Maternal Aunt     No Known Problems Maternal Aunt     No Known Problems Maternal Uncle     No Known Problems Paternal Uncle     Endometrial cancer Neg Hx     Ovarian cancer Neg Hx       Past Medical History:   Diagnosis Date    Abnormal Pap smear of cervix     with atypical squamous cells of undetermined sing (ASC-US)    Allergic rhinitis      delivery delivered 2016    Genital warts     HPV in female     Mild cervical dysplasia     Moderate dysplasia of cervix (ALISHA II)     Seasonal allergies     Subserosal leiomyoma of uterus     resolved     Varicella     had as child    Varicose veins of vulva and perineum 2018    Visual impairment     eyewear        Review of Systems   Respiratory: Negative  Cardiovascular: Negative  Gastrointestinal: Negative for constipation and diarrhea  Genitourinary: Negative for difficulty urinating, pelvic pain, vaginal bleeding, vaginal discharge, itching or odor      O:  Blood pressure 110/72, height 5' 1 42" (1 56 m), weight 55 1 kg (121 lb 6 4 oz), last menstrual period 05/06/2022, currently breastfeeding  Patient appears well and is not in distress  Neck is supple without masses  Breasts are symmetrical without mass, tenderness, nipple discharge, skin changes or adenopathy  Abdomen is soft and nontender without masses  External genitals are normal without lesions or rashes  Urethral meatus and urethra are normal  Bladder is normal to palpation  Vagina is normal without discharge or bleeding  Cervix is normal without discharge or lesion  Uterus is normal, mobile, nontender without palpable mass  Adnexa are normal, nontender, without palpable mass  A:   Yearly exam      P:   Pap 2026   Mammo slip provided    Anthony sent to pharmacy     RTO one year for yearly exam or sooner as needed

## 2022-11-10 ENCOUNTER — TELEPHONE (OUTPATIENT)
Dept: OBGYN CLINIC | Facility: CLINIC | Age: 42
End: 2022-11-10

## 2022-11-10 ENCOUNTER — APPOINTMENT (OUTPATIENT)
Dept: LAB | Facility: CLINIC | Age: 42
End: 2022-11-10

## 2022-11-10 DIAGNOSIS — R39.15 URINARY URGENCY: ICD-10-CM

## 2022-11-10 DIAGNOSIS — R39.15 URINARY URGENCY: Primary | ICD-10-CM

## 2022-11-10 LAB
BACTERIA UR QL AUTO: ABNORMAL /HPF
BILIRUB UR QL STRIP: NEGATIVE
CLARITY UR: CLEAR
COLOR UR: ABNORMAL
GLUCOSE UR STRIP-MCNC: NEGATIVE MG/DL
HGB UR QL STRIP.AUTO: NEGATIVE
KETONES UR STRIP-MCNC: NEGATIVE MG/DL
LEUKOCYTE ESTERASE UR QL STRIP: NEGATIVE
NITRITE UR QL STRIP: NEGATIVE
NON-SQ EPI CELLS URNS QL MICRO: ABNORMAL /HPF
PH UR STRIP.AUTO: 7.5 [PH]
PROT UR STRIP-MCNC: ABNORMAL MG/DL
RBC #/AREA URNS AUTO: ABNORMAL /HPF
SP GR UR STRIP.AUTO: 1.02 (ref 1–1.03)
UROBILINOGEN UR STRIP-ACNC: <2 MG/DL
WBC #/AREA URNS AUTO: ABNORMAL /HPF

## 2022-11-10 RX ORDER — NITROFURANTOIN 25; 75 MG/1; MG/1
CAPSULE ORAL
Qty: 10 CAPSULE | Refills: 0 | Status: SHIPPED | OUTPATIENT
Start: 2022-11-10

## 2022-11-11 LAB — BACTERIA UR CULT: NORMAL

## 2022-11-28 ENCOUNTER — OFFICE VISIT (OUTPATIENT)
Dept: OBGYN CLINIC | Facility: CLINIC | Age: 42
End: 2022-11-28

## 2022-11-28 VITALS
SYSTOLIC BLOOD PRESSURE: 110 MMHG | HEIGHT: 65 IN | BODY MASS INDEX: 21.76 KG/M2 | WEIGHT: 130.6 LBS | DIASTOLIC BLOOD PRESSURE: 60 MMHG

## 2022-11-28 DIAGNOSIS — B96.89 BV (BACTERIAL VAGINOSIS): Primary | ICD-10-CM

## 2022-11-28 DIAGNOSIS — N76.0 BV (BACTERIAL VAGINOSIS): Primary | ICD-10-CM

## 2022-11-28 LAB
BV WHIFF TEST VAG QL: ABNORMAL
CLUE CELLS SPEC QL WET PREP: ABNORMAL
PH SMN: ABNORMAL [PH]
SL AMB POCT WET MOUNT: ABNORMAL
T VAGINALIS VAG QL WET PREP: ABNORMAL
YEAST VAG QL WET PREP: ABNORMAL

## 2022-11-28 RX ORDER — METRONIDAZOLE 7.5 MG/G
GEL VAGINAL
Qty: 70 G | Refills: 0 | Status: SHIPPED | OUTPATIENT
Start: 2022-11-28

## 2022-11-28 RX ORDER — DIPHENOXYLATE HYDROCHLORIDE AND ATROPINE SULFATE 2.5; .025 MG/1; MG/1
1 TABLET ORAL DAILY
COMMUNITY

## 2022-11-28 NOTE — PROGRESS NOTES
Assessment/Plan:  Reviewed UA CS from 11/10/22 within normal limits  Bacterial vaginosis noted on wet mount  Rx sent to requested pharmacy  No sex during treatment  Complete all medication as directed  Consider daily probiotic  Call with any recurrence of symptoms  1  BV (bacterial vaginosis)  -     metroNIDAZOLE (METROGEL) 0 75 % vaginal gel; Insert one applicatorful intravaginally QHS x 5 nights  -     POCT wet mount               Subjective:      Patient ID: Jessie Martínez is a 38 y o  female  HPI    Jessie Martínez is a 38 y o   female who is here today for a problem visit   C/o urinary frequency, urgency and "fellling off" since early  November  She called the office and was sent to the lab for UA CS on 11/10/22 with normal results  She also completed 5 days of macrobid with no relief of symptoms  Admitted that she searched her symptoms on [employer] and is  convinced she has ovarian cancer  Monthly menses x 5 days with heavy flow x 2 days then mod to light flow  Menses is acceptable  Jessie Martínez is sexually active with male partner of 7 years  Denies pain, bleeding or dryness  She is  monogamous  She uses ADELINA  for contraception  She is not interested in STD screening today  She denies vaginal discharge, itching, pelvic pain  Bathes daily with dove bar soap  The following portions of the patient's history were reviewed and updated as appropriate: allergies, current medications, past family history, past medical history, past social history, past surgical history and problem list     Review of Systems   Constitutional: Negative  Gastrointestinal: Negative for abdominal pain, constipation, diarrhea, nausea and vomiting  Genitourinary: Positive for frequency, urgency and vaginal discharge  Negative for decreased urine volume, difficulty urinating, dyspareunia, dysuria, genital sores, menstrual problem, pelvic pain, vaginal bleeding and vaginal pain     Musculoskeletal: Negative for arthralgias and myalgias  Skin: Negative  Hematological: Negative for adenopathy  Psychiatric/Behavioral: Negative  All other systems reviewed and are negative  Objective:      /60 (BP Location: Left arm, Patient Position: Sitting, Cuff Size: Standard)   Ht 5' 5" (1 651 m)   Wt 59 2 kg (130 lb 9 6 oz)   LMP 11/14/2022 (Approximate)   BMI 21 73 kg/m²          Physical Exam  Vitals and nursing note reviewed  Constitutional:       Appearance: Normal appearance  She is well-developed  Genitourinary:     General: Normal vulva  Exam position: Lithotomy position  Labia:         Right: No rash, tenderness, lesion or injury  Left: No rash, tenderness, lesion or injury  Urethra: No prolapse, urethral pain, urethral swelling or urethral lesion  Vagina: No signs of injury and foreign body  Vaginal discharge (thin white) present  No erythema, tenderness or bleeding  Cervix: No cervical motion tenderness, discharge, friability, lesion, erythema or cervical bleeding  Uterus: Normal        Adnexa: Right adnexa normal and left adnexa normal         Right: No mass, tenderness or fullness  Left: No mass, tenderness or fullness  Rectum: No external hemorrhoid  Lymphadenopathy:      Lower Body: No right inguinal adenopathy  No left inguinal adenopathy  Skin:     General: Skin is warm and dry  Neurological:      Mental Status: She is alert and oriented to person, place, and time     Psychiatric:         Mood and Affect: Mood normal          Behavior: Behavior normal

## 2022-11-28 NOTE — PATIENT INSTRUCTIONS
Reviewed UA CS from 11/10/22 within normal limits  Bacterial vaginosis noted on wet mount  Rx sent to requested pharmacy  No sex during treatment  Complete all medication as directed  Consider daily probiotic  Call with any recurrence of symptoms

## 2022-12-20 ENCOUNTER — OFFICE VISIT (OUTPATIENT)
Dept: INTERNAL MEDICINE CLINIC | Facility: CLINIC | Age: 42
End: 2022-12-20

## 2022-12-20 VITALS
HEIGHT: 65 IN | HEART RATE: 70 BPM | WEIGHT: 131 LBS | DIASTOLIC BLOOD PRESSURE: 70 MMHG | TEMPERATURE: 98.4 F | OXYGEN SATURATION: 99 % | BODY MASS INDEX: 21.83 KG/M2 | SYSTOLIC BLOOD PRESSURE: 122 MMHG

## 2022-12-20 DIAGNOSIS — Z00.00 ANNUAL PHYSICAL EXAM: Primary | ICD-10-CM

## 2022-12-20 DIAGNOSIS — J30.89 NON-SEASONAL ALLERGIC RHINITIS, UNSPECIFIED TRIGGER: ICD-10-CM

## 2022-12-20 DIAGNOSIS — R63.5 WEIGHT GAIN: ICD-10-CM

## 2022-12-20 DIAGNOSIS — Z23 NEED FOR INFLUENZA VACCINATION: ICD-10-CM

## 2022-12-20 NOTE — PATIENT INSTRUCTIONS

## 2022-12-20 NOTE — PROGRESS NOTES
ADULT ANNUAL [address] [facility]NAL [facility]    NAME: Radha Foley  AGE: 38 y o  SEX: female  : 1980     DATE: 2022     Assessment and Plan:     Problem List Items Addressed This Visit    None  Visit Diagnoses     Annual physical exam    -  Primary    Relevant Orders    CBC and differential    Comprehensive metabolic panel    Lipid panel    Urinalysis with microscopic    TSH, 3rd generation    Vitamin D 25 hydroxy    Vitamin B12    Non-seasonal allergic rhinitis, unspecified trigger        Weight gain        Need for influenza vaccination        Relevant Orders    FLUZONE: influenza vaccine, quadrivalent, 0 5 mL (Completed)          Immunizations and preventive care screenings were discussed with patient today  Appropriate education was printed on patient's after visit summary  Counseling:  Exercise: the importance of regular exercise/physical activity was discussed  Recommend exercise 3-5 times per week for at least 30 minutes  Return in about 1 year (around 2023)  Chief Complaint:     Chief Complaint   Patient presents with   • Physical Exam      History of Present Illness:     Adult Annual Physical   Patient here for a comprehensive physical exam  The patient reports problems - weight gain  Diet and Physical Activity  Diet/Nutrition: well balanced diet and consuming 3-5 servings of fruits/vegetables daily  Exercise: moderate cardiovascular exercise, 5-7 times a week on average and 30-60 minutes on average  Depression Screening  PHQ-2/9 Depression Screening    Little interest or pleasure in doing things: 0 - not at all  Feeling down, depressed, or hopeless: 0 - not at all  PHQ-2 Score: 0  PHQ-2 Interpretation: Negative depression screen       General Health  Sleep: sleeps well and gets 4-6 hours of sleep on average     Hearing: normal - bilateral   Vision: no vision problems, most recent eye exam >1 year ago and wears contacts  Dental: regular dental visits and brushes teeth twice daily  /GYN Health  Patient is: premenopausal  Last menstrual period: 24  Contraceptive method: oral contraceptives  Review of Systems:     Review of Systems   Constitutional: Negative for appetite change, chills, fatigue and fever  HENT: Negative for sore throat and trouble swallowing  Eyes: Negative for redness  Respiratory: Negative for shortness of breath  Cardiovascular: Negative for chest pain and palpitations  Gastrointestinal: Negative for abdominal pain, constipation and diarrhea  Genitourinary: Negative for dysuria and hematuria  Musculoskeletal: Negative for back pain and neck pain  Skin: Negative for rash  Neurological: Negative for seizures, weakness and headaches  Hematological: Negative for adenopathy  Psychiatric/Behavioral: Negative for confusion  The patient is not nervous/anxious         Past Medical History:     Past Medical History:   Diagnosis Date   • Abnormal Pap smear of cervix     with atypical squamous cells of undetermined sing (ASC-US)   • Allergic rhinitis    •  delivery delivered 2016   • Genital warts    • HPV in female    • Mild cervical dysplasia    • Moderate dysplasia of cervix (ALISHA II)    • Seasonal allergies    • Subserosal leiomyoma of uterus     resolved    • Varicella     had as child   • Varicose veins of vulva and perineum 2018   • Visual impairment     eyewear      Past Surgical History:     Past Surgical History:   Procedure Laterality Date   • CERVICAL BIOPSY  W/ LOOP ELECTRODE EXCISION     •  SECTION     • COLPOSCOPY     • HIATAL HERNIA REPAIR     • MAMMO STEREOTACTIC BREAST BIOPSY RIGHT (ALL INC) Right 2022   • MYOMECTOMY      resolved    • CO  DELIVERY ONLY N/A 2016    2016 daughter   • CO  DELIVERY ONLY N/A 2018    Procedure:  SECTION () REPEAT;  Surgeon: Jane Cisse MD;  Location: [facility];  Service: Obstetrics   • VENTRAL HERNIA REPAIR     • WISDOM TOOTH EXTRACTION        Social History:     Social History     Socioeconomic History   • Marital status: /Civil Union     Spouse name: None   • Number of children: None   • Years of education: None   • Highest education level: None   Occupational History   • None   Tobacco Use   • Smoking status: Never   • Smokeless tobacco: Never   Vaping Use   • Vaping Use: Never used   Substance and Sexual Activity   • Alcohol use:  Yes   • Drug use: No   • Sexual activity: Yes     Partners: Male     Birth control/protection: OCP   Other Topics Concern   • None   Social History Narrative    Almost always uses seat belt    Daily coffee consumption ( 1 cups/day)    Exercising regularly        Alcohol: Socially    As per [employer]     Social Determinants of Health     Financial Resource Strain: Not on file   Food Insecurity: Not on file   Transportation Needs: Not on file   Physical Activity: Not on file   Stress: Not on file   Social Connections: Not on file   Intimate Partner Violence: Not on file   Housing Stability: Not on file      Family History:     Family History   Problem Relation Age of Onset   • Cancer Mother         skin melanoma   • Cancer Father         Brain   • Other Father         Glomus Jugulare Tumor   • Cancer Paternal Aunt         breast as per allscripts   • Breast cancer Paternal Aunt 62   • Breast cancer additional onset Paternal Aunt    • Diabetes Maternal Grandmother    • Heart disease Maternal Grandmother         triple bypass surgery   • Mental retardation Cousin    • Alzheimer's disease Paternal Grandfather    • Cancer Paternal Grandfather         colon   • Colon cancer Paternal Grandfather    • No Known Problems Daughter    • No Known Problems Son    • Esophageal cancer Maternal Grandfather 80   • No Known Problems Paternal Grandmother    • No Known Problems Sister    • No Known Problems Sister    • No Known Problems Maternal Aunt    • No Known Problems Maternal Aunt    • No Known Problems Maternal Uncle    • No Known Problems Paternal Uncle    • Endometrial cancer Neg Hx    • Ovarian cancer Neg Hx       Current Medications:     Current Outpatient Medications   Medication Sig Dispense Refill   • cetirizine (ZyrTEC) 10 mg tablet Take 10 mg by mouth in the morning  • cyanocobalamin (VITAMIN B-12) 100 MCG tablet Take 100 mcg by mouth daily     • drospirenone-ethinyl estradiol (Gianvi) 3-0 02 MG per tablet Take 1 tablet by mouth in the morning  90 tablet 4   • Multiple Vitamins-Minerals (multivitamin with minerals) tablet Take 1 tablet by mouth daily     • ELDERBERRY PO Take by mouth (Patient not taking: Reported on 11/28/2022)     • metroNIDAZOLE (METROGEL) 0 75 % vaginal gel Insert one applicatorful intravaginally QHS x 5 nights (Patient not taking: Reported on 12/20/2022) 70 g 0   • multivitamin (THERAGRAN) TABS Take 1 tablet by mouth daily (Patient not taking: Reported on 12/20/2022)     • nitrofurantoin (MACROBID) 100 mg capsule Bid x 5 days (Patient not taking: Reported on 11/28/2022) 10 capsule 0     No current facility-administered medications for this visit  Allergies: Allergies   Allergen Reactions   • Pollen Extract Allergic Rhinitis      Physical Exam:     /70 (BP Location: Left arm, Patient Position: Sitting, Cuff Size: Standard)   Pulse 70   Temp 98 4 °F (36 9 °C) (Temporal)   Ht 5' 5" (1 651 m)   Wt 59 4 kg (131 lb)   SpO2 99% Comment: RA  BMI 21 80 kg/m²     Physical Exam  Vitals and nursing note reviewed  Constitutional:       General: She is not in acute distress  Appearance: She is well-developed  HENT:      Head: Normocephalic and atraumatic        Right Ear: Tympanic membrane normal       Left Ear: Tympanic membrane normal       Nose: Nose normal       Mouth/Throat:      Mouth: Mucous membranes are moist    Eyes:      Conjunctiva/sclera: Conjunctivae normal    Cardiovascular:      Rate and Rhythm: Normal rate and regular rhythm  Heart sounds: No murmur heard  Pulmonary:      Effort: Pulmonary effort is normal  No respiratory distress  Breath sounds: Normal breath sounds  Abdominal:      Palpations: Abdomen is soft  Tenderness: There is no abdominal tenderness  Musculoskeletal:         General: No swelling  Cervical back: Neck supple  Skin:     General: Skin is warm and dry  Capillary Refill: Capillary refill takes less than 2 seconds  Neurological:      General: No focal deficit present  Mental Status: She is alert and oriented to person, place, and time     Psychiatric:         Mood and Affect: Mood normal           Tami Whitney MD  [facility]

## 2022-12-21 NOTE — PROGRESS NOTES
Assessment/Plan:           1  Annual physical exam  -     CBC and differential; Future  -     Comprehensive metabolic panel; Future  -     Lipid panel; Future  -     Urinalysis with microscopic  -     TSH, 3rd generation; Future  -     Vitamin D 25 hydroxy; Future  -     Vitamin B12; Future    2  Non-seasonal allergic rhinitis, unspecified trigger    3  Weight gain    4  Need for influenza vaccination  -     FLUZONE: influenza vaccine, quadrivalent, 0 5 mL         1  Annual physical exam    - CBC and differential; Future  - Comprehensive metabolic panel; Future  - Lipid panel; Future  - Urinalysis with microscopic  - TSH, 3rd generation; Future  - Vitamin D 25 hydroxy; Future  - Vitamin B12; Future    2  Non-seasonal allergic rhinitis, unspecified trigger      3  Weight gain      4  Need for influenza vaccination  - FLUZONE: influenza vaccine, quadrivalent, 0 5 mL       No problem-specific Assessment & Plan notes found for this encounter  Subjective:      Patient ID: Brittney Ramirez is a 38 y o  female  HPI    The following portions of the patient's history were reviewed and updated as appropriate: She  has a past medical history of Abnormal Pap smear of cervix, Allergic rhinitis,  delivery delivered (2016), Genital warts, HPV in female, Mild cervical dysplasia, Moderate dysplasia of cervix (ALISHA II), Seasonal allergies, Subserosal leiomyoma of uterus, Varicella, Varicose veins of vulva and perineum (2018), and Visual impairment  She There are no problems to display for this patient  She  has a past surgical history that includes Cervical biopsy w/ loop electrode excision; Colposcopy; Kiefer tooth extraction; pr  delivery only (N/A, 2016); Ventral hernia repair; Myomectomy;  section; pr  delivery only (N/A, 2018); Hiatal hernia repair; and Mammo stereotactic breast biopsy right (all inc) (Right, 2022)    Her family history includes Alzheimer's disease in her paternal grandfather; Breast cancer (age of onset: 62) in her paternal aunt; Breast cancer additional onset in her paternal aunt; Cancer in her father, mother, paternal aunt, and paternal grandfather; Colon cancer in her paternal grandfather; Diabetes in her maternal grandmother; Esophageal cancer (age of onset: 80) in her maternal grandfather; Heart disease in her maternal grandmother; Mental retardation in her cousin; No Known Problems in her daughter, maternal aunt, maternal aunt, maternal uncle, paternal grandmother, paternal uncle, sister, sister, and son; Other in her father  She  reports that she has never smoked  She has never used smokeless tobacco  She reports current alcohol use  She reports that she does not use drugs  Current Outpatient Medications   Medication Sig Dispense Refill   • cetirizine (ZyrTEC) 10 mg tablet Take 10 mg by mouth in the morning  • cyanocobalamin (VITAMIN B-12) 100 MCG tablet Take 100 mcg by mouth daily     • drospirenone-ethinyl estradiol (Gianvi) 3-0 02 MG per tablet Take 1 tablet by mouth in the morning  90 tablet 4   • Multiple Vitamins-Minerals (multivitamin with minerals) tablet Take 1 tablet by mouth daily     • ELDERBERRY PO Take by mouth (Patient not taking: Reported on 11/28/2022)     • metroNIDAZOLE (METROGEL) 0 75 % vaginal gel Insert one applicatorful intravaginally QHS x 5 nights (Patient not taking: Reported on 12/20/2022) 70 g 0   • multivitamin (THERAGRAN) TABS Take 1 tablet by mouth daily (Patient not taking: Reported on 12/20/2022)     • nitrofurantoin (MACROBID) 100 mg capsule Bid x 5 days (Patient not taking: Reported on 11/28/2022) 10 capsule 0     No current facility-administered medications for this visit  Current Outpatient Medications on File Prior to Visit   Medication Sig   • cetirizine (ZyrTEC) 10 mg tablet Take 10 mg by mouth in the morning     • cyanocobalamin (VITAMIN B-12) 100 MCG tablet Take 100 mcg by mouth daily   • drospirenone-ethinyl estradiol (Gianvi) 3-0 02 MG per tablet Take 1 tablet by mouth in the morning  • Multiple Vitamins-Minerals (multivitamin with minerals) tablet Take 1 tablet by mouth daily   • ELDERBERRY PO Take by mouth (Patient not taking: Reported on 11/28/2022)   • metroNIDAZOLE (METROGEL) 0 75 % vaginal gel Insert one applicatorful intravaginally QHS x 5 nights (Patient not taking: Reported on 12/20/2022)   • multivitamin (THERAGRAN) TABS Take 1 tablet by mouth daily (Patient not taking: Reported on 12/20/2022)   • nitrofurantoin (MACROBID) 100 mg capsule Bid x 5 days (Patient not taking: Reported on 11/28/2022)     No current facility-administered medications on file prior to visit  She is allergic to pollen extract       Review of Systems   Constitutional: Negative for appetite change, chills, fatigue and fever  HENT: Negative for sore throat and trouble swallowing  Eyes: Negative for redness  Respiratory: Negative for shortness of breath  Cardiovascular: Negative for chest pain and palpitations  Gastrointestinal: Negative for abdominal pain, constipation and diarrhea  Genitourinary: Negative for dysuria and hematuria  Musculoskeletal: Negative for back pain and neck pain  Skin: Negative for rash  Neurological: Negative for seizures, weakness and headaches  Hematological: Negative for adenopathy  Psychiatric/Behavioral: Negative for confusion  The patient is not nervous/anxious            Objective:      /70 (BP Location: Left arm, Patient Position: Sitting, Cuff Size: Standard)   Pulse 70   Temp 98 4 °F (36 9 °C) (Temporal)   Ht 5' 5" (1 651 m)   Wt 59 4 kg (131 lb)   SpO2 99% Comment: RA  BMI 21 80 kg/m²     Results Reviewed     None          Recent Results (from the past 1344 hour(s))   Urine culture    Collection Time: 11/10/22 12:37 PM    Specimen: Urine, Clean Catch   Result Value Ref Range    Urine Culture <10,000 cfu/ml    Urinalysis with microscopic Collection Time: 11/10/22 12:37 PM   Result Value Ref Range    Color, UA Light Yellow     Clarity, UA Clear     Specific [place], UA 1 019 1 003 - 1 030    pH, UA 7 5 4 5, 5 0, 5 5, 6 0, 6 5, 7 0, 7 5, 8 0    Leukocytes, UA Negative Negative    Nitrite, UA Negative Negative    Protein, UA Trace (A) Negative mg/dl    Glucose, UA Negative Negative mg/dl    Ketones, UA Negative Negative mg/dl    Urobilinogen, UA <2 0 <2 0 mg/dl mg/dl    Bilirubin, UA Negative Negative    Occult Blood, UA Negative Negative    RBC, UA 1-2 None Seen, 1-2 /hpf    WBC, UA 2-4 (A) None Seen, 1-2 /hpf    Epithelial Cells Occasional None Seen, Occasional /hpf    Bacteria, UA Innumerable (A) None Seen, Occasional /hpf   POCT wet mount    Collection Time: 11/28/22  2:26 PM   Result Value Ref Range    WET MOUNT abnormal     Yeast, Wet Prep neg     pH elevated     Whiff Test pos     Clue Cells pos     Trich, Wet Prep neg         Physical Exam  Constitutional:       General: She is not in acute distress  Appearance: Normal appearance  HENT:      Head: Normocephalic and atraumatic  Right Ear: Tympanic membrane normal       Left Ear: Tympanic membrane normal       Nose: Nose normal       Mouth/Throat:      Mouth: Mucous membranes are moist    Eyes:      Extraocular Movements: Extraocular movements intact  Pupils: Pupils are equal, round, and reactive to light  Cardiovascular:      Rate and Rhythm: Normal rate and regular rhythm  Pulses: Normal pulses  Heart sounds: Normal heart sounds  No murmur heard  No friction rub  Pulmonary:      Effort: Pulmonary effort is normal  No respiratory distress  Breath sounds: Normal breath sounds  No wheezing  Abdominal:      General: Abdomen is flat  Bowel sounds are normal  There is no distension  Palpations: Abdomen is soft  There is no mass  Tenderness: There is no abdominal tenderness  There is no guarding     Musculoskeletal:         General: Normal range of motion  Cervical back: Normal range of motion and neck supple  Skin:     General: Skin is warm  Neurological:      General: No focal deficit present  Mental Status: She is alert and oriented to person, place, and time  Mental status is at baseline  Cranial Nerves: No cranial nerve deficit     Psychiatric:         Mood and Affect: Mood normal          Behavior: Behavior normal

## 2022-12-28 LAB
25(OH)D3 SERPL-MCNC: 23 NG/ML (ref 30–100)
ALBUMIN SERPL-MCNC: 3.9 G/DL (ref 3.6–5.1)
ALBUMIN/GLOB SERPL: 1.6 (CALC) (ref 1–2.5)
ALP SERPL-CCNC: 86 U/L (ref 31–125)
ALT SERPL-CCNC: 10 U/L (ref 6–29)
APPEARANCE UR: CLEAR
AST SERPL-CCNC: 14 U/L (ref 10–30)
BACTERIA UR QL AUTO: NORMAL /HPF
BASOPHILS # BLD AUTO: 53 CELLS/UL (ref 0–200)
BASOPHILS NFR BLD AUTO: 0.7 %
BILIRUB SERPL-MCNC: 0.4 MG/DL (ref 0.2–1.2)
BILIRUB UR QL STRIP: NEGATIVE
BUN SERPL-MCNC: 10 MG/DL (ref 7–25)
BUN/CREAT SERPL: ABNORMAL (CALC) (ref 6–22)
CALCIUM SERPL-MCNC: 9.2 MG/DL (ref 8.6–10.2)
CHLORIDE SERPL-SCNC: 101 MMOL/L (ref 98–110)
CHOLEST SERPL-MCNC: 234 MG/DL
CHOLEST/HDLC SERPL: 3.1 (CALC)
CO2 SERPL-SCNC: 23 MMOL/L (ref 20–32)
COLOR UR: YELLOW
CREAT SERPL-MCNC: 0.67 MG/DL (ref 0.5–0.99)
EOSINOPHIL # BLD AUTO: 122 CELLS/UL (ref 15–500)
EOSINOPHIL NFR BLD AUTO: 1.6 %
ERYTHROCYTE [DISTWIDTH] IN BLOOD BY AUTOMATED COUNT: 12.3 % (ref 11–15)
GFR/BSA.PRED SERPLBLD CYS-BASED-ARV: 113 ML/MIN/1.73M2
GLOBULIN SER CALC-MCNC: 2.5 G/DL (CALC) (ref 1.9–3.7)
GLUCOSE SERPL-MCNC: 92 MG/DL (ref 65–99)
GLUCOSE UR QL STRIP: NEGATIVE
HCT VFR BLD AUTO: 34.5 % (ref 35–45)
HDLC SERPL-MCNC: 75 MG/DL
HGB BLD-MCNC: 11.8 G/DL (ref 11.7–15.5)
HGB UR QL STRIP: NEGATIVE
HYALINE CASTS #/AREA URNS LPF: NORMAL /LPF
KETONES UR QL STRIP: NEGATIVE
LDLC SERPL CALC-MCNC: 125 MG/DL (CALC)
LEUKOCYTE ESTERASE UR QL STRIP: NEGATIVE
LYMPHOCYTES # BLD AUTO: 3032 CELLS/UL (ref 850–3900)
LYMPHOCYTES NFR BLD AUTO: 39.9 %
MCH RBC QN AUTO: 30.2 PG (ref 27–33)
MCHC RBC AUTO-ENTMCNC: 34.2 G/DL (ref 32–36)
MCV RBC AUTO: 88.2 FL (ref 80–100)
MONOCYTES # BLD AUTO: 532 CELLS/UL (ref 200–950)
MONOCYTES NFR BLD AUTO: 7 %
NEUTROPHILS # BLD AUTO: 3861 CELLS/UL (ref 1500–7800)
NEUTROPHILS NFR BLD AUTO: 50.8 %
NITRITE UR QL STRIP: NEGATIVE
NONHDLC SERPL-MCNC: 159 MG/DL (CALC)
PH UR STRIP: 6.5 [PH] (ref 5–8)
PLATELET # BLD AUTO: 326 THOUSAND/UL (ref 140–400)
PMV BLD REES-ECKER: 9.7 FL (ref 7.5–12.5)
POTASSIUM SERPL-SCNC: 4.2 MMOL/L (ref 3.5–5.3)
PROT SERPL-MCNC: 6.4 G/DL (ref 6.1–8.1)
PROT UR QL STRIP: NEGATIVE
RBC # BLD AUTO: 3.91 MILLION/UL (ref 3.8–5.1)
RBC #/AREA URNS HPF: NORMAL /HPF
SODIUM SERPL-SCNC: 134 MMOL/L (ref 135–146)
SP GR UR STRIP: 1 (ref 1–1.03)
SQUAMOUS #/AREA URNS HPF: NORMAL /HPF
TRIGL SERPL-MCNC: 223 MG/DL
TSH SERPL-ACNC: 3.58 MIU/L
VIT B12 SERPL-MCNC: 1009 PG/ML (ref 200–1100)
WBC # BLD AUTO: 7.6 THOUSAND/UL (ref 3.8–10.8)
WBC #/AREA URNS HPF: NORMAL /HPF

## 2023-02-13 ENCOUNTER — HOSPITAL ENCOUNTER (OUTPATIENT)
Dept: MAMMOGRAPHY | Facility: HOSPITAL | Age: 43
Discharge: HOME | End: 2023-02-13

## 2023-02-13 VITALS — HEIGHT: 65 IN | WEIGHT: 130.95 LBS | BODY MASS INDEX: 21.82 KG/M2

## 2023-02-13 DIAGNOSIS — Z12.31 ENCOUNTER FOR SCREENING MAMMOGRAM FOR MALIGNANT NEOPLASM OF BREAST: ICD-10-CM

## 2023-06-13 ENCOUNTER — ANNUAL EXAM (OUTPATIENT)
Dept: OBGYN CLINIC | Facility: CLINIC | Age: 43
End: 2023-06-13
Payer: COMMERCIAL

## 2023-06-13 VITALS
SYSTOLIC BLOOD PRESSURE: 110 MMHG | DIASTOLIC BLOOD PRESSURE: 80 MMHG | WEIGHT: 131.4 LBS | BODY MASS INDEX: 24.81 KG/M2 | HEIGHT: 61 IN

## 2023-06-13 DIAGNOSIS — N92.0 MENORRHAGIA WITH REGULAR CYCLE: ICD-10-CM

## 2023-06-13 DIAGNOSIS — Z01.419 ENCNTR FOR GYN EXAM (GENERAL) (ROUTINE) W/O ABN FINDINGS: Primary | ICD-10-CM

## 2023-06-13 DIAGNOSIS — Z12.31 ENCOUNTER FOR SCREENING MAMMOGRAM FOR MALIGNANT NEOPLASM OF BREAST: ICD-10-CM

## 2023-06-13 PROCEDURE — S0612 ANNUAL GYNECOLOGICAL EXAMINA: HCPCS | Performed by: OBSTETRICS & GYNECOLOGY

## 2023-06-13 NOTE — PROGRESS NOTES
Simi Elliott  1980      CC:  Yearly exam    S:  38 y o  female here for yearly exam  Her cycles are regular on her OCP - but they are very heavy x 2 days where she can bleed through a super plus tampon  Kids are 4 and 6 - doing well! She denies vaginal discharge, itching, pelvic pain  She has no urinary concerns - some urinary frequency, does not have incontinence  No bowel concerns  No breast concerns  Sexual activity: She is sexually active without pain, bleeding or dryness  She is  and monogamous  She is not interested in STD screening today  Contraception: She uses Ajith Pito for contraception  Last Pap: 21 - NILM, Neg HPV  Last Mammo:  23 - BiRad 2, ordered for     We reviewed ASC guidelines for Pap testing today       Family hx of breast cancer: P Aunt  Family hx of ovarian cancer: no  Family hx of colon cancer: Yes      Current Outpatient Medications:   •  cetirizine (ZyrTEC) 10 mg tablet, Take 10 mg by mouth in the morning , Disp: , Rfl:   •  cyanocobalamin (VITAMIN B-12) 100 MCG tablet, Take 100 mcg by mouth daily, Disp: , Rfl:   •  drospirenone-ethinyl estradiol (Gianvi) 3-0 02 MG per tablet, Take 1 tablet by mouth in the morning , Disp: 90 tablet, Rfl: 4  •  multivitamin (THERAGRAN) TABS, Take 1 tablet by mouth daily, Disp: , Rfl:      Patient Active Problem List   Diagnosis   (none) - all problems resolved or deleted     Past Medical History:   Diagnosis Date   • Abnormal Pap smear of cervix     with atypical squamous cells of undetermined sing (ASC-US)   • Allergic rhinitis    •  delivery delivered 2016   • Genital warts    • HPV in female    • Mild cervical dysplasia    • Moderate dysplasia of cervix (ALISHA II)    • Seasonal allergies    • Subserosal leiomyoma of uterus     resolved    • Varicella     had as child   • Varicose veins of vulva and perineum 2018   • Visual impairment     eyewear     Family History   Problem "Relation Age of Onset   • Cancer Mother         skin melanoma   • Cancer Father         Brain   • Other Father         Glomus Jugulare Tumor   • Cancer Paternal Aunt         breast as per allscripts   • Breast cancer Paternal Aunt 62   • Breast cancer additional onset Paternal Aunt    • Diabetes Maternal Grandmother    • Heart disease Maternal Grandmother         triple bypass surgery   • Mental retardation Cousin    • Alzheimer's disease Paternal Grandfather    • Cancer Paternal Grandfather         colon   • Colon cancer Paternal Grandfather    • No Known Problems Daughter    • No Known Problems Son    • Esophageal cancer Maternal Grandfather 80   • No Known Problems Paternal Grandmother    • No Known Problems Sister    • No Known Problems Sister    • No Known Problems Maternal Aunt    • No Known Problems Maternal Aunt    • No Known Problems Maternal Uncle    • No Known Problems Paternal Uncle    • Endometrial cancer Neg Hx    • Ovarian cancer Neg Hx       Review of Systems   Respiratory: Negative  Cardiovascular: Negative  Gastrointestinal: Negative for constipation and diarrhea  O:  Blood pressure 110/80, height 5' 0 5\" (1 537 m), weight 59 6 kg (131 lb 6 4 oz), last menstrual period 06/02/2023, currently breastfeeding  Patient appears well and is not in distress  Breasts are symmetrical without mass, tenderness, nipple discharge, skin changes or adenopathy  Abdomen is soft and nontender without masses  External genitals are normal without lesions or rashes  Urethral meatus and urethra are normal  Bladder is normal to palpation  Vagina is normal without discharge or bleeding  Cervix is normal without discharge or lesion  Uterus is normal, mobile, nontender without palpable mass  Adnexa are normal, nontender, without palpable mass  A:  Yearly exam      P:   Pap & HPV up to date   Will check pelvic US - if normal then can consider change of OCP      Mammo ordered   RTO one year for yearly " exam or sooner as needed

## 2023-06-26 ENCOUNTER — HOSPITAL ENCOUNTER (OUTPATIENT)
Dept: RADIOLOGY | Age: 43
Discharge: HOME | End: 2023-06-26
Payer: COMMERCIAL

## 2023-06-26 DIAGNOSIS — N92.0 MENORRHAGIA WITH REGULAR CYCLE: ICD-10-CM

## 2023-06-26 PROCEDURE — 76856 US EXAM PELVIC COMPLETE: CPT

## 2023-06-26 PROCEDURE — 76830 TRANSVAGINAL US NON-OB: CPT

## 2023-07-03 ENCOUNTER — TELEPHONE (OUTPATIENT)
Dept: OBGYN CLINIC | Facility: CLINIC | Age: 43
End: 2023-07-03

## 2023-07-06 ENCOUNTER — TELEPHONE (OUTPATIENT)
Dept: OBGYN CLINIC | Facility: CLINIC | Age: 43
End: 2023-07-06

## 2023-07-07 ENCOUNTER — TELEPHONE (OUTPATIENT)
Dept: OBGYN CLINIC | Facility: CLINIC | Age: 43
End: 2023-07-07

## 2023-07-07 NOTE — TELEPHONE ENCOUNTER
----- Message from Sandi Thompson MD sent at 7/7/2023  9:43 AM EDT -----  US reviewed. Uterine lining with some abnormality - slight thickening and calcification. Advise EMB for further evaluation.

## 2023-07-11 ENCOUNTER — PROCEDURE VISIT (OUTPATIENT)
Age: 43
End: 2023-07-11
Payer: COMMERCIAL

## 2023-07-11 VITALS — BODY MASS INDEX: 25.74 KG/M2 | SYSTOLIC BLOOD PRESSURE: 102 MMHG | WEIGHT: 134 LBS | DIASTOLIC BLOOD PRESSURE: 64 MMHG

## 2023-07-11 DIAGNOSIS — R93.5 ABNORMAL ULTRASOUND OF UTERUS: Primary | ICD-10-CM

## 2023-07-11 PROCEDURE — 58100 BIOPSY OF UTERUS LINING: CPT | Performed by: OBSTETRICS & GYNECOLOGY

## 2023-07-11 PROCEDURE — 88305 TISSUE EXAM BY PATHOLOGIST: CPT | Performed by: PATHOLOGY

## 2023-07-12 NOTE — PROGRESS NOTES
Endometrial biopsy    Date/Time: 7/11/2023 3:20 PM    Performed by: Jesse Son MD  Authorized by: Jesse Son MD  [place] Protocol:  Consent: Verbal consent obtained.   Risks and benefits: risks, benefits and alternatives were discussed  Consent given by: patient  Patient understanding: patient states understanding of the procedure being performed  Patient identity confirmed: verbally with patient      Indication:     Indications comment:  Abnormal US and heavy menses  Pre-procedure:     Premeds:  Ibuprofen  Procedure:     Procedure: endometrial biopsy with Pipelle      A bivalve speculum was placed in the vagina: yes      Cervix cleaned and prepped: yes      The cervix was dilated: no      Uterus sounded: yes      Uterus sound depth (cm):  7    Specimen collected: specimen collected and sent to pathology      Patient tolerated procedure well with no complications: yes    Findings:     Uterus size:  Non-gravid    Cervix: normal      Adnexa: normal

## 2023-07-13 PROCEDURE — 88305 TISSUE EXAM BY PATHOLOGIST: CPT | Performed by: PATHOLOGY

## 2023-07-21 DIAGNOSIS — Z30.41 SURVEILLANCE OF CONTRACEPTIVE PILL: ICD-10-CM

## 2023-07-24 RX ORDER — DROSPIRENONE AND ETHINYL ESTRADIOL 0.02-3(28)
1 KIT ORAL DAILY
Qty: 90 TABLET | Refills: 0 | Status: SHIPPED | OUTPATIENT
Start: 2023-07-24 | End: 2024-07-23

## 2023-08-20 ENCOUNTER — OFFICE VISIT (OUTPATIENT)
Dept: URGENT CARE | Facility: CLINIC | Age: 43
End: 2023-08-20
Payer: COMMERCIAL

## 2023-08-20 VITALS
HEART RATE: 80 BPM | OXYGEN SATURATION: 99 % | RESPIRATION RATE: 16 BRPM | TEMPERATURE: 97.6 F | WEIGHT: 139 LBS | BODY MASS INDEX: 26.7 KG/M2

## 2023-08-20 DIAGNOSIS — L25.5 RHUS DERMATITIS: Primary | ICD-10-CM

## 2023-08-20 PROCEDURE — 99213 OFFICE O/P EST LOW 20 MIN: CPT | Performed by: FAMILY MEDICINE

## 2023-08-20 RX ORDER — PREDNISONE 20 MG/1
20 TABLET ORAL EVERY MORNING
Qty: 6 TABLET | Refills: 0 | Status: SHIPPED | OUTPATIENT
Start: 2023-08-20 | End: 2023-08-25

## 2023-08-20 RX ORDER — DROSPIRENONE AND ETHINYL ESTRADIOL 0.02-3(28)
1 KIT ORAL DAILY
COMMUNITY

## 2023-08-20 RX ORDER — PREDNISONE 20 MG/1
20 TABLET ORAL EVERY MORNING
Qty: 6 TABLET | Refills: 0 | Status: SHIPPED | OUTPATIENT
Start: 2023-08-20 | End: 2023-08-20 | Stop reason: SDUPTHER

## 2023-08-20 NOTE — PROGRESS NOTES
[facility]        NAME: Alin Elizalde is a 38 y.o. female  : 1980    MRN: [id number]  DATE: 2023  TIME: 9:25 AM    Assessment and Plan   Rhus dermatitis [L25.5]  1. Rhus dermatitis  predniSONE 20 mg tablet    diphenhydrAMINE (BENADRYL) 2 % cream        Will treat with 5 days of steroid taken in the morning followed by an evening dose of an antihistamine such as Zyrtec. Patient may then continue Zyrtec twice daily after completion of steroid until asymptomatic. Topical Benadryl as needed for breakthrough itching. Patient Instructions     Follow up with PCP in 3-5 days. Proceed to  ER if symptoms worsen. Chief Complaint     Chief Complaint   Patient presents with   • Rash     Pt presents with rash outbreak that started one week ago on right arm, leg, hip; post weeding; itching         History of Present Illness     37-year-old female presents today due to 1 week of a pruritic dermatitis located in various locations such as the right upper extremity, right knee and a new area near the right groin. Denies any other systemic symptoms. Has tried applying topical anti-itch agents, but itching continues to persist.  Has never had a reaction to poison plant in the past, but her parents have severe reactions to them. Review of Systems   Review of Systems   Constitutional: Negative for chills and fever. Respiratory: Negative for cough and shortness of breath. Cardiovascular: Negative for chest pain. Gastrointestinal: Negative for abdominal pain and nausea. Skin: Positive for rash. Allergic/Immunologic: Positive for environmental allergies. Neurological: Negative for dizziness and headaches.      Current Medications       Current Outpatient Medications:   •  cetirizine (ZyrTEC) 10 mg tablet, Take 10 mg by mouth in the morning., Disp: , Rfl:   •  cyanocobalamin (VITAMIN B-12) 100 MCG tablet, Take 100 mcg by mouth daily, Disp: , Rfl:   •  diphenhydrAMINE (BENADRYL) 2 % cream, Apply topically 3 (three) times a day as needed for itching or allergies, Disp: 15 g, Rfl: 0  •  drospirenone-ethinyl estradiol (Vestura) 3-0.02 MG per tablet, Take 1 tablet by mouth daily, Disp: , Rfl:   •  multivitamin (THERAGRAN) TABS, Take 1 tablet by mouth daily, Disp: , Rfl:   •  predniSONE 20 mg tablet, Take 1 tablet (20 mg total) by mouth every morning for 5 days ; take 2 tablets with your first dose., Disp: 6 tablet, Rfl: 0  •  drospirenone-ethinyl estradiol (Gianvi) 3-0.02 MG per tablet, Take 1 tablet by mouth daily (Patient not taking: Reported on 2023), Disp: 90 tablet, Rfl: 0    Current Allergies     Allergies as of 2023 - Reviewed 2023   Allergen Reaction Noted   • Pollen extract Allergic Rhinitis 2013            The following portions of the patient's history were reviewed and updated as appropriate: allergies, current medications, past family history, past medical history, past social history, past surgical history and problem list.     Past Medical History:   Diagnosis Date   • Abnormal Pap smear of cervix     with atypical squamous cells of undetermined sing (ASC-US)   • Allergic rhinitis    •  delivery delivered 2016   • Genital warts    • HPV in female    • Mild cervical dysplasia    • Moderate dysplasia of cervix (ALISHA II)    • Seasonal allergies    • Subserosal leiomyoma of uterus     resolved    • Varicella     had as child   • Varicose veins of vulva and perineum 2018   • Visual impairment     eyewear       Past Surgical History:   Procedure Laterality Date   • BREAST BIOPSY Right 2022    neg stereo   • CERVICAL BIOPSY  W/ LOOP ELECTRODE EXCISION     •  SECTION     • COLPOSCOPY     • HIATAL HERNIA REPAIR     • MAMMO STEREOTACTIC BREAST BIOPSY RIGHT (ALL INC) Right 2022   • MYOMECTOMY      resolved    • [place]  DELIVERY ONLY N/A 2016    2016 daughter   • [place]  DELIVERY ONLY N/A 2018    Procedure:  SECTION () REPEAT;  Surgeon: Ariel Pardo MD;  [facility];  Service: Obstetrics   • VENTRAL HERNIA REPAIR     • WISDOM TOOTH EXTRACTION         Family History   Problem Relation Age of Onset   • Cancer Mother         skin melanoma   • Cancer Father         Brain   • Other Father         Glomus Jugulare Tumor   • Cancer Paternal Aunt         breast as per allscripts   • Breast cancer Paternal Aunt 62   • Breast cancer additional onset Paternal Aunt    • Diabetes Maternal Grandmother    • Heart disease Maternal Grandmother         triple bypass surgery   • Mental retardation Cousin    • Alzheimer's disease Paternal Grandfather    • Cancer Paternal Grandfather         colon   • Colon cancer Paternal Grandfather    • No Known Problems Daughter    • No Known Problems Son    • Esophageal cancer Maternal Grandfather 80   • No Known Problems Paternal Grandmother    • No Known Problems Sister    • No Known Problems Sister    • No Known Problems Maternal Aunt    • No Known Problems Maternal Aunt    • No Known Problems Maternal Uncle    • No Known Problems Paternal Uncle    • Endometrial cancer Neg Hx    • Ovarian cancer Neg Hx          Medications have been verified. Objective   Pulse 80   Temp 97.6 °F (36.4 °C)   Resp 16   Wt 63 kg (139 lb)   LMP 2023 (Approximate)   SpO2 99%   Breastfeeding No   BMI 26.70 kg/m²   Patient's last menstrual period was 2023 (approximate). Physical Exam     Physical Exam  Vitals and nursing note reviewed. Constitutional:       General: She is in acute distress. Appearance: Normal appearance. She is normal weight. She is not ill-appearing, toxic-appearing or diaphoretic. HENT:      Head: Normocephalic and atraumatic. Eyes:      General:         Right eye: No discharge. Left eye: No discharge.       Conjunctiva/sclera: Conjunctivae normal.   Pulmonary:      Effort: Pulmonary effort is normal.   Skin:     General: Skin is warm. Findings: Lesion and rash present. No erythema. Neurological:      General: No focal deficit present. Mental Status: She is alert and oriented to person, place, and time. Psychiatric:         Mood and Affect: Mood normal.         Behavior: Behavior normal.         Thought Content:  Thought content normal.         Judgment: Judgment normal.

## 2023-09-06 ENCOUNTER — OFFICE VISIT (OUTPATIENT)
Dept: INTERNAL MEDICINE CLINIC | Facility: CLINIC | Age: 43
End: 2023-09-06
Payer: COMMERCIAL

## 2023-09-06 VITALS
BODY MASS INDEX: 25.98 KG/M2 | HEART RATE: 82 BPM | TEMPERATURE: 98.7 F | OXYGEN SATURATION: 99 % | SYSTOLIC BLOOD PRESSURE: 110 MMHG | HEIGHT: 61 IN | WEIGHT: 137.6 LBS | DIASTOLIC BLOOD PRESSURE: 60 MMHG

## 2023-09-06 DIAGNOSIS — L30.9 DERMATITIS: Primary | ICD-10-CM

## 2023-09-06 PROCEDURE — 99213 OFFICE O/P EST LOW 20 MIN: CPT | Performed by: INTERNAL MEDICINE

## 2023-09-06 RX ORDER — TRIAMCINOLONE ACETONIDE 1 MG/G
CREAM TOPICAL 2 TIMES DAILY
Qty: 80 G | Refills: 0 | Status: SHIPPED | OUTPATIENT
Start: 2023-09-06

## 2023-09-06 NOTE — PROGRESS NOTES
Assessment/Plan:           1. Dermatitis  Comments:  Most likely allergic dermatitis continue cetirizine and Benadryl as needed. Topical triamcinolone advised. Orders:  -     triamcinolone (KENALOG) 0.1 % cream; Apply topically 2 (two) times a day  -     Lyme Disease Serology w/Reflex; Future           1. Dermatitis    - triamcinolone (KENALOG) 0.1 % cream; Apply topically 2 (two) times a day  Dispense: 80 g; Refill: 0  - Lyme Disease Serology w/Reflex; Future       No problem-specific Assessment & Plan notes found for this encounter. Subjective:      Patient ID: Osbaldo Calvert is a 38 y.o. female. HPI    The following portions of the patient's history were reviewed and updated as appropriate: She  has a past medical history of Abnormal Pap smear of cervix, Allergic rhinitis,  delivery delivered (2016), Genital warts, HPV in female, Mild cervical dysplasia, Moderate dysplasia of cervix (ALISHA II), Seasonal allergies, Subserosal leiomyoma of uterus, Varicella, Varicose veins of vulva and perineum (2018), and Visual impairment. She There are no problems to display for this patient. She  has a past surgical history that includes Cervical biopsy w/ loop electrode excision; Colposcopy; [place] tooth extraction; pr  delivery only (N/A, 2016); Ventral hernia repair; Myomectomy;  section; pr  delivery only (N/A, 2018); Hiatal hernia repair; Mammo stereotactic breast biopsy right (all inc) (Right, 2022); and Breast biopsy (Right, 2022). Her family history includes Alzheimer's disease in her paternal grandfather; Breast cancer (age of onset: 62) in her paternal aunt; Breast cancer additional onset in her paternal aunt;  Cancer in her father, mother, paternal aunt, and paternal grandfather; Colon cancer in her paternal grandfather; Diabetes in her maternal grandmother; Esophageal cancer (age of onset: 80) in her maternal grandfather; Heart disease in her maternal grandmother; Mental retardation in her cousin; No Known Problems in her daughter, maternal aunt, maternal aunt, maternal uncle, paternal grandmother, paternal uncle, sister, sister, and son; Other in her father. She  reports that she has never smoked. She has been exposed to tobacco smoke. She has never used smokeless tobacco. She reports current alcohol use. She reports that she does not use drugs. Current Outpatient Medications   Medication Sig Dispense Refill   • cetirizine (ZyrTEC) 10 mg tablet Take 10 mg by mouth in the morning. • cyanocobalamin (VITAMIN B-12) 100 MCG tablet Take 100 mcg by mouth daily     • diphenhydrAMINE (BENADRYL) 2 % cream Apply topically 3 (three) times a day as needed for itching or allergies 15 g 0   • drospirenone-ethinyl estradiol (Vestura) 3-0.02 MG per tablet Take 1 tablet by mouth daily     • multivitamin (THERAGRAN) TABS Take 1 tablet by mouth daily     • triamcinolone (KENALOG) 0.1 % cream Apply topically 2 (two) times a day 80 g 0   • drospirenone-ethinyl estradiol (Gianvi) 3-0.02 MG per tablet Take 1 tablet by mouth daily (Patient not taking: Reported on 8/20/2023) 90 tablet 0     No current facility-administered medications for this visit. Current Outpatient Medications on File Prior to Visit   Medication Sig   • cetirizine (ZyrTEC) 10 mg tablet Take 10 mg by mouth in the morning. • cyanocobalamin (VITAMIN B-12) 100 MCG tablet Take 100 mcg by mouth daily   • diphenhydrAMINE (BENADRYL) 2 % cream Apply topically 3 (three) times a day as needed for itching or allergies   • drospirenone-ethinyl estradiol (Vestura) 3-0.02 MG per tablet Take 1 tablet by mouth daily   • multivitamin (THERAGRAN) TABS Take 1 tablet by mouth daily   • drospirenone-ethinyl estradiol (Gianvi) 3-0.02 MG per tablet Take 1 tablet by mouth daily (Patient not taking: Reported on 8/20/2023)     No current facility-administered medications on file prior to visit.      There are no discontinued medications. She is allergic to pollen extract. .    Review of Systems   Constitutional: Negative for appetite change, chills, fatigue and fever. HENT: Negative for sore throat and trouble swallowing. Eyes: Negative for redness. Respiratory: Negative for shortness of breath. Cardiovascular: Negative for chest pain and palpitations. Gastrointestinal: Negative for abdominal pain, constipation and diarrhea. Genitourinary: Negative for dysuria and hematuria. Musculoskeletal: Negative for back pain and neck pain. Skin: Positive for rash. Neurological: Negative for seizures, weakness and headaches. Hematological: Negative for adenopathy. Psychiatric/Behavioral: Negative for confusion. The patient is not nervous/anxious. Objective:      /60 (BP Location: Left arm, Patient Position: Sitting, Cuff Size: Standard)   Pulse 82   Temp 98.7 °F (37.1 °C) (Temporal)   Ht 5' 0.5" (1.537 m)   Wt 62.4 kg (137 lb 9.6 oz)   LMP 07/25/2023 (Approximate)   SpO2 99%   BMI 26.43 kg/m²     Results Reviewed     None          No results found for this or any previous visit (from the past 1344 hour(s)). Physical Exam  Constitutional:       General: She is not in acute distress. Appearance: Normal appearance. HENT:      Head: Normocephalic and atraumatic. Nose: Nose normal.      Mouth/Throat:      Mouth: Mucous membranes are moist.   Eyes:      Extraocular Movements: Extraocular movements intact. Pupils: Pupils are equal, round, and reactive to light. Cardiovascular:      Rate and Rhythm: Normal rate and regular rhythm. Pulses: Normal pulses. Heart sounds: Normal heart sounds. No murmur heard. No friction rub. Pulmonary:      Effort: Pulmonary effort is normal. No respiratory distress. Breath sounds: Normal breath sounds. No wheezing. Abdominal:      General: Abdomen is flat. Bowel sounds are normal. There is no distension.       Palpations: Abdomen is soft. There is no mass. Tenderness: There is no abdominal tenderness. There is no guarding. Musculoskeletal:         General: Normal range of motion. Cervical back: Normal range of motion. Skin:     Findings: Rash present. Neurological:      General: No focal deficit present. Mental Status: She is alert and oriented to person, place, and time. Mental status is at baseline. Cranial Nerves: No cranial nerve deficit.    Psychiatric:         Mood and Affect: Mood normal.         Behavior: Behavior normal.

## 2023-09-07 LAB — B BURGDOR AB SER IA-ACNC: <0.9 INDEX

## 2023-09-08 ENCOUNTER — TELEPHONE (OUTPATIENT)
Dept: INTERNAL MEDICINE CLINIC | Facility: CLINIC | Age: 43
End: 2023-09-08

## 2023-09-08 NOTE — TELEPHONE ENCOUNTER
Patient had a visit with you on 9/6. She is still experiencing itchiness on her rash, even with using the ointment. She would like to know what else she can do.      # [phone]

## 2023-10-13 DIAGNOSIS — Z30.41 SURVEILLANCE OF CONTRACEPTIVE PILL: ICD-10-CM

## 2023-10-13 RX ORDER — DROSPIRENONE AND ETHINYL ESTRADIOL 0.02-3(28)
1 KIT ORAL DAILY
Qty: 84 TABLET | Refills: 2 | Status: SHIPPED | OUTPATIENT
Start: 2023-10-13

## 2023-12-22 ENCOUNTER — OFFICE VISIT (OUTPATIENT)
Dept: INTERNAL MEDICINE CLINIC | Facility: CLINIC | Age: 43
End: 2023-12-22
Payer: COMMERCIAL

## 2023-12-22 VITALS
OXYGEN SATURATION: 99 % | HEART RATE: 78 BPM | BODY MASS INDEX: 27.09 KG/M2 | SYSTOLIC BLOOD PRESSURE: 126 MMHG | WEIGHT: 138 LBS | HEIGHT: 60 IN | TEMPERATURE: 98.1 F | DIASTOLIC BLOOD PRESSURE: 72 MMHG

## 2023-12-22 DIAGNOSIS — Z00.00 ANNUAL PHYSICAL EXAM: Primary | ICD-10-CM

## 2023-12-22 DIAGNOSIS — L72.9 CYST OF SUBCUTANEOUS TISSUE: ICD-10-CM

## 2023-12-22 PROCEDURE — 99396 PREV VISIT EST AGE 40-64: CPT | Performed by: INTERNAL MEDICINE

## 2023-12-22 NOTE — PROGRESS NOTES
ADULT ANNUAL PHYSICAL  [facility] INTERNAL MEDICINE    NAME: Matt Sherwood  AGE: 43 y.o. SEX: female  : 1980     DATE: 2023     Assessment and Plan:     Problem List Items Addressed This Visit    None  Visit Diagnoses       Annual physical exam    -  Primary    Relevant Orders    CBC and differential    Comprehensive metabolic panel    Lipid panel    Urinalysis with microscopic    TSH, 3rd generation    Cyst of subcutaneous tissue        Supraumbilical midline small cyst present.              Immunizations and preventive care screenings were discussed with patient today. Appropriate education was printed on patient's after visit summary.    Counseling:  Exercise: the importance of regular exercise/physical activity was discussed. Recommend exercise 3-5 times per week for at least 30 minutes.       Depression Screening and Follow-up Plan: Patient was screened for depression during today's encounter. They screened negative with a PHQ-2 score of 0.        Return in about 1 year (around 2024).     Chief Complaint:     Chief Complaint   Patient presents with    Physical Exam    reminded of mammo due in FEB    Nasal Congestion      History of Present Illness:     Adult Annual Physical   Patient here for a comprehensive physical exam. The patient reports problems - cold .    Diet and Physical Activity  Diet/Nutrition: well balanced diet and consuming 3-5 servings of fruits/vegetables daily.   Exercise: moderate cardiovascular exercise and 3-4 times a week on average.      Depression Screening  PHQ-2/9 Depression Screening    Little interest or pleasure in doing things: 0 - not at all  Feeling down, depressed, or hopeless: 0 - not at all  PHQ-2 Score: 0  PHQ-2 Interpretation: Negative depression screen       General Health  Sleep: sleeps well and gets 4-6 hours of sleep on average.   Hearing: normal - bilateral.  Vision: no vision problems, goes for regular  eye exams, most recent eye exam <1 year ago, and wears contacts.   Dental: regular dental visits and brushes teeth twice daily.       /GYN Health  Follows with gynecology? yes   Patient is: premenopausal  Last menstrual period: Dec 15  Contraceptive method: oral contraceptives.    Advanced Care Planning  Do you have an advanced directive? yes  Do you have a durable medical power of ? yes     Review of Systems:     Review of Systems   Constitutional:  Negative for appetite change, chills, fatigue and fever.   HENT:  Negative for sore throat and trouble swallowing.    Eyes:  Negative for redness.   Respiratory:  Negative for shortness of breath.    Cardiovascular:  Negative for chest pain and palpitations.   Gastrointestinal:  Negative for abdominal pain, constipation and diarrhea.   Genitourinary:  Negative for dysuria and hematuria.   Musculoskeletal:  Negative for back pain and neck pain.   Skin:  Negative for rash.   Neurological:  Negative for seizures, weakness and headaches.   Hematological:  Negative for adenopathy.   Psychiatric/Behavioral:  Negative for confusion. The patient is not nervous/anxious.       Past Medical History:     Past Medical History:   Diagnosis Date    Abnormal Pap smear of cervix     with atypical squamous cells of undetermined sing (ASC-US)    Allergic rhinitis      delivery delivered 2016    Genital warts     HPV in female     Mild cervical dysplasia     Moderate dysplasia of cervix (ALISHA II)     Seasonal allergies     Subserosal leiomyoma of uterus     resolved     Varicella     had as child    Varicose veins of vulva and perineum 2018    Visual impairment     eyewear      Past Surgical History:     Past Surgical History:   Procedure Laterality Date    BREAST BIOPSY Right 2022    neg stereo    CERVICAL BIOPSY  W/ LOOP ELECTRODE EXCISION       SECTION      COLPOSCOPY      HIATAL HERNIA REPAIR      MAMMO STEREOTACTIC BREAST BIOPSY RIGHT (ALL  INC) Right 2022    MYOMECTOMY      resolved 2016    [place]  DELIVERY ONLY N/A 2016 daughter    [place]  DELIVERY ONLY N/A 2018    Procedure:  SECTION () REPEAT;  Surgeon: Flip Larios MD;  Location: [facility];  Service: Obstetrics    VENTRAL HERNIA REPAIR      WISDOM TOOTH EXTRACTION        Social History:     Social History     Socioeconomic History    Marital status: /Civil Union     Spouse name: None    Number of children: None    Years of education: None    Highest education level: None   Occupational History    None   Tobacco Use    Smoking status: Never     Passive exposure: Past    Smokeless tobacco: Never   Vaping Use    Vaping status: Never Used   Substance and Sexual Activity    Alcohol use: Yes    Drug use: No    Sexual activity: Yes     Partners: Male     Birth control/protection: OCP   Other Topics Concern    None   Social History Narrative    Almost always uses seat belt    Daily coffee consumption ( 1 cups/day)    Exercising regularly        Alcohol: Socially    As per [employer]     Social Determinants of Health     Financial Resource Strain: Not on file   Food Insecurity: Not on file   Transportation Needs: Not on file   Physical Activity: Not on file   Stress: Not on file   Social Connections: Not on file   Intimate Partner Violence: Not on file   Housing Stability: Not on file      Family History:     Family History   Problem Relation Age of Onset    Cancer Mother         skin melanoma    Cancer Father         Brain    Other Father         Glomus Jugulare Tumor    Cancer Paternal Aunt         breast as per allscripts    Breast cancer Paternal Aunt 58    Breast cancer additional onset Paternal Aunt     Diabetes Maternal Grandmother     Heart disease Maternal Grandmother         triple bypass surgery    Mental retardation Cousin     Alzheimer's disease Paternal Grandfather     Cancer Paternal Grandfather         colon    Colon cancer  Paternal Grandfather     No Known Problems Daughter     No Known Problems Son     Esophageal cancer Maternal Grandfather 90    No Known Problems Paternal Grandmother     No Known Problems Sister     No Known Problems Sister     No Known Problems Maternal Aunt     No Known Problems Maternal Aunt     No Known Problems Maternal Uncle     No Known Problems Paternal Uncle     Endometrial cancer Neg Hx     Ovarian cancer Neg Hx       Current Medications:     Current Outpatient Medications   Medication Sig Dispense Refill    cetirizine (ZyrTEC) 10 mg tablet Take 10 mg by mouth in the morning.      cyanocobalamin (VITAMIN B-12) 100 MCG tablet Take 100 mcg by mouth daily      drospirenone-ethinyl estradiol (Vestura) 3-0.02 MG per tablet TAKE 1 TABLET BY MOUTH EVERY DAY 84 tablet 2    multivitamin (THERAGRAN) TABS Take 1 tablet by mouth daily      diphenhydrAMINE (BENADRYL) 2 % cream Apply topically 3 (three) times a day as needed for itching or allergies (Patient not taking: Reported on 12/22/2023) 15 g 0    drospirenone-ethinyl estradiol (Vestura) 3-0.02 MG per tablet Take 1 tablet by mouth daily      triamcinolone (KENALOG) 0.1 % cream Apply topically 2 (two) times a day (Patient not taking: Reported on 12/22/2023) 80 g 0     No current facility-administered medications for this visit.      Allergies:     Allergies   Allergen Reactions    Pollen Extract Allergic Rhinitis      Physical Exam:     /72 (BP Location: Left arm, Patient Position: Sitting, Cuff Size: Standard)   Pulse 78   Temp 98.1 °F (36.7 °C) (Temporal)   Ht 5' (1.524 m)   Wt 62.6 kg (138 lb)   SpO2 99%   BMI 26.95 kg/m²     Physical Exam  Vitals and nursing note reviewed.   Constitutional:       General: She is not in acute distress.     Appearance: She is well-developed.   HENT:      Head: Normocephalic and atraumatic.      Right Ear: Tympanic membrane normal.      Left Ear: Tympanic membrane normal.      Mouth/Throat:      Mouth: Mucous membranes  are moist.   Eyes:      Conjunctiva/sclera: Conjunctivae normal.   Cardiovascular:      Rate and Rhythm: Normal rate and regular rhythm.      Heart sounds: No murmur heard.  Pulmonary:      Effort: Pulmonary effort is normal. No respiratory distress.      Breath sounds: Normal breath sounds.   Abdominal:      General: Abdomen is flat.      Palpations: Abdomen is soft.      Tenderness: There is no abdominal tenderness.   Musculoskeletal:         General: No swelling.      Cervical back: Normal range of motion and neck supple.   Skin:     General: Skin is warm and dry.      Capillary Refill: Capillary refill takes less than 2 seconds.   Neurological:      General: No focal deficit present.      Mental Status: She is alert.   Psychiatric:         Mood and Affect: Mood normal.          Reid Nunez MD  [facility] INTERNAL MEDICINE

## 2023-12-29 LAB
ALBUMIN SERPL-MCNC: 3.9 G/DL (ref 3.6–5.1)
ALBUMIN/GLOB SERPL: 1.6 (CALC) (ref 1–2.5)
ALP SERPL-CCNC: 98 U/L (ref 31–125)
ALT SERPL-CCNC: 16 U/L (ref 6–29)
APPEARANCE UR: CLEAR
AST SERPL-CCNC: 16 U/L (ref 10–30)
BACTERIA UR QL AUTO: ABNORMAL /HPF
BASOPHILS # BLD AUTO: 31 CELLS/UL (ref 0–200)
BASOPHILS NFR BLD AUTO: 0.5 %
BILIRUB SERPL-MCNC: 0.3 MG/DL (ref 0.2–1.2)
BILIRUB UR QL STRIP: NEGATIVE
BUN SERPL-MCNC: 12 MG/DL (ref 7–25)
BUN/CREAT SERPL: NORMAL (CALC) (ref 6–22)
CALCIUM SERPL-MCNC: 8.9 MG/DL (ref 8.6–10.2)
CHLORIDE SERPL-SCNC: 102 MMOL/L (ref 98–110)
CHOLEST SERPL-MCNC: 222 MG/DL
CHOLEST/HDLC SERPL: 3.3 (CALC)
CO2 SERPL-SCNC: 23 MMOL/L (ref 20–32)
COLOR UR: YELLOW
CREAT SERPL-MCNC: 0.63 MG/DL (ref 0.5–0.99)
EOSINOPHIL # BLD AUTO: 79 CELLS/UL (ref 15–500)
EOSINOPHIL NFR BLD AUTO: 1.3 %
ERYTHROCYTE [DISTWIDTH] IN BLOOD BY AUTOMATED COUNT: 12.6 % (ref 11–15)
GFR/BSA.PRED SERPLBLD CYS-BASED-ARV: 114 ML/MIN/1.73M2
GLOBULIN SER CALC-MCNC: 2.5 G/DL (CALC) (ref 1.9–3.7)
GLUCOSE SERPL-MCNC: 84 MG/DL (ref 65–99)
GLUCOSE UR QL STRIP: NEGATIVE
HCT VFR BLD AUTO: 35.2 % (ref 35–45)
HDLC SERPL-MCNC: 67 MG/DL
HGB BLD-MCNC: 11.7 G/DL (ref 11.7–15.5)
HGB UR QL STRIP: NEGATIVE
HYALINE CASTS #/AREA URNS LPF: ABNORMAL /LPF
KETONES UR QL STRIP: NEGATIVE
LDLC SERPL CALC-MCNC: 121 MG/DL (CALC)
LEUKOCYTE ESTERASE UR QL STRIP: NEGATIVE
LYMPHOCYTES # BLD AUTO: 2373 CELLS/UL (ref 850–3900)
LYMPHOCYTES NFR BLD AUTO: 38.9 %
MCH RBC QN AUTO: 29 PG (ref 27–33)
MCHC RBC AUTO-ENTMCNC: 33.2 G/DL (ref 32–36)
MCV RBC AUTO: 87.1 FL (ref 80–100)
MONOCYTES # BLD AUTO: 451 CELLS/UL (ref 200–950)
MONOCYTES NFR BLD AUTO: 7.4 %
NEUTROPHILS # BLD AUTO: 3166 CELLS/UL (ref 1500–7800)
NEUTROPHILS NFR BLD AUTO: 51.9 %
NITRITE UR QL STRIP: NEGATIVE
NONHDLC SERPL-MCNC: 155 MG/DL (CALC)
PH UR STRIP: 6 [PH] (ref 5–8)
PLATELET # BLD AUTO: 343 THOUSAND/UL (ref 140–400)
PMV BLD REES-ECKER: 9.6 FL (ref 7.5–12.5)
POTASSIUM SERPL-SCNC: 4.3 MMOL/L (ref 3.5–5.3)
PROT SERPL-MCNC: 6.4 G/DL (ref 6.1–8.1)
PROT UR QL STRIP: NEGATIVE
RBC # BLD AUTO: 4.04 MILLION/UL (ref 3.8–5.1)
RBC #/AREA URNS HPF: ABNORMAL /HPF
SODIUM SERPL-SCNC: 135 MMOL/L (ref 135–146)
SP GR UR STRIP: 1.01 (ref 1–1.03)
SQUAMOUS #/AREA URNS HPF: ABNORMAL /HPF
TRIGL SERPL-MCNC: 219 MG/DL
TSH SERPL-ACNC: 1.87 MIU/L
WBC # BLD AUTO: 6.1 THOUSAND/UL (ref 3.8–10.8)
WBC #/AREA URNS HPF: ABNORMAL /HPF

## 2024-02-07 ENCOUNTER — OFFICE VISIT (OUTPATIENT)
Dept: INTERNAL MEDICINE CLINIC | Facility: CLINIC | Age: 44
End: 2024-02-07
Payer: COMMERCIAL

## 2024-02-07 VITALS
OXYGEN SATURATION: 98 % | TEMPERATURE: 99.8 F | WEIGHT: 138 LBS | DIASTOLIC BLOOD PRESSURE: 65 MMHG | SYSTOLIC BLOOD PRESSURE: 122 MMHG | BODY MASS INDEX: 27.09 KG/M2 | HEIGHT: 60 IN | HEART RATE: 98 BPM

## 2024-02-07 DIAGNOSIS — R05.9 COUGH IN ADULT: Primary | ICD-10-CM

## 2024-02-07 DIAGNOSIS — K21.9 GASTROESOPHAGEAL REFLUX DISEASE, UNSPECIFIED WHETHER ESOPHAGITIS PRESENT: ICD-10-CM

## 2024-02-07 LAB
SARS-COV-2 AG UPPER RESP QL IA: NEGATIVE
VALID CONTROL: NORMAL

## 2024-02-07 PROCEDURE — 99213 OFFICE O/P EST LOW 20 MIN: CPT | Performed by: INTERNAL MEDICINE

## 2024-02-07 PROCEDURE — 87811 SARS-COV-2 COVID19 W/OPTIC: CPT | Performed by: INTERNAL MEDICINE

## 2024-02-07 RX ORDER — ALBUTEROL SULFATE 90 UG/1
2 AEROSOL, METERED RESPIRATORY (INHALATION) EVERY 6 HOURS PRN
Qty: 18 G | Refills: 0 | Status: SHIPPED | OUTPATIENT
Start: 2024-02-07

## 2024-02-07 RX ORDER — PANTOPRAZOLE SODIUM 40 MG/1
40 TABLET, DELAYED RELEASE ORAL DAILY
Qty: 30 TABLET | Refills: 0 | Status: SHIPPED | OUTPATIENT
Start: 2024-02-07 | End: 2024-03-08

## 2024-02-07 RX ORDER — BENZONATATE 200 MG/1
200 CAPSULE ORAL 3 TIMES DAILY PRN
Qty: 20 CAPSULE | Refills: 0 | Status: SHIPPED | OUTPATIENT
Start: 2024-02-07

## 2024-02-07 NOTE — PROGRESS NOTES
Assessment/Plan:    Diagnoses and all orders for this visit:    Cough in adult  -     Poct Covid 19 Rapid Antigen Test  -     albuterol (ProAir HFA) 90 mcg/act inhaler; Inhale 2 puffs every 6 (six) hours as needed for wheezing  -     benzonatate (TESSALON) 200 MG capsule; Take 1 capsule (200 mg total) by mouth 3 (three) times a day as needed for cough    Gastroesophageal reflux disease, unspecified whether esophagitis present  -     pantoprazole (PROTONIX) 40 mg tablet; Take 1 tablet (40 mg total) by mouth daily       Patient reports ongoing congestion and cough for the last several weeks after recovering from upper respiratory infection intermediate December.  Denies any fever chills nausea vomiting diarrhea shortness of breath, or productive cough.  Office COVID test was negative OTC medications not helping    Symptoms are likely due to postviral cough syndrome, recommend plenty of fluids, continue Claritin, will empirically treat for GERD symptoms.  Patient reports cough episodes at night, will treat for airway reactivity.  Follow-up if symptoms are not improved           There are no Patient Instructions on file for this visit.    Subjective:      Patient ID: Matt Sherwood is a 43 y.o. female    HPI      Current Outpatient Medications:     albuterol (ProAir HFA) 90 mcg/act inhaler, Inhale 2 puffs every 6 (six) hours as needed for wheezing, Disp: 18 g, Rfl: 0    benzonatate (TESSALON) 200 MG capsule, Take 1 capsule (200 mg total) by mouth 3 (three) times a day as needed for cough, Disp: 20 capsule, Rfl: 0    cetirizine (ZyrTEC) 10 mg tablet, Take 10 mg by mouth in the morning., Disp: , Rfl:     cyanocobalamin (VITAMIN B-12) 100 MCG tablet, Take 100 mcg by mouth daily, Disp: , Rfl:     drospirenone-ethinyl estradiol (Vestura) 3-0.02 MG per tablet, TAKE 1 TABLET BY MOUTH EVERY DAY, Disp: 84 tablet, Rfl: 2    multivitamin (THERAGRAN) TABS, Take 1 tablet by mouth daily, Disp: , Rfl:     pantoprazole (PROTONIX) 40 mg  tablet, Take 1 tablet (40 mg total) by mouth daily, Disp: 30 tablet, Rfl: 0    diphenhydrAMINE (BENADRYL) 2 % cream, Apply topically 3 (three) times a day as needed for itching or allergies (Patient not taking: Reported on 2023), Disp: 15 g, Rfl: 0    drospirenone-ethinyl estradiol (Vestura) 3-0.02 MG per tablet, Take 1 tablet by mouth daily, Disp: , Rfl:     triamcinolone (KENALOG) 0.1 % cream, Apply topically 2 (two) times a day (Patient not taking: Reported on 2023), Disp: 80 g, Rfl: 0     Past Medical History:   Diagnosis Date    Abnormal Pap smear of cervix     with atypical squamous cells of undetermined sing (ASC-US)    Allergic rhinitis      delivery delivered 2016    Genital warts     HPV in female     Mild cervical dysplasia     Moderate dysplasia of cervix (ALISHA II)     Seasonal allergies     Subserosal leiomyoma of uterus     resolved     Varicella     had as child    Varicose veins of vulva and perineum 2018    Visual impairment     eyewear         Past Surgical History:   Procedure Laterality Date    BREAST BIOPSY Right 2022    neg stereo    CERVICAL BIOPSY  W/ LOOP ELECTRODE EXCISION       SECTION      COLPOSCOPY      HIATAL HERNIA REPAIR      MAMMO STEREOTACTIC BREAST BIOPSY RIGHT (ALL INC) Right 2022    MYOMECTOMY      resolved     [place]  DELIVERY ONLY N/A 2016    2016 daughter    [place]  DELIVERY ONLY N/A 2018    Procedure:  SECTION () REPEAT;  Surgeon: Flip Larios MD;  Location: [facility] ;  Service: Obstetrics    VENTRAL HERNIA REPAIR      WISDOM TOOTH EXTRACTION           Allergies   Allergen Reactions    Pollen Extract Allergic Rhinitis       Recent Results (from the past 1008 hour(s))   Poct Covid 19 Rapid Antigen Test    Collection Time: 24  4:09 PM   Result Value Ref Range    POCT SARS-CoV-2 Ag Negative Negative    VALID CONTROL Valid        The following portions of the patient's  history were reviewed and updated as appropriate: allergies, current medications, past family history, past medical history, past social history, past surgical history and problem list.     Review of Systems   Constitutional:  Negative for activity change, appetite change, chills, diaphoresis, fatigue, fever and unexpected weight change.   HENT:  Positive for congestion. Negative for drooling, ear discharge, ear pain, facial swelling, hearing loss, postnasal drip, rhinorrhea, sinus pressure, sinus pain, sneezing, sore throat, tinnitus, trouble swallowing and voice change.    Eyes:  Negative for discharge.   Respiratory:  Positive for cough. Negative for apnea, choking, chest tightness, shortness of breath, wheezing and stridor.    Cardiovascular:  Negative for chest pain, palpitations and leg swelling.   Gastrointestinal:  Negative for abdominal distention, abdominal pain, anal bleeding, blood in stool, constipation, diarrhea, nausea and vomiting.   Genitourinary:  Negative for decreased urine volume, difficulty urinating, frequency and urgency.   Musculoskeletal:  Negative for arthralgias, back pain and myalgias.   Skin:  Negative for color change.   Neurological:  Negative for dizziness, light-headedness, numbness and headaches.         Objective:      Vitals:    02/07/24 1543   BP: 122/65   Pulse: 98   Temp: 99.8 °F (37.7 °C)   SpO2: 98%          Physical Exam  Vitals reviewed.   Constitutional:       General: She is not in acute distress.     Appearance: Normal appearance. She is not ill-appearing, toxic-appearing or diaphoretic.   HENT:      Right Ear: Tympanic membrane normal. No middle ear effusion.      Left Ear: A middle ear effusion is present.      Nose: Congestion present. No rhinorrhea.      Right Turbinates: Not enlarged, swollen or pale.      Left Turbinates: Not enlarged, swollen or pale.      Mouth/Throat:      Mouth: Mucous membranes are moist.      Pharynx: No oropharyngeal exudate or posterior  oropharyngeal erythema.   Cardiovascular:      Rate and Rhythm: Normal rate and regular rhythm.      Pulses: Normal pulses.      Heart sounds: Normal heart sounds. No murmur heard.     No friction rub. No gallop.   Pulmonary:      Effort: Pulmonary effort is normal. No respiratory distress.      Breath sounds: Normal breath sounds. No stridor. No wheezing, rhonchi or rales.   Chest:      Chest wall: No tenderness.   Musculoskeletal:      Right lower leg: No edema.      Left lower leg: No edema.   Skin:     General: Skin is warm and dry.      Findings: No lesion or rash.   Neurological:      Mental Status: She is alert.

## 2024-02-15 ENCOUNTER — HOSPITAL ENCOUNTER (OUTPATIENT)
Dept: RADIOLOGY | Facility: IMAGING CENTER | Age: 44
End: 2024-02-15
Payer: COMMERCIAL

## 2024-02-15 VITALS — BODY MASS INDEX: 27.09 KG/M2 | HEIGHT: 60 IN | WEIGHT: 138 LBS

## 2024-02-15 DIAGNOSIS — Z12.31 ENCOUNTER FOR SCREENING MAMMOGRAM FOR MALIGNANT NEOPLASM OF BREAST: ICD-10-CM

## 2024-02-15 PROCEDURE — 77063 BREAST TOMOSYNTHESIS BI: CPT

## 2024-02-15 PROCEDURE — 77067 SCR MAMMO BI INCL CAD: CPT

## 2024-04-22 ENCOUNTER — TELEPHONE (OUTPATIENT)
Dept: INTERNAL MEDICINE CLINIC | Facility: CLINIC | Age: 44
End: 2024-04-22

## 2024-04-22 NOTE — TELEPHONE ENCOUNTER
----- Message from Reid Nunez MD sent at 4/22/2024  5:07 PM EDT -----  Regarding: FW: Gastroenterologist referral  Contact: [phone]  Please make an appointment with me for this week or early next week.  ----- Message -----  From: Marsha Hoskins  Sent: 4/17/2024   9:51 AM EDT  To: Reid Nunez MD  Subject: FW: Gastroenterologist referral                    ----- Message -----  From: Matt Sherwood  Sent: 4/17/2024   8:24 AM EDT  To: [facility]  Subject: Gastroenterologist referral                      Hi Dr. Nunez,     I'd like to make an appointment with a gastroenterologist for my reflux and bloating. My abdomen is rigid and I'm uncomfortable after eating. I have been eating more protein as of late but the gas and bloating is worse.     Thank you!  Matt Sherwood

## 2024-05-03 ENCOUNTER — OFFICE VISIT (OUTPATIENT)
Dept: INTERNAL MEDICINE CLINIC | Facility: CLINIC | Age: 44
End: 2024-05-03
Payer: COMMERCIAL

## 2024-05-03 VITALS
DIASTOLIC BLOOD PRESSURE: 80 MMHG | HEIGHT: 60 IN | OXYGEN SATURATION: 99 % | WEIGHT: 140 LBS | SYSTOLIC BLOOD PRESSURE: 120 MMHG | HEART RATE: 63 BPM | TEMPERATURE: 97 F | BODY MASS INDEX: 27.48 KG/M2

## 2024-05-03 DIAGNOSIS — R19.7 DIARRHEA, UNSPECIFIED TYPE: ICD-10-CM

## 2024-05-03 DIAGNOSIS — K90.89 OTHER SPECIFIED INTESTINAL MALABSORPTION: ICD-10-CM

## 2024-05-03 DIAGNOSIS — R14.0 BLOATING: Primary | ICD-10-CM

## 2024-05-03 PROCEDURE — 99215 OFFICE O/P EST HI 40 MIN: CPT | Performed by: INTERNAL MEDICINE

## 2024-05-03 NOTE — PROGRESS NOTES
Assessment/Plan:           1. Bloating  -     Stool Enteric Bacterial Panel by PCR; Future  -     Fecal fat, qualitative; Future  -     Fecal leukocytes; Future  -     Giardia antigen; Future  -     H. pylori antigen, stool; Future  -     CBC and differential; Future  -     Comprehensive metabolic panel; Future  -     TSH, 3rd generation; Future  -     T4, free; Future  -     Urinalysis with microscopic; Future  -     Vitamin B12; Future  -     Vitamin D 25 hydroxy; Future  -     Celiac Disease Panel; Future  -     C-reactive protein; Future  -     Sedimentation rate, automated; Future  -     Pancreatic elastase, fecal; Future  -     Lactoferrin, fecal, quantitative; Future    2. Other specified intestinal malabsorption  -     Stool Enteric Bacterial Panel by PCR; Future  -     Fecal fat, qualitative; Future  -     Fecal leukocytes; Future  -     Giardia antigen; Future  -     H. pylori antigen, stool; Future  -     CBC and differential; Future  -     Comprehensive metabolic panel; Future  -     TSH, 3rd generation; Future  -     T4, free; Future  -     Urinalysis with microscopic; Future  -     Vitamin B12; Future  -     Vitamin D 25 hydroxy; Future  -     Celiac Disease Panel; Future  -     C-reactive protein; Future  -     Sedimentation rate, automated; Future  -     Pancreatic elastase, fecal; Future  -     Lactoferrin, fecal, quantitative; Future    3. Diarrhea, unspecified type  -     Stool Enteric Bacterial Panel by PCR; Future  -     Fecal fat, qualitative; Future  -     Fecal leukocytes; Future  -     Giardia antigen; Future  -     H. pylori antigen, stool; Future  -     CBC and differential; Future  -     Comprehensive metabolic panel; Future  -     TSH, 3rd generation; Future  -     T4, free; Future  -     Urinalysis with microscopic; Future  -     Vitamin B12; Future  -     Vitamin D 25 hydroxy; Future  -     Celiac Disease Panel; Future  -     C-reactive protein; Future  -     Sedimentation rate,  automated; Future  -     Pancreatic elastase, fecal; Future  -     Lactoferrin, fecal, quantitative; Future           There are no diagnoses linked to this encounter.     No problem-specific Assessment & Plan notes found for this encounter.           Subjective:      Patient ID: Matt Sherwood is a 43 y.o. female.    Is a 43-year-old lady who complains of persistent bloating.  Her dietary habits discussed in detail.  Trial of gluten-free diet for 2 weeks was advised.        The following portions of the patient's history were reviewed and updated as appropriate: She  has a past medical history of Abnormal Pap smear of cervix, Allergic rhinitis,  delivery delivered (2016), Genital warts, HPV in female, Mild cervical dysplasia, Moderate dysplasia of cervix (ALISHA II), Seasonal allergies, Subserosal leiomyoma of uterus, Varicella, Varicose veins of vulva and perineum (2018), and Visual impairment.  She There are no problems to display for this patient.    She  has a past surgical history that includes Cervical biopsy w/ loop electrode excision; Colposcopy; [place] tooth extraction; pr  delivery only (N/A, 2016); Ventral hernia repair; Myomectomy;  section; pr  delivery only (N/A, 2018); Hiatal hernia repair; Mammo stereotactic breast biopsy right (all inc) (Right, 2022); and Breast biopsy (Right, 2022).  Her family history includes Alzheimer's disease in her paternal grandfather; Breast cancer (age of onset: 58) in her paternal aunt; Breast cancer additional onset in her paternal aunt; Cancer in her father, mother, paternal aunt, and paternal grandfather; Colon cancer in her paternal grandfather; Diabetes in her maternal grandmother; Esophageal cancer (age of onset: 90) in her maternal grandfather; Heart disease in her maternal grandmother; Mental retardation in her cousin; No Known Problems in her daughter, maternal aunt, maternal aunt, maternal uncle,  paternal grandmother, paternal uncle, sister, sister, and son; Other in her father.  She  reports that she has never smoked. She has been exposed to tobacco smoke. She has never used smokeless tobacco. She reports current alcohol use. She reports that she does not use drugs.  Current Outpatient Medications   Medication Sig Dispense Refill    cetirizine (ZyrTEC) 10 mg tablet Take 10 mg by mouth in the morning.      cyanocobalamin (VITAMIN B-12) 100 MCG tablet Take 100 mcg by mouth daily      drospirenone-ethinyl estradiol (Vestura) 3-0.02 MG per tablet Take 1 tablet by mouth daily      multivitamin (THERAGRAN) TABS Take 1 tablet by mouth daily      pantoprazole (PROTONIX) 40 mg tablet Take 1 tablet (40 mg total) by mouth daily (Patient taking differently: Take 40 mg by mouth if needed) 30 tablet 0    albuterol (ProAir HFA) 90 mcg/act inhaler Inhale 2 puffs every 6 (six) hours as needed for wheezing (Patient not taking: Reported on 5/3/2024) 18 g 0    benzonatate (TESSALON) 200 MG capsule Take 1 capsule (200 mg total) by mouth 3 (three) times a day as needed for cough (Patient not taking: Reported on 5/3/2024) 20 capsule 0    diphenhydrAMINE (BENADRYL) 2 % cream Apply topically 3 (three) times a day as needed for itching or allergies (Patient not taking: Reported on 12/22/2023) 15 g 0    drospirenone-ethinyl estradiol (Vestura) 3-0.02 MG per tablet TAKE 1 TABLET BY MOUTH EVERY DAY (Patient not taking: Reported on 5/3/2024) 84 tablet 2    triamcinolone (KENALOG) 0.1 % cream Apply topically 2 (two) times a day (Patient not taking: Reported on 12/22/2023) 80 g 0     No current facility-administered medications for this visit.     Current Outpatient Medications on File Prior to Visit   Medication Sig    cetirizine (ZyrTEC) 10 mg tablet Take 10 mg by mouth in the morning.    cyanocobalamin (VITAMIN B-12) 100 MCG tablet Take 100 mcg by mouth daily    drospirenone-ethinyl estradiol (Vestura) 3-0.02 MG per tablet Take 1  tablet by mouth daily    multivitamin (THERAGRAN) TABS Take 1 tablet by mouth daily    pantoprazole (PROTONIX) 40 mg tablet Take 1 tablet (40 mg total) by mouth daily (Patient taking differently: Take 40 mg by mouth if needed)    albuterol (ProAir HFA) 90 mcg/act inhaler Inhale 2 puffs every 6 (six) hours as needed for wheezing (Patient not taking: Reported on 5/3/2024)    benzonatate (TESSALON) 200 MG capsule Take 1 capsule (200 mg total) by mouth 3 (three) times a day as needed for cough (Patient not taking: Reported on 5/3/2024)    diphenhydrAMINE (BENADRYL) 2 % cream Apply topically 3 (three) times a day as needed for itching or allergies (Patient not taking: Reported on 12/22/2023)    drospirenone-ethinyl estradiol (Vestura) 3-0.02 MG per tablet TAKE 1 TABLET BY MOUTH EVERY DAY (Patient not taking: Reported on 5/3/2024)    triamcinolone (KENALOG) 0.1 % cream Apply topically 2 (two) times a day (Patient not taking: Reported on 12/22/2023)     No current facility-administered medications on file prior to visit.     There are no discontinued medications.   She is allergic to pollen extract..    Review of Systems   Constitutional:  Negative for appetite change, chills, fatigue and fever.   HENT:  Negative for sore throat and trouble swallowing.    Eyes:  Negative for redness.   Respiratory:  Negative for shortness of breath.    Cardiovascular:  Negative for chest pain and palpitations.   Gastrointestinal:  Positive for abdominal distention. Negative for abdominal pain, blood in stool, constipation and diarrhea.   Genitourinary:  Negative for dysuria and hematuria.   Musculoskeletal:  Negative for back pain and neck pain.   Skin:  Negative for rash.   Neurological:  Negative for seizures, weakness and headaches.   Hematological:  Negative for adenopathy.   Psychiatric/Behavioral:  Negative for confusion. The patient is not nervous/anxious.          Objective:      /80 (BP Location: Left arm, Patient Position:  Sitting, Cuff Size: Standard)   Pulse 63   Temp (!) 97 °F (36.1 °C) (Temporal)   Ht 5' (1.524 m)   Wt 63.5 kg (140 lb)   SpO2 99%   BMI 27.34 kg/m²     Results Reviewed       None            No results found for this or any previous visit (from the past 1344 hour(s)).     Physical Exam  Constitutional:       General: She is not in acute distress.     Appearance: Normal appearance.   HENT:      Head: Normocephalic and atraumatic.      Nose: Nose normal.      Mouth/Throat:      Mouth: Mucous membranes are moist.   Eyes:      Extraocular Movements: Extraocular movements intact.      Pupils: Pupils are equal, round, and reactive to light.   Cardiovascular:      Rate and Rhythm: Normal rate and regular rhythm.      Pulses: Normal pulses.      Heart sounds: Normal heart sounds. No murmur heard.     No friction rub.   Pulmonary:      Effort: Pulmonary effort is normal. No respiratory distress.      Breath sounds: Normal breath sounds. No wheezing.   Abdominal:      General: Abdomen is flat. Bowel sounds are normal. There is no distension.      Palpations: Abdomen is soft. There is no mass.      Tenderness: There is no abdominal tenderness. There is no guarding.   Musculoskeletal:         General: Normal range of motion.      Cervical back: Normal range of motion.   Skin:     General: Skin is warm.   Neurological:      General: No focal deficit present.      Mental Status: She is alert and oriented to person, place, and time. Mental status is at baseline.      Cranial Nerves: No cranial nerve deficit.   Psychiatric:         Mood and Affect: Mood normal.         Behavior: Behavior normal.

## 2024-05-10 LAB
25(OH)D3 SERPL-MCNC: 26 NG/ML (ref 30–100)
ALBUMIN SERPL-MCNC: 4.1 G/DL (ref 3.6–5.1)
ALBUMIN/GLOB SERPL: 1.8 (CALC) (ref 1–2.5)
ALP SERPL-CCNC: 96 U/L (ref 31–125)
ALT SERPL-CCNC: 27 U/L (ref 6–29)
AST SERPL-CCNC: 22 U/L (ref 10–30)
BACTERIA UR QL AUTO: NORMAL /HPF
BASOPHILS # BLD AUTO: 32 CELLS/UL (ref 0–200)
BASOPHILS NFR BLD AUTO: 0.4 %
BILIRUB SERPL-MCNC: 0.4 MG/DL (ref 0.2–1.2)
BUN SERPL-MCNC: 13 MG/DL (ref 7–25)
BUN/CREAT SERPL: ABNORMAL (CALC) (ref 6–22)
CALCIUM SERPL-MCNC: 9.1 MG/DL (ref 8.6–10.2)
CHLORIDE SERPL-SCNC: 99 MMOL/L (ref 98–110)
CO2 SERPL-SCNC: 24 MMOL/L (ref 20–32)
CREAT SERPL-MCNC: 0.56 MG/DL (ref 0.5–0.99)
CRP SERPL-MCNC: 10.9 MG/L
EOSINOPHIL # BLD AUTO: 73 CELLS/UL (ref 15–500)
EOSINOPHIL NFR BLD AUTO: 0.9 %
ERYTHROCYTE [DISTWIDTH] IN BLOOD BY AUTOMATED COUNT: 13 % (ref 11–15)
ERYTHROCYTE [SEDIMENTATION RATE] IN BLOOD BY WESTERGREN METHOD: 9 MM/H
GFR/BSA.PRED SERPLBLD CYS-BASED-ARV: 118 ML/MIN/1.73M2
GLOBULIN SER CALC-MCNC: 2.3 G/DL (CALC) (ref 1.9–3.7)
GLUCOSE SERPL-MCNC: 92 MG/DL (ref 65–99)
HCT VFR BLD AUTO: 34.8 % (ref 35–45)
HGB BLD-MCNC: 11.7 G/DL (ref 11.7–15.5)
HYALINE CASTS #/AREA URNS LPF: NORMAL /LPF
IGA SERPL-MCNC: 82 MG/DL (ref 47–310)
LYMPHOCYTES # BLD AUTO: 2430 CELLS/UL (ref 850–3900)
LYMPHOCYTES NFR BLD AUTO: 30 %
MCH RBC QN AUTO: 29.1 PG (ref 27–33)
MCHC RBC AUTO-ENTMCNC: 33.6 G/DL (ref 32–36)
MCV RBC AUTO: 86.6 FL (ref 80–100)
MONOCYTES # BLD AUTO: 535 CELLS/UL (ref 200–950)
MONOCYTES NFR BLD AUTO: 6.6 %
NEUTROPHILS # BLD AUTO: 5030 CELLS/UL (ref 1500–7800)
NEUTROPHILS NFR BLD AUTO: 62.1 %
PLATELET # BLD AUTO: 315 THOUSAND/UL (ref 140–400)
PMV BLD REES-ECKER: 9.8 FL (ref 7.5–12.5)
POTASSIUM SERPL-SCNC: 4.1 MMOL/L (ref 3.5–5.3)
PROT SERPL-MCNC: 6.4 G/DL (ref 6.1–8.1)
RBC # BLD AUTO: 4.02 MILLION/UL (ref 3.8–5.1)
RBC #/AREA URNS HPF: NORMAL /HPF
SODIUM SERPL-SCNC: 132 MMOL/L (ref 135–146)
SQUAMOUS #/AREA URNS HPF: NORMAL /HPF
T4 FREE SERPL-MCNC: 1 NG/DL (ref 0.8–1.8)
TSH SERPL-ACNC: 1.93 MIU/L
TTG IGA SER-ACNC: <1 U/ML
VIT B12 SERPL-MCNC: 694 PG/ML (ref 200–1100)
WBC # BLD AUTO: 8.1 THOUSAND/UL (ref 3.8–10.8)
WBC #/AREA URNS HPF: NORMAL /HPF

## 2024-05-15 LAB
ELASTASE PANC STL-MCNT: >500 MCG/G
LACTOFERRIN STL-MCNC: <6.25 MCG/ML

## 2024-05-17 ENCOUNTER — OFFICE VISIT (OUTPATIENT)
Dept: INTERNAL MEDICINE CLINIC | Facility: CLINIC | Age: 44
End: 2024-05-17
Payer: COMMERCIAL

## 2024-05-17 VITALS
DIASTOLIC BLOOD PRESSURE: 76 MMHG | OXYGEN SATURATION: 98 % | TEMPERATURE: 97.9 F | WEIGHT: 137 LBS | BODY MASS INDEX: 26.76 KG/M2 | HEART RATE: 87 BPM | SYSTOLIC BLOOD PRESSURE: 131 MMHG

## 2024-05-17 DIAGNOSIS — K90.89 OTHER SPECIFIED INTESTINAL MALABSORPTION: Primary | ICD-10-CM

## 2024-05-17 DIAGNOSIS — R14.0 BLOATING: ICD-10-CM

## 2024-05-17 PROCEDURE — 99214 OFFICE O/P EST MOD 30 MIN: CPT | Performed by: INTERNAL MEDICINE

## 2024-05-17 NOTE — PROGRESS NOTES
Assessment/Plan:           1. Other specified intestinal malabsorption  Comments:  Quantitative fecal fat was ordered and gastroenterology referral advised.  Orders:  -     Fecal fat, quantitative; Future  -     Ambulatory Referral to Gastroenterology; Future  2. Bloating  Comments:  Continue to stay off gluten and continue nonulcer diet.  Orders:  -     Ambulatory Referral to Gastroenterology; Future         1. Other specified intestinal malabsorption    - Fecal fat, quantitative; Future       No problem-specific Assessment & Plan notes found for this encounter.           Subjective:      Patient ID: Matt Sherwood is a 43 y.o. female.    HPI    The following portions of the patient's history were reviewed and updated as appropriate: She  has a past medical history of Abnormal Pap smear of cervix, Allergic rhinitis,  delivery delivered (2016), Genital warts, HPV in female, Mild cervical dysplasia, Moderate dysplasia of cervix (ALISHA II), Seasonal allergies, Subserosal leiomyoma of uterus, Varicella, Varicose veins of vulva and perineum (2018), and Visual impairment.  She There are no problems to display for this patient.    She  has a past surgical history that includes Cervical biopsy w/ loop electrode excision; Colposcopy; [place] tooth extraction; pr  delivery only (N/A, 2016); Ventral hernia repair; Myomectomy;  section; pr  delivery only (N/A, 2018); Hiatal hernia repair; Mammo stereotactic breast biopsy right (all inc) (Right, 2022); and Breast biopsy (Right, 2022).  Her family history includes Alzheimer's disease in her paternal grandfather; Breast cancer (age of onset: 58) in her paternal aunt; Breast cancer additional onset in her paternal aunt; Cancer in her father, mother, paternal aunt, and paternal grandfather; Colon cancer in her paternal grandfather; Diabetes in her maternal grandmother; Esophageal cancer (age of onset: 90) in her maternal  grandfather; Heart disease in her maternal grandmother; Mental retardation in her cousin; No Known Problems in her daughter, maternal aunt, maternal aunt, maternal uncle, paternal grandmother, paternal uncle, sister, sister, and son; Other in her father.  She  reports that she has never smoked. She has been exposed to tobacco smoke. She has never used smokeless tobacco. She reports current alcohol use. She reports that she does not use drugs.  Current Outpatient Medications   Medication Sig Dispense Refill    cetirizine (ZyrTEC) 10 mg tablet Take 10 mg by mouth in the morning.      cyanocobalamin (VITAMIN B-12) 100 MCG tablet Take 100 mcg by mouth daily      drospirenone-ethinyl estradiol (Vestura) 3-0.02 MG per tablet Take 1 tablet by mouth daily      multivitamin (THERAGRAN) TABS Take 1 tablet by mouth daily      pantoprazole (PROTONIX) 40 mg tablet Take 1 tablet (40 mg total) by mouth daily (Patient taking differently: Take 40 mg by mouth if needed) 30 tablet 0    albuterol (ProAir HFA) 90 mcg/act inhaler Inhale 2 puffs every 6 (six) hours as needed for wheezing (Patient not taking: Reported on 5/3/2024) 18 g 0    benzonatate (TESSALON) 200 MG capsule Take 1 capsule (200 mg total) by mouth 3 (three) times a day as needed for cough (Patient not taking: Reported on 5/3/2024) 20 capsule 0    diphenhydrAMINE (BENADRYL) 2 % cream Apply topically 3 (three) times a day as needed for itching or allergies (Patient not taking: Reported on 12/22/2023) 15 g 0    drospirenone-ethinyl estradiol (Vestura) 3-0.02 MG per tablet TAKE 1 TABLET BY MOUTH EVERY DAY (Patient not taking: Reported on 5/3/2024) 84 tablet 2    triamcinolone (KENALOG) 0.1 % cream Apply topically 2 (two) times a day (Patient not taking: Reported on 12/22/2023) 80 g 0     No current facility-administered medications for this visit.     Current Outpatient Medications on File Prior to Visit   Medication Sig    cetirizine (ZyrTEC) 10 mg tablet Take 10 mg by  mouth in the morning.    cyanocobalamin (VITAMIN B-12) 100 MCG tablet Take 100 mcg by mouth daily    drospirenone-ethinyl estradiol (Vestura) 3-0.02 MG per tablet Take 1 tablet by mouth daily    multivitamin (THERAGRAN) TABS Take 1 tablet by mouth daily    pantoprazole (PROTONIX) 40 mg tablet Take 1 tablet (40 mg total) by mouth daily (Patient taking differently: Take 40 mg by mouth if needed)    albuterol (ProAir HFA) 90 mcg/act inhaler Inhale 2 puffs every 6 (six) hours as needed for wheezing (Patient not taking: Reported on 5/3/2024)    benzonatate (TESSALON) 200 MG capsule Take 1 capsule (200 mg total) by mouth 3 (three) times a day as needed for cough (Patient not taking: Reported on 5/3/2024)    diphenhydrAMINE (BENADRYL) 2 % cream Apply topically 3 (three) times a day as needed for itching or allergies (Patient not taking: Reported on 12/22/2023)    drospirenone-ethinyl estradiol (Vestura) 3-0.02 MG per tablet TAKE 1 TABLET BY MOUTH EVERY DAY (Patient not taking: Reported on 5/3/2024)    triamcinolone (KENALOG) 0.1 % cream Apply topically 2 (two) times a day (Patient not taking: Reported on 12/22/2023)     No current facility-administered medications on file prior to visit.     There are no discontinued medications.   She is allergic to pollen extract..    Review of Systems   Constitutional:  Negative for appetite change, chills, fatigue and fever.   HENT:  Negative for sore throat and trouble swallowing.    Eyes:  Negative for redness.   Respiratory:  Negative for shortness of breath.    Cardiovascular:  Negative for chest pain and palpitations.   Gastrointestinal:  Positive for diarrhea. Negative for abdominal pain and constipation.   Genitourinary:  Negative for dysuria and hematuria.   Musculoskeletal:  Negative for back pain and neck pain.   Skin:  Negative for rash.   Neurological:  Negative for seizures, weakness and headaches.   Hematological:  Negative for adenopathy.   Psychiatric/Behavioral:   Negative for confusion. The patient is not nervous/anxious.          Objective:      /76 (BP Location: Left arm, Patient Position: Sitting, Cuff Size: Adult)   Pulse 87   Temp 97.9 °F (36.6 °C) (Temporal)   Wt 62.1 kg (137 lb)   SpO2 98%   BMI 26.76 kg/m²     Results Reviewed       None            Recent Results (from the past 1344 hour(s))   Celiac Disease Comprehensive Panel    Collection Time: 05/07/24  8:32 AM   Result Value Ref Range    Interpretation      TISSUE TRANSGLUTAMINASE IGA <1.0 U/mL    IgA 82 47 - 310 mg/dL   Comprehensive metabolic panel    Collection Time: 05/07/24  8:32 AM   Result Value Ref Range    Glucose, Random 92 65 - 99 mg/dL    BUN 13 7 - 25 mg/dL    Creatinine 0.56 0.50 - 0.99 mg/dL    eGFR 118 > OR = 60 mL/min/1.73m2    SL AMB BUN/CREATININE RATIO SEE NOTE: 6 - 22 (calc)    Sodium 132 (L) 135 - 146 mmol/L    Potassium 4.1 3.5 - 5.3 mmol/L    Chloride 99 98 - 110 mmol/L    CO2 24 20 - 32 mmol/L    Calcium 9.1 8.6 - 10.2 mg/dL    Protein, Total 6.4 6.1 - 8.1 g/dL    Albumin 4.1 3.6 - 5.1 g/dL    Globulin 2.3 1.9 - 3.7 g/dL (calc)    Albumin/Globulin Ratio 1.8 1.0 - 2.5 (calc)    TOTAL BILIRUBIN 0.4 0.2 - 1.2 mg/dL    Alkaline Phosphatase 96 31 - 125 U/L    AST 22 10 - 30 U/L    ALT 27 6 - 29 U/L   Sedimentation rate, automated    Collection Time: 05/07/24  8:32 AM   Result Value Ref Range    Sedimentation Rate 9 < OR = 20 mm/h   CBC and differential    Collection Time: 05/07/24  8:32 AM   Result Value Ref Range    White Blood Cell Count 8.1 3.8 - 10.8 Thousand/uL    Red Blood Cell Count 4.02 3.80 - 5.10 Million/uL    Hemoglobin 11.7 11.7 - 15.5 g/dL    HCT 34.8 (L) 35.0 - 45.0 %    MCV 86.6 80.0 - 100.0 fL    MCH 29.1 27.0 - 33.0 pg    MCHC 33.6 32.0 - 36.0 g/dL    RDW 13.0 11.0 - 15.0 %    Platelet Count 315 140 - 400 Thousand/uL    SL AMB MPV 9.8 7.5 - 12.5 fL    Neutrophils (Absolute) 5,030 1,500 - 7,800 cells/uL    Lymphocytes (Absolute) 2,430 850 - 3,900 cells/uL     Monocytes (Absolute) 535 200 - 950 cells/uL    Eosinophils (Absolute) 73 15 - 500 cells/uL    Basophils ABS 32 0 - 200 cells/uL    Neutrophils 62.1 %    Lymphocytes 30.0 %    Monocytes 6.6 %    Eosinophils 0.9 %    Basophils PCT 0.4 %   Urine Microscopic    Collection Time: 05/07/24  8:32 AM   Result Value Ref Range    SL AMB WBC, URINE NONE SEEN < OR = 5 /HPF    RBC, Urine NONE SEEN < OR = 2 /HPF    Squamous Epithelial Cells NONE SEEN < OR = 5 /HPF    Bacteria, UA NONE SEEN NONE SEEN /HPF    Hyaline Casts NONE SEEN NONE SEEN /LPF    Comment(s)     C-reactive protein    Collection Time: 05/07/24  8:32 AM   Result Value Ref Range    C-Reactive Protein, Quant 10.9 (H) <8.0 mg/L   T4, free    Collection Time: 05/07/24  8:32 AM   Result Value Ref Range    Free t4 1.0 0.8 - 1.8 ng/dL   TSH, 3rd generation    Collection Time: 05/07/24  8:32 AM   Result Value Ref Range    TSH 1.93 mIU/L   Vitamin B12    Collection Time: 05/07/24  8:32 AM   Result Value Ref Range    Vitamin B-12 694 200 - 1,100 pg/mL   Vitamin D 25 hydroxy    Collection Time: 05/07/24  8:32 AM   Result Value Ref Range    Vitamin D, 25-Hydroxy, Serum 26 (L) 30 - 100 ng/mL   SAL/SHIG/CAMPY, CULTURE AND SHIGA TOXIN, EIA W/RFL TO E.COLI, CULTURE    Collection Time: 05/07/24  8:32 AM   Result Value Ref Range    Campylobacter Spp. AG      SAL/SHIG/CAMPY, CULTURE/SHIGA TOXIN, EIA W/RFL TO E.COLI, CULTURE      LIAT/SHIG/CAMPY, CULTURE/SHIGA TOXIN, EIA W/RFL TO E.COLI, CULTURE     Fecal fat, qualitative    Collection Time: 05/07/24  8:32 AM   Result Value Ref Range    Fecal Fat, Qualitative (A)    Fecal leukocytes    Collection Time: 05/07/24  8:32 AM   Result Value Ref Range    Fecal Leukocyte Stain     H. pylori antigen, stool    Collection Time: 05/07/24  8:32 AM   Result Value Ref Range    H. Pylori Ag, EIA, Stool     Giardia antigen    Collection Time: 05/07/24  8:32 AM   Result Value Ref Range    Giardia Ag, EIA, Stool     Lactoferrin, fecal, quantitative     Collection Time: 05/07/24  8:59 AM   Result Value Ref Range    Lactoferrin, Qn, Stool <6.25 <7.25 mcg/mL    Pancreatic elastase, fecal    Collection Time: 05/07/24  8:59 AM   Result Value Ref Range    Pancreatic Elastase-1 >500 mcg/g        Physical Exam  Constitutional:       General: She is not in acute distress.     Appearance: Normal appearance.   HENT:      Head: Normocephalic and atraumatic.      Nose: Nose normal.      Mouth/Throat:      Mouth: Mucous membranes are moist.   Eyes:      Extraocular Movements: Extraocular movements intact.      Pupils: Pupils are equal, round, and reactive to light.   Cardiovascular:      Rate and Rhythm: Normal rate and regular rhythm.      Pulses: Normal pulses.      Heart sounds: Normal heart sounds. No murmur heard.     No friction rub.   Pulmonary:      Effort: Pulmonary effort is normal. No respiratory distress.      Breath sounds: Normal breath sounds. No wheezing.   Abdominal:      General: Abdomen is flat. Bowel sounds are normal. There is no distension.      Palpations: Abdomen is soft. There is no mass.      Tenderness: There is no abdominal tenderness. There is no guarding.   Musculoskeletal:         General: Normal range of motion.      Cervical back: Normal range of motion.   Neurological:      General: No focal deficit present.      Mental Status: She is alert and oriented to person, place, and time. Mental status is at baseline.      Cranial Nerves: No cranial nerve deficit.   Psychiatric:         Mood and Affect: Mood normal.         Behavior: Behavior normal.

## 2024-06-11 ENCOUNTER — CONSULT (OUTPATIENT)
Dept: GASTROENTEROLOGY | Facility: MEDICAL CENTER | Age: 44
End: 2024-06-11
Payer: COMMERCIAL

## 2024-06-11 VITALS
SYSTOLIC BLOOD PRESSURE: 126 MMHG | OXYGEN SATURATION: 98 % | HEART RATE: 71 BPM | TEMPERATURE: 97.8 F | HEIGHT: 60 IN | BODY MASS INDEX: 26.35 KG/M2 | WEIGHT: 134.2 LBS | DIASTOLIC BLOOD PRESSURE: 78 MMHG

## 2024-06-11 DIAGNOSIS — R19.7 DIARRHEA, UNSPECIFIED TYPE: Primary | ICD-10-CM

## 2024-06-11 DIAGNOSIS — R14.0 BLOATING: ICD-10-CM

## 2024-06-11 PROCEDURE — 99244 OFF/OP CNSLTJ NEW/EST MOD 40: CPT | Performed by: PHYSICIAN ASSISTANT

## 2024-06-11 NOTE — PROGRESS NOTES
[facility] - Outpatient Consultation  Matt Sherwood 43 y.o. female MRN: [id number]  Encounter: [id number]      Assessment and Plan    1. Diarrhea  2. Abdominal bloating   The patient states that she has chronic multiple loose to watery bowel movements typically worse in the morning on a daily basis but recently she is also developed significant abdominal bloating.  Does have some crampy pain before a bowel movement that resolved thereafter. She states that anytime she eats or drinks anything she becomes significantly bloated.  She has seen her primary care physician and thus far workup with them has included celiac serologies, TSH, bacterial panel, fecal fat, fecal leukocytes, H. pylori, Giardia, pancreatic fecal elastase without concern (fecal fat was elevated but pancreatic fecal elastase was normal).  She has no other GI symptoms or complaints.  Has not had a prior EGD or colonoscopy.  -Obtain fecal calprotectin and if elevated pursue colonoscopy  -SIBO test and if positive 2 week course of xifaxan  -If the above negative start a low fodmap diet    Follow up 3 months     ______________________________________________________________________    History of Present Illness  Matt Sherwood is a 43 y.o. female here for consultation of loose stools and abdominal bloating.  The patient states that she has chronic multiple loose to watery bowel movements typically worse in the morning on a daily basis but recently she is also developed significant abdominal bloating.  She states that anytime she eats or drinks anything she becomes significantly bloated.  She has seen her primary care physician and thus far workup with them has included celiac serologies, TSH, bacterial panel, fecal fat, fecal leukocytes, H. pylori, Giardia, pancreatic fecal elastase without concern.  She has no other GI symptoms or complaints.  Has not had a prior EGD or colonoscopy.      Review of Systems      Past Medical  History  Past Medical History:   Diagnosis Date    Abnormal Pap smear of cervix     with atypical squamous cells of undetermined sing (ASC-US)    Allergic rhinitis      delivery delivered 2016    Genital warts     HPV in female     Mild cervical dysplasia     Moderate dysplasia of cervix (ALISHA II)     Seasonal allergies     Subserosal leiomyoma of uterus     resolved     Varicella     had as child    Varicose veins of vulva and perineum 2018    Visual impairment     eyewear       Past Social history  Past Surgical History:   Procedure Laterality Date    BREAST BIOPSY Right 2022    neg stereo    CERVICAL BIOPSY  W/ LOOP ELECTRODE EXCISION       SECTION      COLPOSCOPY      HIATAL HERNIA REPAIR      MAMMO STEREOTACTIC BREAST BIOPSY RIGHT (ALL INC) Right 2022    MYOMECTOMY      resolved     [place] N[place]    [place] N/A 2018    Procedure:  SECTION () REPEAT;  Surgeon: Flip Larios MD;  Location: [facility];  Service: Obstetrics    VENTRAL HERNIA REPAIR      WISDOM TOOTH EXTRACTION       Social History     Socioeconomic History    Marital status: /Civil Union     Spouse name: Not on file    Number of children: Not on file    Years of education: Not on file    Highest education level: Not on file   Occupational History    Not on file   Tobacco Use    Smoking status: Never     Passive exposure: Past    Smokeless tobacco: Never   Vaping Use    Vaping status: Never Used   Substance and Sexual Activity    Alcohol use: Yes    Drug use: No    Sexual activity: Yes     Partners: Male     Birth control/protection: OCP   Other Topics Concern    Not on file   Social History Narrative    Almost always uses seat belt    Daily coffee consumption ( 1 cups/day)    Exercising regularly        Alcohol: Socially    As per [employer]     Social Determinants of Health     Financial Resource Strain:  Not on file   Food Insecurity: Not on file   Transportation Needs: Not on file   Physical Activity: Not on file   Stress: Not on file   Social Connections: Not on file   Intimate Partner Violence: Not on file   Housing Stability: Not on file     Social History     Substance and Sexual Activity   Alcohol Use Yes     Social History     Substance and Sexual Activity   Drug Use No     Social History     Tobacco Use   Smoking Status Never    Passive exposure: Past   Smokeless Tobacco Never       Past Family History  Family History   Problem Relation Age of Onset    Cancer Mother         skin melanoma    Cancer Father         Brain    Other Father         Glomus Jugulare Tumor    Cancer Paternal Aunt         breast as per allscripts    Breast cancer Paternal Aunt 58    Breast cancer additional onset Paternal Aunt     Diabetes Maternal Grandmother     Heart disease Maternal Grandmother         triple bypass surgery    Mental retardation Cousin     Alzheimer's disease Paternal Grandfather     Cancer Paternal Grandfather         colon    Colon cancer Paternal Grandfather     No Known Problems Daughter     No Known Problems Son     Esophageal cancer Maternal Grandfather 90    No Known Problems Paternal Grandmother     No Known Problems Sister     No Known Problems Sister     No Known Problems Maternal Aunt     No Known Problems Maternal Aunt     No Known Problems Maternal Uncle     No Known Problems Paternal Uncle     Endometrial cancer Neg Hx     Ovarian cancer Neg Hx        Current Medications  Current Outpatient Medications   Medication Sig Dispense Refill    cetirizine (ZyrTEC) 10 mg tablet Take 10 mg by mouth in the morning.      cyanocobalamin (VITAMIN B-12) 100 MCG tablet Take 100 mcg by mouth daily      drospirenone-ethinyl estradiol (Vestura) 3-0.02 MG per tablet Take 1 tablet by mouth daily      multivitamin (THERAGRAN) TABS Take 1 tablet by mouth daily      albuterol (ProAir HFA) 90 mcg/act inhaler Inhale 2 puffs  every 6 (six) hours as needed for wheezing (Patient not taking: Reported on 5/3/2024) 18 g 0    benzonatate (TESSALON) 200 MG capsule Take 1 capsule (200 mg total) by mouth 3 (three) times a day as needed for cough (Patient not taking: Reported on 5/3/2024) 20 capsule 0    diphenhydrAMINE (BENADRYL) 2 % cream Apply topically 3 (three) times a day as needed for itching or allergies (Patient not taking: Reported on 12/22/2023) 15 g 0    drospirenone-ethinyl estradiol (Vestura) 3-0.02 MG per tablet TAKE 1 TABLET BY MOUTH EVERY DAY (Patient not taking: Reported on 5/3/2024) 84 tablet 2    pantoprazole (PROTONIX) 40 mg tablet Take 1 tablet (40 mg total) by mouth daily (Patient taking differently: Take 40 mg by mouth if needed) 30 tablet 0    triamcinolone (KENALOG) 0.1 % cream Apply topically 2 (two) times a day (Patient not taking: Reported on 12/22/2023) 80 g 0     No current facility-administered medications for this visit.       Allergies  Allergies   Allergen Reactions    Pollen Extract Allergic Rhinitis         The following portions of the patient's history were reviewed and updated as appropriate: allergies, current medications, past medical history, past social history, past surgical history and problem list.      Vitals  There were no vitals filed for this visit.      Physical Exam  Constitutional   General appearance: Patient is seated and in no acute distress, well appearing and well nourished.   Head and Face   Head and face: Normal.    Eyes   Conjunctiva and lids: No erythema, swelling or discharge.  Anicteric.  Ears, Nose, Mouth, and Throat   Hearing: Normal.    Neck: Supple, trachea midline.  Pulmonary   Respiratory effort: No increased work of breathing or signs of respiratory distress.    Cardiovascular   Examination of extremities for edema and/or varicosities: Normal.    Musculoskeletal   Gait and station: Normal   Skin   Skin and subcutaneous tissue: Warm, dry, and intact. No visible jaundice,  lesions or rashes.  Psychiatric   Judgment and insight: Normal  Recent and remote memory:  Normal  Mood and affect: Normal       Results  No visits with results within 1 Day(s) from this visit.   Latest known visit with results is:   Orders Only on 05/07/2024   Component Date Value    Lactoferrin, Qn, Stool 05/07/2024 <6.25     Pancreatic Elastase-1 05/07/2024 >500        Radiology Results  No results found.    Orders  No orders of the defined types were placed in this encounter.      Answers submitted by the patient for this visit:  Abdominal Pain Questionnaire (Submitted on 6/10/2024)  Chief Complaint: Abdominal pain  Chronicity: recurrent  Onset: more than 1 year ago  Onset quality: gradual  Frequency: intermittently  Episode duration: 1 Weeks  Progression since onset: waxing and waning  Pain location: epigastric region  Pain - numeric: 4/10  Pain quality: cramping, a sensation of fullness, sharp  Radiates to: does not radiate  anorexia: No  belching: Yes  flatus: Yes  hematochezia: No  melena: No  weight loss: No  Aggravated by: nothing  Relieved by: movement

## 2024-06-12 ENCOUNTER — TELEPHONE (OUTPATIENT)
Age: 44
End: 2024-06-12

## 2024-06-12 NOTE — TELEPHONE ENCOUNTER
Patient calling as a lab was ordered at office visit but patient does not have order. Patient goes to [facility]. Review of chart does show an active order for stool testing. Order faxed to [facility] at [phone].

## 2024-06-13 NOTE — TELEPHONE ENCOUNTER
Pt called in stating that she is at [facility] and they never received anything. Resent the stool to fax [phone].

## 2024-06-18 ENCOUNTER — ANNUAL EXAM (OUTPATIENT)
Dept: OBGYN CLINIC | Facility: CLINIC | Age: 44
End: 2024-06-18
Payer: COMMERCIAL

## 2024-06-18 VITALS
HEIGHT: 61 IN | SYSTOLIC BLOOD PRESSURE: 100 MMHG | DIASTOLIC BLOOD PRESSURE: 68 MMHG | BODY MASS INDEX: 25.49 KG/M2 | WEIGHT: 135 LBS

## 2024-06-18 DIAGNOSIS — Z01.419 ENCOUNTER FOR GYNECOLOGICAL EXAMINATION WITHOUT ABNORMAL FINDING: Primary | ICD-10-CM

## 2024-06-18 DIAGNOSIS — Z30.41 SURVEILLANCE OF CONTRACEPTIVE PILL: ICD-10-CM

## 2024-06-18 DIAGNOSIS — Z12.31 ENCOUNTER FOR SCREENING MAMMOGRAM FOR MALIGNANT NEOPLASM OF BREAST: ICD-10-CM

## 2024-06-18 PROCEDURE — S0612 ANNUAL GYNECOLOGICAL EXAMINA: HCPCS | Performed by: OBSTETRICS & GYNECOLOGY

## 2024-06-18 RX ORDER — DROSPIRENONE AND ETHINYL ESTRADIOL 0.02-3(28)
1 KIT ORAL DAILY
Qty: 90 TABLET | Refills: 3 | Status: SHIPPED | OUTPATIENT
Start: 2024-06-18

## 2024-06-18 NOTE — PROGRESS NOTES
Matt Sherwood  1980      CC:  Yearly exam    S:  43 y.o. female here for yearly exam. Her cycles are regular, not heavy or crampy.     She is struggling with her weight now in her 40's.  Discussed strength training, exercise.  She is also struggling with abdominal distension at times - being worked up for various things including SIBO.    Sexual activity: She is sexually active without pain, bleeding or dryness.     Contraception: She uses BCP for contraception.     Last Pap 5/24/2021 - normal/negative HPV  Last Mammo 2/15/2024 - BIRAD-1    We reviewed [facility] guidelines for Pap testing today.       Current Outpatient Medications:     cetirizine (ZyrTEC) 10 mg tablet, Take 10 mg by mouth in the morning., Disp: , Rfl:     cyanocobalamin (VITAMIN B-12) 100 MCG tablet, Take 100 mcg by mouth daily, Disp: , Rfl:     drospirenone-ethinyl estradiol (Vestura) 3-0.02 MG per tablet, Take 1 tablet by mouth daily, Disp: , Rfl:     multivitamin (THERAGRAN) TABS, Take 1 tablet by mouth daily (Patient not taking: Reported on 6/18/2024), Disp: , Rfl:     pantoprazole (PROTONIX) 40 mg tablet, Take 1 tablet (40 mg total) by mouth daily (Patient taking differently: Take 40 mg by mouth if needed), Disp: 30 tablet, Rfl: 0    triamcinolone (KENALOG) 0.1 % cream, Apply topically 2 (two) times a day (Patient not taking: Reported on 12/22/2023), Disp: 80 g, Rfl: 0  Social History     Socioeconomic History    Marital status: /Civil Union     Spouse name: Not on file    Number of children: Not on file    Years of education: Not on file    Highest education level: Not on file   Occupational History    Not on file   Tobacco Use    Smoking status: Never     Passive exposure: Past    Smokeless tobacco: Never   Vaping Use    Vaping status: Never Used   Substance and Sexual Activity    Alcohol use: Yes    Drug use: No    Sexual activity: Yes     Partners: Male     Birth control/protection: OCP   Other Topics Concern    Not on file   Social  History Narrative    Almost always uses seat belt    Daily coffee consumption ( 1 cups/day)    Exercising regularly        Alcohol: Socially    As per [email or web address]     Social Determinants of Health     Financial Resource Strain: Not on file   Food Insecurity: Not on file   Transportation Needs: Not on file   Physical Activity: Not on file   Stress: Not on file   Social Connections: Not on file   Intimate Partner Violence: Not on file   Housing Stability: Not on file     Family History   Problem Relation Age of Onset    Cancer Mother         skin melanoma    Cancer Father         Brain    Other Father         Glomus Jugulare Tumor    Cancer Paternal Aunt         breast as per allscripts    Breast cancer Paternal Aunt 58    Breast cancer additional onset Paternal Aunt     Diabetes Maternal Grandmother     Heart disease Maternal Grandmother         triple bypass surgery    Mental retardation Cousin     Alzheimer's disease Paternal Grandfather     Cancer Paternal Grandfather         colon    Colon cancer Paternal Grandfather     No Known Problems Daughter     No Known Problems Son     Esophageal cancer Maternal Grandfather 90    No Known Problems Paternal Grandmother     No Known Problems Sister     No Known Problems Sister     No Known Problems Maternal Aunt     No Known Problems Maternal Aunt     No Known Problems Maternal Uncle     No Known Problems Paternal Uncle     Endometrial cancer Neg Hx     Ovarian cancer Neg Hx       Past Medical History:   Diagnosis Date    Abnormal Pap smear of cervix     with atypical squamous cells of undetermined sing (ASC-US)    Allergic rhinitis      delivery delivered 2016    Genital warts     HPV in female     Mild cervical dysplasia     Moderate dysplasia of cervix (ALISHA II)     Seasonal allergies     Subserosal leiomyoma of uterus     resolved     Varicella     had as child    Varicose veins of vulva and perineum 2018    Visual impairment     eyewear     "    Review of Systems   Respiratory: Negative.    Cardiovascular: Negative.    Gastrointestinal: Negative for constipation and diarrhea.   Genitourinary: Negative for difficulty urinating, pelvic pain, vaginal bleeding, vaginal discharge, itching or odor.    O:  Blood pressure 100/68, height 5' 0.5\" (1.537 m), weight 61.2 kg (135 lb), last menstrual period 05/30/2024, not currently breastfeeding.    Patient appears well and is not in distress  Neck is supple without masses  Breasts are symmetrical without mass, tenderness, nipple discharge, skin changes or adenopathy.   Abdomen is soft and nontender without masses.   External genitals are normal without lesions or rashes.  Urethral meatus and urethra are normal  Bladder is normal to palpation  Vagina is normal without discharge or bleeding.   Cervix is normal without discharge or lesion.   Uterus is normal, mobile, nontender without palpable mass.  Adnexa are normal, nontender, without palpable mass.     A:   Yearly exam.     P:   Pap due 2026   Mammo slip provided    BCP refills sent    RTO one year for yearly exam or sooner as needed.     "

## 2024-06-20 NOTE — TELEPHONE ENCOUNTER
Pt calling pt has breath test kit pt will be dropping this office to office on 6/21/2024. When looking in chart I do not see active order for this.

## 2024-06-21 ENCOUNTER — TELEPHONE (OUTPATIENT)
Dept: GASTROENTEROLOGY | Facility: MEDICAL CENTER | Age: 44
End: 2024-06-21

## 2024-06-25 ENCOUNTER — OFFICE VISIT (OUTPATIENT)
Dept: GASTROENTEROLOGY | Facility: CLINIC | Age: 44
End: 2024-06-25
Payer: COMMERCIAL

## 2024-06-25 DIAGNOSIS — R19.7 DIARRHEA, UNSPECIFIED TYPE: ICD-10-CM

## 2024-06-25 DIAGNOSIS — R14.0 BLOATING: Primary | ICD-10-CM

## 2024-06-25 PROCEDURE — 91065 BREATH HYDROGEN/METHANE TEST: CPT | Performed by: INTERNAL MEDICINE

## 2024-06-25 NOTE — PROGRESS NOTES
[facility]       Bacterial Overgrowth Analytical Record    Matt Sherwood 43 y.o. female MRN: [id number]      Date of Test: 6/21/24    Substrate Given: Lactulose    Ordering Provider: Anne Tamayo     Medical Assistant: Sena MULLIGAN    Symptoms: diarrhea and bloating    The patient presents for bacterial overgrowth testing.    Patient fasted overnight. Baseline readings obtained.   Breath test performed every 20 min for a total of 3 hr    Sample Clock Time ppmH2 ppmCH4 Co2% Donal   Baseline 10:05a   4 5 3.0 1.83   #1  20 minutes 10:25a 6 8 2.7 2.03   #2  40 minutes 10:45a 4 8 2.7 2.03   #3  60 minutes 11:05a 13 11 2.5 2.20   #4  80 minutes 11:25a 26 17 2.3 2.39   #5  100 minutes 11:45a 59 20 2.5 2.20   #6  120 minutes 12:05p 44 15 2.6 2.11   #7  140 minutes 12:25p 33 13 2.5 2.20   #8  160 minutes 12:45p 66 20 2.5 2.20   #9  180 minutes 1:05p 84 23 2.1 2.61       Physician interpretation: Study positive for SIBO based on increasing of hydrogen and methane

## 2024-06-25 NOTE — Clinical Note
Please let the patient know that her SIBO test is positive and I would recommend a 2-week course of Xifaxan which have sent to her pharmacy.  It does require a prior authorization.

## 2024-06-26 ENCOUNTER — TELEPHONE (OUTPATIENT)
Age: 44
End: 2024-06-26

## 2024-06-26 LAB — CALPROTECTIN STL-MCNT: 10 MCG/G

## 2024-06-26 NOTE — TELEPHONE ENCOUNTER
PA for Xifaxan    Submitted via    []CMM-KEY   [x][employer]-Case ID #   [id number]:Harpal:[phone]   []Faxed to plan   []Other website   []Phone call Case ID #     Office notes sent, clinical questions answered. Awaiting determination    Turnaround time for your insurance to make a decision on your Prior Authorization can take 7-21 business days.

## 2024-06-27 NOTE — TELEPHONE ENCOUNTER
PA for Xifaxan Approved     Date(s) approved  June 26, 2024 to June 26, 2025     Case #[id number]     Patient advised by          [x] [employer] Message  [] Phone call   [x]LMOM  []L/M to call office as no active Communication consent on file  []Unable to leave detailed message as VM not approved on Communication consent       Pharmacy advised by    [x]Fax  []Phone call    Approval letter scanned into Media Yes

## 2024-06-27 NOTE — TELEPHONE ENCOUNTER
Pt called in regarding medications she wanted to see if this was from the SIBO results advised  Study positive for SIBO
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
## 2024-07-01 ENCOUNTER — TRANSCRIBE ORDERS (OUTPATIENT)
Dept: GASTROENTEROLOGY | Facility: CLINIC | Age: 44
End: 2024-07-01

## 2024-07-02 ENCOUNTER — PATIENT MESSAGE (OUTPATIENT)
Dept: GASTROENTEROLOGY | Facility: MEDICAL CENTER | Age: 44
End: 2024-07-02

## 2024-07-03 DIAGNOSIS — R14.0 BLOATING: Primary | ICD-10-CM

## 2024-07-03 DIAGNOSIS — K63.8219 SMALL INTESTINAL BACTERIAL OVERGROWTH: ICD-10-CM

## 2024-07-03 RX ORDER — NEOMYCIN SULFATE 500 MG/1
500 TABLET ORAL 2 TIMES DAILY
Qty: 28 TABLET | Refills: 0 | Status: SHIPPED | OUTPATIENT
Start: 2024-07-03 | End: 2024-07-17

## 2024-10-03 ENCOUNTER — OFFICE VISIT (OUTPATIENT)
Dept: GASTROENTEROLOGY | Facility: MEDICAL CENTER | Age: 44
End: 2024-10-03
Payer: COMMERCIAL

## 2024-10-03 ENCOUNTER — APPOINTMENT (OUTPATIENT)
Dept: RADIOLOGY | Facility: MEDICAL CENTER | Age: 44
End: 2024-10-03
Payer: COMMERCIAL

## 2024-10-03 VITALS
DIASTOLIC BLOOD PRESSURE: 77 MMHG | HEART RATE: 73 BPM | WEIGHT: 136 LBS | HEIGHT: 62 IN | BODY MASS INDEX: 25.03 KG/M2 | TEMPERATURE: 98.5 F | SYSTOLIC BLOOD PRESSURE: 121 MMHG | OXYGEN SATURATION: 99 %

## 2024-10-03 DIAGNOSIS — R14.0 ABDOMINAL BLOATING: ICD-10-CM

## 2024-10-03 DIAGNOSIS — R19.7 DIARRHEA, UNSPECIFIED TYPE: Primary | ICD-10-CM

## 2024-10-03 DIAGNOSIS — R19.7 DIARRHEA, UNSPECIFIED TYPE: ICD-10-CM

## 2024-10-03 PROCEDURE — 99214 OFFICE O/P EST MOD 30 MIN: CPT | Performed by: PHYSICIAN ASSISTANT

## 2024-10-03 PROCEDURE — 74018 RADEX ABDOMEN 1 VIEW: CPT

## 2024-10-03 NOTE — PROGRESS NOTES
[facility] - Outpatient Follow-up Note  Matt Sherwood 44 y.o. female MRN: [id number]  Encounter: [id number]      Assessment and Plan    1. Diarrhea  2. Abdominal bloating   The patient has chronic loose to watery bowel movements on a daily basis but more recently developed abdominal bloating.  Workup included celiac serologies, TSH, bacterial panel, fecal fat, fecal leukocytes, H. pylori, Giardia, pancreatic fecal elastase, and SIBO testing without concern aside for positive SIBO (fecal fat was elevated but pancreatic fecal elastase was normal).  She was treated with 2 weeks of Xifaxan which improved her bloating but she still has continued chronic diarrhea and even started to have urgency with 1 episode of incontinence.  She states that she does question if it is overflow diarrhea as sometimes it feels as though there is a blockage.  -Obtain KUB to rule out overflow diarrhea  -If KUB negative for constipation proceed with colonoscopy  -If colonoscopy is normal consider low FODMAP diet    Follow-up after colonoscopy    ______________________________________________________________________    History of Present Illness  Matt Sherwood is a 44 y.o. female here for follow up evaluation of diarrhea and abdominal bloating.  The patient has chronic loose to watery bowel movements on a daily basis but more recently developed abdominal bloating.  Workup included celiac serologies, TSH, bacterial panel, fecal fat, fecal leukocytes, H. pylori, Giardia, pancreatic fecal elastase, and SIBO testing without concern aside for positive SIBO (fecal fat was elevated but pancreatic fecal elastase was normal).  She was treated with 2 weeks of Xifaxan which improved her bloating but she still has continued chronic diarrhea and even started to have urgency with 1 episode of incontinence.  She states that she does question if it is overflow diarrhea as sometimes it feels as though there is a blockage.      Review  of Systems   Constitutional:  Negative for activity change, appetite change, chills, fatigue, fever and unexpected weight change.   Gastrointestinal:  Positive for abdominal pain and diarrhea. Negative for constipation, nausea and vomiting.   Genitourinary:  Positive for frequency. Negative for dysuria and hematuria.   Musculoskeletal:  Positive for arthralgias. Negative for myalgias.   Neurological:  Negative for headaches.       Past Medical History  Past Medical History:   Diagnosis Date    Abnormal Pap smear of cervix     with atypical squamous cells of undetermined sing (ASC-US)    Allergic rhinitis      delivery delivered 2016    Genital warts     HPV in female     Mild cervical dysplasia     Moderate dysplasia of cervix (ALISHA II)     Seasonal allergies     Subserosal leiomyoma of uterus     resolved     Varicella     had as child    Varicose veins of vulva and perineum 2018    Visual impairment     eyewear       Past Social history  Past Surgical History:   Procedure Laterality Date    BREAST BIOPSY Right 2022    neg stereo    CERVICAL BIOPSY  W/ LOOP ELECTRODE EXCISION       SECTION      COLPOSCOPY      HIATAL HERNIA REPAIR      MAMMO STEREOTACTIC BREAST BIOPSY RIGHT (ALL INC) Right 2022    MYOMECTOMY      resolved     UT  DELIVERY ONLY N/A 2016    2016 daughter    UT  DELIVERY ONLY N/A 2018    Procedure:  SECTION () REPEAT;  Surgeon: Flip Larios MD;  Location: [facility] ;  Service: Obstetrics    VENTRAL HERNIA REPAIR      WISDOM TOOTH EXTRACTION       Social History     Socioeconomic History    Marital status: /Civil Union     Spouse name: Not on file    Number of children: Not on file    Years of education: Not on file    Highest education level: Not on file   Occupational History    Not on file   Tobacco Use    Smoking status: Never     Passive exposure: Past    Smokeless tobacco: Never   Vaping Use     Vaping status: Never Used   Substance and Sexual Activity    Alcohol use: Yes     Comment: socially    Drug use: No    Sexual activity: Yes     Partners: Male     Birth control/protection: OCP   Other Topics Concern    Not on file   Social History Narrative    Almost always uses seat belt    Daily coffee consumption ( 1 cups/day)    Exercising regularly        Alcohol: Socially    As per [employer]     Social Determinants of Health     Financial Resource Strain: Not on file   Food Insecurity: Not on file   Transportation Needs: Not on file   Physical Activity: Not on file   Stress: Not on file   Social Connections: Not on file   Intimate Partner Violence: Not on file   Housing Stability: Not on file     Social History     Substance and Sexual Activity   Alcohol Use Yes    Comment: socially     Social History     Substance and Sexual Activity   Drug Use No     Social History     Tobacco Use   Smoking Status Never    Passive exposure: Past   Smokeless Tobacco Never       Past Family History  Family History   Problem Relation Age of Onset    Cancer Mother         skin melanoma    Cancer Father         Brain    Other Father         Glomus Jugulare Tumor    Cancer Paternal Aunt         breast as per allscripts    Breast cancer Paternal Aunt 58    Breast cancer additional onset Paternal Aunt     Diabetes Maternal Grandmother     Heart disease Maternal Grandmother         triple bypass surgery    Mental retardation Cousin     Alzheimer's disease Paternal Grandfather     Cancer Paternal Grandfather         colon    Colon cancer Paternal Grandfather     No Known Problems Daughter     No Known Problems Son     Esophageal cancer Maternal Grandfather 90    No Known Problems Paternal Grandmother     No Known Problems Sister     No Known Problems Sister     No Known Problems Maternal Aunt     No Known Problems Maternal Aunt     No Known Problems Maternal Uncle     No Known Problems Paternal Uncle     Endometrial cancer Neg Hx  "    Ovarian cancer Neg Hx        Current Medications  Current Outpatient Medications   Medication Sig Dispense Refill    cetirizine (ZyrTEC) 10 mg tablet Take 10 mg by mouth in the morning.      cyanocobalamin (VITAMIN B-12) 100 MCG tablet Take 100 mcg by mouth daily      drospirenone-ethinyl estradiol (Vestura) 3-0.02 MG per tablet Take 1 tablet by mouth daily 90 tablet 3    multivitamin (THERAGRAN) TABS Take 1 tablet by mouth daily (Patient not taking: Reported on 10/3/2024)      pantoprazole (PROTONIX) 40 mg tablet Take 1 tablet (40 mg total) by mouth daily (Patient taking differently: Take 40 mg by mouth if needed) 30 tablet 0    triamcinolone (KENALOG) 0.1 % cream Apply topically 2 (two) times a day (Patient not taking: Reported on 12/22/2023) 80 g 0     No current facility-administered medications for this visit.       Allergies  Allergies   Allergen Reactions    Pollen Extract Allergic Rhinitis         The following portions of the patient's history were reviewed and updated as appropriate: allergies, current medications, past medical history, past social history, past surgical history and problem list.      Vitals  Vitals:    10/03/24 0927   BP: 121/77   BP Location: Right arm   Patient Position: Sitting   Cuff Size: Standard   Pulse: 73   Temp: 98.5 °F (36.9 °C)   TempSrc: Tympanic   SpO2: 99%   Weight: 61.7 kg (136 lb)   Height: 5' 2\" (1.575 m)       Physical Exam  Constitutional   General appearance: Patient is seated and in no acute distress, well appearing and well nourished.   Head and Face   Head and face: Normal.    Eyes   Conjunctiva and lids: No erythema, swelling or discharge.  Anicteric.  Ears, Nose, Mouth, and Throat   Hearing: Normal.    Neck: Supple, trachea midline.  Pulmonary   Respiratory effort: No increased work of breathing or signs of respiratory distress.    Lungs: Clear to ascultation, no wheezes, rhonchi, or rales  Cardiovascular   Examination of extremities for edema and/or " varicosities: Normal.    Musculoskeletal   Gait and station: Normal    Skin   Skin and subcutaneous tissue: Warm, dry, and intact. No visible jaundice, lesions or rashes.  Psychiatric   Judgment and insight: Normal  Recent and remote memory:  Normal  Mood and affect: Normal      Results  No visits with results within 1 Day(s) from this visit.   Latest known visit with results is:   Orders Only on 06/19/2024   Component Date Value    Calprotectin 06/19/2024 10        Radiology Results  No results found.    Orders  No orders of the defined types were placed in this encounter.      Answers submitted by the patient for this visit:  Abdominal Pain Questionnaire (Submitted on 10/2/2024)  Chief Complaint: Abdominal pain  Chronicity: recurrent  Progression since onset: waxing and waning  Pain location: suprapubic region  Pain - numeric: 5/10  Pain quality: cramping, a sensation of fullness  anorexia: No  belching: Yes  flatus: Yes  hematochezia: No  melena: No  weight loss: No  Aggravated by: bowel movement  Relieved by: bowel movements, passing flatus

## 2024-10-16 ENCOUNTER — OFFICE VISIT (OUTPATIENT)
Dept: INTERNAL MEDICINE CLINIC | Facility: CLINIC | Age: 44
End: 2024-10-16
Payer: COMMERCIAL

## 2024-10-16 VITALS
OXYGEN SATURATION: 99 % | TEMPERATURE: 98.4 F | BODY MASS INDEX: 25.06 KG/M2 | DIASTOLIC BLOOD PRESSURE: 63 MMHG | HEIGHT: 62 IN | SYSTOLIC BLOOD PRESSURE: 112 MMHG | WEIGHT: 136.2 LBS | HEART RATE: 70 BPM

## 2024-10-16 DIAGNOSIS — R35.0 FREQUENCY OF URINATION: ICD-10-CM

## 2024-10-16 DIAGNOSIS — N30.00 ACUTE CYSTITIS WITHOUT HEMATURIA: Primary | ICD-10-CM

## 2024-10-16 LAB
SL AMB  POCT GLUCOSE, UA: NORMAL
SL AMB LEUKOCYTE ESTERASE,UA: 15
SL AMB POCT BILIRUBIN,UA: NORMAL
SL AMB POCT BLOOD,UA: NORMAL
SL AMB POCT CLARITY,UA: NORMAL
SL AMB POCT COLOR,UA: YELLOW
SL AMB POCT KETONES,UA: NORMAL
SL AMB POCT NITRITE,UA: NORMAL
SL AMB POCT PH,UA: 6
SL AMB POCT SPECIFIC GRAVITY,UA: 1.02
SL AMB POCT URINE PROTEIN: NORMAL
SL AMB POCT UROBILINOGEN: NORMAL

## 2024-10-16 PROCEDURE — 99213 OFFICE O/P EST LOW 20 MIN: CPT | Performed by: INTERNAL MEDICINE

## 2024-10-16 PROCEDURE — 81003 URINALYSIS AUTO W/O SCOPE: CPT | Performed by: INTERNAL MEDICINE

## 2024-10-16 RX ORDER — NITROFURANTOIN 25; 75 MG/1; MG/1
100 CAPSULE ORAL 2 TIMES DAILY
Qty: 10 CAPSULE | Refills: 0 | Status: SHIPPED | OUTPATIENT
Start: 2024-10-16 | End: 2024-10-21

## 2024-10-16 NOTE — PROGRESS NOTES
Assessment/Plan:           1. Acute cystitis without hematuria  Comments:  Macrobid was ordered.  Orders:  -     nitrofurantoin (MACROBID) 100 mg capsule; Take 1 capsule (100 mg total) by mouth 2 (two) times a day for 5 days  2. Frequency of urination  -     POCT urine dip         1. Acute cystitis without hematuria    - nitrofurantoin (MACROBID) 100 mg capsule; Take 1 capsule (100 mg total) by mouth 2 (two) times a day for 5 days  Dispense: 10 capsule; Refill: 0       No problem-specific Assessment & Plan notes found for this encounter.           Subjective:      Patient ID: Matt Sherwood is a 44 y.o. female.    HPI    The following portions of the patient's history were reviewed and updated as appropriate: She  has a past medical history of Abnormal Pap smear of cervix, Allergic rhinitis,  delivery delivered (2016), Genital warts, HPV in female, Mild cervical dysplasia, Moderate dysplasia of cervix (ALISHA II), Seasonal allergies, Subserosal leiomyoma of uterus, Varicella, Varicose veins of vulva and perineum (2018), and Visual impairment.  She There are no problems to display for this patient.    She  has a past surgical history that includes Cervical biopsy w/ loop electrode excision; Colposcopy; Boling tooth extraction; pr  delivery only (N/A, 2016); Ventral hernia repair; Myomectomy;  section; pr  delivery only (N/A, 2018); Hiatal hernia repair; Mammo stereotactic breast biopsy right (all inc) (Right, 2022); and Breast biopsy (Right, 2022).  Her family history includes Alzheimer's disease in her paternal grandfather; Breast cancer (age of onset: 58) in her paternal aunt; Breast cancer additional onset in her paternal aunt; Cancer in her father, mother, paternal aunt, and paternal grandfather; Colon cancer in her paternal grandfather; Diabetes in her maternal grandmother; Esophageal cancer (age of onset: 90) in her maternal grandfather; Heart  disease in her maternal grandmother; Mental retardation in her cousin; No Known Problems in her daughter, maternal aunt, maternal aunt, maternal uncle, paternal grandmother, paternal uncle, sister, sister, and son; Other in her father.  She  reports that she has never smoked. She has been exposed to tobacco smoke. She has never used smokeless tobacco. She reports current alcohol use. She reports that she does not use drugs.  Current Outpatient Medications   Medication Sig Dispense Refill    cetirizine (ZyrTEC) 10 mg tablet Take 10 mg by mouth in the morning.      cyanocobalamin (VITAMIN B-12) 100 MCG tablet Take 100 mcg by mouth daily      drospirenone-ethinyl estradiol (Vestura) 3-0.02 MG per tablet Take 1 tablet by mouth daily 90 tablet 3    nitrofurantoin (MACROBID) 100 mg capsule Take 1 capsule (100 mg total) by mouth 2 (two) times a day for 5 days 10 capsule 0    multivitamin (THERAGRAN) TABS Take 1 tablet by mouth daily (Patient not taking: Reported on 10/3/2024)      pantoprazole (PROTONIX) 40 mg tablet Take 1 tablet (40 mg total) by mouth daily (Patient taking differently: Take 40 mg by mouth if needed) 30 tablet 0    triamcinolone (KENALOG) 0.1 % cream Apply topically 2 (two) times a day (Patient not taking: Reported on 12/22/2023) 80 g 0     No current facility-administered medications for this visit.     Current Outpatient Medications on File Prior to Visit   Medication Sig    cetirizine (ZyrTEC) 10 mg tablet Take 10 mg by mouth in the morning.    cyanocobalamin (VITAMIN B-12) 100 MCG tablet Take 100 mcg by mouth daily    drospirenone-ethinyl estradiol (Vestura) 3-0.02 MG per tablet Take 1 tablet by mouth daily    multivitamin (THERAGRAN) TABS Take 1 tablet by mouth daily (Patient not taking: Reported on 10/3/2024)    pantoprazole (PROTONIX) 40 mg tablet Take 1 tablet (40 mg total) by mouth daily (Patient taking differently: Take 40 mg by mouth if needed)    triamcinolone (KENALOG) 0.1 % cream Apply  "topically 2 (two) times a day (Patient not taking: Reported on 12/22/2023)     No current facility-administered medications on file prior to visit.     There are no discontinued medications.   She is allergic to pollen extract..    Review of Systems   Constitutional:  Negative for appetite change, chills, fatigue and fever.   HENT:  Negative for sore throat and trouble swallowing.    Eyes:  Negative for redness.   Respiratory:  Negative for shortness of breath.    Cardiovascular:  Negative for chest pain and palpitations.   Gastrointestinal:  Negative for abdominal pain, constipation and diarrhea.   Genitourinary:  Positive for dysuria and frequency. Negative for hematuria.   Musculoskeletal:  Negative for back pain and neck pain.   Skin:  Negative for rash.   Neurological:  Negative for seizures, weakness and headaches.   Hematological:  Negative for adenopathy.   Psychiatric/Behavioral:  Negative for confusion. The patient is not nervous/anxious.          Objective:      /63 (BP Location: Left arm, Patient Position: Sitting, Cuff Size: Adult)   Pulse 70   Temp 98.4 °F (36.9 °C) (Temporal)   Ht 5' 2\" (1.575 m)   Wt 61.8 kg (136 lb 3.2 oz)   SpO2 99%   BMI 24.91 kg/m²     Results Reviewed       None            Recent Results (from the past 1344 hour(s))   POCT urine dip    Collection Time: 10/16/24  5:53 PM   Result Value Ref Range    LEUKOCYTE ESTERASE,UA 15     NITRITE,UA neg     SL AMB POCT UROBILINOGEN 0.2mg     POCT URINE PROTEIN neg      PH,UA 6.0     BLOOD,UA 1+     SPECIFIC GRAVITY,UA 1.025     KETONES,UA neg     BILIRUBIN,UA neg     GLUCOSE, UA neg      COLOR,UA yellow     CLARITY,UA cloudy         Physical Exam  Constitutional:       Appearance: Normal appearance. She is normal weight.   HENT:      Head: Normocephalic and atraumatic.      Nose: Nose normal.      Mouth/Throat:      Mouth: Mucous membranes are moist.   Eyes:      Extraocular Movements: Extraocular movements intact.      Pupils: " Pupils are equal, round, and reactive to light.   Pulmonary:      Effort: Pulmonary effort is normal.   Abdominal:      General: Abdomen is flat.      Palpations: Abdomen is soft.      Tenderness: There is no abdominal tenderness. There is no guarding.   Musculoskeletal:         General: Normal range of motion.      Cervical back: Normal range of motion and neck supple.   Neurological:      General: No focal deficit present.      Mental Status: She is alert and oriented to person, place, and time. Mental status is at baseline.

## 2024-12-23 ENCOUNTER — OFFICE VISIT (OUTPATIENT)
Dept: INTERNAL MEDICINE CLINIC | Facility: CLINIC | Age: 44
End: 2024-12-23
Payer: COMMERCIAL

## 2024-12-23 VITALS
OXYGEN SATURATION: 98 % | BODY MASS INDEX: 25.21 KG/M2 | HEIGHT: 62 IN | SYSTOLIC BLOOD PRESSURE: 128 MMHG | TEMPERATURE: 98.6 F | DIASTOLIC BLOOD PRESSURE: 80 MMHG | HEART RATE: 68 BPM | WEIGHT: 137 LBS

## 2024-12-23 DIAGNOSIS — B00.1 COLD SORE: ICD-10-CM

## 2024-12-23 DIAGNOSIS — Z00.00 ANNUAL PHYSICAL EXAM: Primary | ICD-10-CM

## 2024-12-23 DIAGNOSIS — R53.83 OTHER FATIGUE: ICD-10-CM

## 2024-12-23 PROCEDURE — 99213 OFFICE O/P EST LOW 20 MIN: CPT | Performed by: INTERNAL MEDICINE

## 2024-12-23 PROCEDURE — 99396 PREV VISIT EST AGE 40-64: CPT | Performed by: INTERNAL MEDICINE

## 2024-12-23 RX ORDER — VALACYCLOVIR HYDROCHLORIDE 1 G/1
2000 TABLET, FILM COATED ORAL 2 TIMES DAILY
Qty: 4 TABLET | Refills: 1 | Status: SHIPPED | OUTPATIENT
Start: 2024-12-23 | End: 2024-12-24

## 2024-12-23 NOTE — PATIENT INSTRUCTIONS
"Patient Education     Routine physical for adults   The Basics   Written by the doctors and editors at [facility]   What is a physical? -- A physical is a routine visit, or \"check-up,\" with your doctor. You might also hear it called a \"wellness visit\" or \"preventive visit.\"  During each visit, the doctor will:   Ask about your physical and mental health   Ask about your habits, behaviors, and lifestyle   Do an exam   Give you vaccines if needed   Talk to you about any medicines you take   Give advice about your health   Answer your questions  Getting regular check-ups is an important part of taking care of your health. It can help your doctor find and treat any problems you have. But it's also important for preventing health problems.  A routine physical is different from a \"sick visit.\" A sick visit is when you see a doctor because of a health concern or problem. Since physicals are scheduled ahead of time, you can think about what you want to ask the doctor.  How often should I get a physical? -- It depends on your age and health. In general, for people age 21 years and older:   If you are younger than 50 years, you might be able to get a physical every 3 years.   If you are 50 years or older, your doctor might recommend a physical every year.  If you have an ongoing health condition, like diabetes or high blood pressure, your doctor will probably want to see you more often.  What happens during a physical? -- In general, each visit will include:   Physical exam - The doctor or nurse will check your height, weight, heart rate, and blood pressure. They will also look at your eyes and ears. They will ask about how you are feeling and whether you have any symptoms that bother you.   Medicines - It's a good idea to bring a list of all the medicines you take to each doctor visit. Your doctor will talk to you about your medicines and answer any questions. Tell them if you are having any side effects that bother you. You " "should also tell them if you are having trouble paying for any of your medicines.   Habits and behaviors - This includes:   Your diet   Your exercise habits   Whether you smoke, drink alcohol, or use drugs   Whether you are sexually active   Whether you feel safe at home  Your doctor will talk to you about things you can do to improve your health and lower your risk of health problems. They will also offer help and support. For example, if you want to quit smoking, they can give you advice and might prescribe medicines. If you want to improve your diet or get more physical activity, they can help you with this, too.   Lab tests, if needed - The tests you get will depend on your age and situation. For example, your doctor might want to check your:   Cholesterol   Blood sugar   Iron level   Vaccines - The recommended vaccines will depend on your age, health, and what vaccines you already had. Vaccines are very important because they can prevent certain serious or deadly infections.   Discussion of screening - \"Screening\" means checking for diseases or other health problems before they cause symptoms. Your doctor can recommend screening based on your age, risk, and preferences. This might include tests to check for:   Cancer, such as breast, prostate, cervical, ovarian, colorectal, prostate, lung, or skin cancer   Sexually transmitted infections, such as chlamydia and gonorrhea   Mental health conditions like depression and anxiety  Your doctor will talk to you about the different types of screening tests. They can help you decide which screenings to have. They can also explain what the results might mean.   Answering questions - The physical is a good time to ask the doctor or nurse questions about your health. If needed, they can refer you to other doctors or specialists, too.  Adults older than 65 years often need other care, too. As you get older, your doctor will talk to you about:   How to prevent falling at " home   Hearing or vision tests   Memory testing   How to take your medicines safely   Making sure that you have the help and support you need at home  All topics are updated as new evidence becomes available and our peer review process is complete.  This topic retrieved from [employer] on: May 02, 2024.  Topic 139703 Version 1.0  Release: 32.4.3 - C32.122  © 2024 UpToDate, Inc. and/or its affiliates. All rights reserved.  Consumer Information Use and Disclaimer   Disclaimer: This generalized information is a limited summary of diagnosis, treatment, and/or medication information. It is not meant to be comprehensive and should be used as a tool to help the user understand and/or assess potential diagnostic and treatment options. It does NOT include all information about conditions, treatments, medications, side effects, or risks that may apply to a specific patient. It is not intended to be medical advice or a substitute for the medical advice, diagnosis, or treatment of a health care provider based on the health care provider's examination and assessment of a patient's specific and unique circumstances. Patients must speak with a health care provider for complete information about their health, medical questions, and treatment options, including any risks or benefits regarding use of medications. This information does not endorse any treatments or medications as safe, effective, or approved for treating a specific patient. Up[employer], Inc[employer] and its affiliates disclaim any warranty or liability relating to this information or the use thereof.The use of this information is governed by the Terms of Use, available at https://www.woltersVistronixuwer.com/en/know/clinical-effectiveness-terms. 2024© UpToDate, Inc. and its affiliates and/or licensors. All rights reserved.  Copyright   © 2024 [employer], [employer]. and/or its affiliates. All rights reserved.

## 2024-12-23 NOTE — PROGRESS NOTES
Adult Annual Physical  Name: Matt Sherwood      : 1980      MRN: [id number]  Encounter Provider: Reid Nunez MD  Encounter Date: 2024   Encounter department: [facility] INTERNAL MEDICINE    Assessment & Plan  Annual physical exam    Orders:    CBC and differential; Future    Comprehensive metabolic panel; Future    Lipid panel; Future    Urinalysis with microscopic; Future    TSH, 3rd generation; Future    Cold sore    Orders:    valACYclovir (VALTREX) 1,000 mg tablet; Take 2 tablets (2,000 mg total) by mouth 2 (two) times a day for 1 day    Other fatigue    Orders:    Cortisol Level, AM Specimen; Future      Immunizations and preventive care screenings were discussed with patient today. Appropriate education was printed on patient's after visit summary.    Counseling:  Exercise: the importance of regular exercise/physical activity was discussed. Recommend exercise 3-5 times per week for at least 30 minutes.          History of Present Illness     Adult Annual Physical:  Patient presents for annual physical.     Diet and Physical Activity:  - Diet/Nutrition: well balanced diet and consuming 3-5 servings of fruits/vegetables daily.  - Exercise: 3-4 times a week on average and moderate cardiovascular exercise.    Depression Screening:  - PHQ-2 Score: 0    General Health:  - Sleep: 4-6 hours of sleep on average.  - Hearing: normal hearing bilateral ears.  - Vision: wears contacts, goes for regular eye exams and wears glasses.  - Dental: regular dental visits and brushes teeth twice daily.    /GYN Health:  - Follows with GYN: yes.   - Menopause: premenopausal.   - History of STDs: no  - Contraception: oral contraceptives.      Review of Systems   Constitutional:  Negative for appetite change, chills, fatigue and fever.   HENT:  Negative for sore throat and trouble swallowing.         Sores in mouth.   Eyes:  Negative for redness.   Respiratory:  Negative for shortness of breath.   "  Cardiovascular:  Negative for chest pain and palpitations.   Gastrointestinal:  Negative for abdominal pain, constipation and diarrhea.   Genitourinary:  Negative for dysuria and hematuria.   Musculoskeletal:  Negative for back pain and neck pain.   Skin:  Negative for rash.   Neurological:  Negative for seizures, weakness and headaches.   Hematological:  Negative for adenopathy.   Psychiatric/Behavioral:  Negative for confusion. The patient is not nervous/anxious.      Pertinent Medical History         Medical History Reviewed by provider this encounter:     .  Current Outpatient Medications on File Prior to Visit   Medication Sig Dispense Refill    cetirizine (ZyrTEC) 10 mg tablet Take 10 mg by mouth in the morning.      cyanocobalamin (VITAMIN B-12) 100 MCG tablet Take 100 mcg by mouth daily      drospirenone-ethinyl estradiol (Vestura) 3-0.02 MG per tablet Take 1 tablet by mouth daily 90 tablet 3    multivitamin (THERAGRAN) TABS Take 1 tablet by mouth daily      pantoprazole (PROTONIX) 40 mg tablet Take 1 tablet (40 mg total) by mouth daily (Patient taking differently: Take 40 mg by mouth if needed) 30 tablet 0    triamcinolone (KENALOG) 0.1 % cream Apply topically 2 (two) times a day (Patient not taking: Reported on 12/22/2023) 80 g 0     No current facility-administered medications on file prior to visit.      Social History     Tobacco Use    Smoking status: Never     Passive exposure: Past    Smokeless tobacco: Never   Vaping Use    Vaping status: Never Used   Substance and Sexual Activity    Alcohol use: Yes     Comment: socially    Drug use: No    Sexual activity: Yes     Partners: Male     Birth control/protection: OCP       Objective   /80 (BP Location: Left arm, Patient Position: Sitting, Cuff Size: Standard)   Pulse 68   Temp 98.6 °F (37 °C) (Temporal)   Ht 5' 2\" (1.575 m)   Wt 62.1 kg (137 lb)   SpO2 98%   BMI 25.06 kg/m²     Physical Exam  Vitals and nursing note reviewed. "   Constitutional:       General: She is not in acute distress.     Appearance: She is well-developed.   HENT:      Head: Normocephalic and atraumatic.      Right Ear: Tympanic membrane normal.      Left Ear: Tympanic membrane normal.      Mouth/Throat:      Mouth: Mucous membranes are moist.      Comments: Cold sore present on tongue.  Eyes:      Extraocular Movements: Extraocular movements intact.      Conjunctiva/sclera: Conjunctivae normal.      Pupils: Pupils are equal, round, and reactive to light.   Cardiovascular:      Rate and Rhythm: Normal rate and regular rhythm.      Heart sounds: No murmur heard.  Pulmonary:      Effort: Pulmonary effort is normal. No respiratory distress.      Breath sounds: Normal breath sounds.   Abdominal:      Palpations: Abdomen is soft.      Tenderness: There is no abdominal tenderness.   Musculoskeletal:         General: No swelling.      Cervical back: Normal range of motion and neck supple.   Skin:     General: Skin is warm and dry.      Capillary Refill: Capillary refill takes less than 2 seconds.   Neurological:      General: No focal deficit present.      Mental Status: She is alert and oriented to person, place, and time.   Psychiatric:         Mood and Affect: Mood normal.

## 2025-01-16 ENCOUNTER — RESULTS FOLLOW-UP (OUTPATIENT)
Dept: INTERNAL MEDICINE CLINIC | Facility: CLINIC | Age: 45
End: 2025-01-16

## 2025-01-16 LAB
ALBUMIN SERPL-MCNC: 4.3 G/DL (ref 3.6–5.1)
ALBUMIN/GLOB SERPL: 1.7 (CALC) (ref 1–2.5)
ALP SERPL-CCNC: 87 U/L (ref 31–125)
ALT SERPL-CCNC: 13 U/L (ref 6–29)
APPEARANCE UR: CLEAR
AST SERPL-CCNC: 16 U/L (ref 10–30)
BACTERIA UR QL AUTO: ABNORMAL /HPF
BASOPHILS # BLD AUTO: 41 CELLS/UL (ref 0–200)
BASOPHILS NFR BLD AUTO: 0.5 %
BILIRUB SERPL-MCNC: 0.3 MG/DL (ref 0.2–1.2)
BILIRUB UR QL STRIP: NEGATIVE
BUN SERPL-MCNC: 15 MG/DL (ref 7–25)
BUN/CREAT SERPL: ABNORMAL (CALC) (ref 6–22)
CALCIUM SERPL-MCNC: 9.3 MG/DL (ref 8.6–10.2)
CHLORIDE SERPL-SCNC: 100 MMOL/L (ref 98–110)
CHOLEST SERPL-MCNC: 284 MG/DL
CHOLEST/HDLC SERPL: 3.9 (CALC)
CO2 SERPL-SCNC: 22 MMOL/L (ref 20–32)
COLOR UR: YELLOW
CORTIS AM PEAK SERPL-MCNC: 30.5 MCG/DL
CREAT SERPL-MCNC: 0.67 MG/DL (ref 0.5–0.99)
EOSINOPHIL # BLD AUTO: 130 CELLS/UL (ref 15–500)
EOSINOPHIL NFR BLD AUTO: 1.6 %
ERYTHROCYTE [DISTWIDTH] IN BLOOD BY AUTOMATED COUNT: 12.7 % (ref 11–15)
GFR/BSA.PRED SERPLBLD CYS-BASED-ARV: 112 ML/MIN/1.73M2
GLOBULIN SER CALC-MCNC: 2.5 G/DL (CALC) (ref 1.9–3.7)
GLUCOSE SERPL-MCNC: 91 MG/DL (ref 65–99)
GLUCOSE UR QL STRIP: NEGATIVE
HCT VFR BLD AUTO: 35.9 % (ref 35–45)
HDLC SERPL-MCNC: 73 MG/DL
HGB BLD-MCNC: 11.6 G/DL (ref 11.7–15.5)
HGB UR QL STRIP: ABNORMAL
HYALINE CASTS #/AREA URNS LPF: ABNORMAL /LPF
KETONES UR QL STRIP: NEGATIVE
LDLC SERPL CALC-MCNC: 172 MG/DL (CALC)
LEUKOCYTE ESTERASE UR QL STRIP: ABNORMAL
LYMPHOCYTES # BLD AUTO: 3167 CELLS/UL (ref 850–3900)
LYMPHOCYTES NFR BLD AUTO: 39.1 %
MCH RBC QN AUTO: 29.2 PG (ref 27–33)
MCHC RBC AUTO-ENTMCNC: 32.3 G/DL (ref 32–36)
MCV RBC AUTO: 90.4 FL (ref 80–100)
MONOCYTES # BLD AUTO: 567 CELLS/UL (ref 200–950)
MONOCYTES NFR BLD AUTO: 7 %
NEUTROPHILS # BLD AUTO: 4196 CELLS/UL (ref 1500–7800)
NEUTROPHILS NFR BLD AUTO: 51.8 %
NITRITE UR QL STRIP: NEGATIVE
NONHDLC SERPL-MCNC: 211 MG/DL (CALC)
PH UR STRIP: 6.5 [PH] (ref 5–8)
PLATELET # BLD AUTO: 327 THOUSAND/UL (ref 140–400)
PMV BLD REES-ECKER: 10 FL (ref 7.5–12.5)
POTASSIUM SERPL-SCNC: 4.1 MMOL/L (ref 3.5–5.3)
PROT SERPL-MCNC: 6.8 G/DL (ref 6.1–8.1)
PROT UR QL STRIP: NEGATIVE
RBC # BLD AUTO: 3.97 MILLION/UL (ref 3.8–5.1)
RBC #/AREA URNS HPF: ABNORMAL /HPF
SODIUM SERPL-SCNC: 133 MMOL/L (ref 135–146)
SP GR UR STRIP: 1.01 (ref 1–1.03)
SQUAMOUS #/AREA URNS HPF: ABNORMAL /HPF
TRIGL SERPL-MCNC: 229 MG/DL
TSH SERPL-ACNC: 2.97 MIU/L
WBC # BLD AUTO: 8.1 THOUSAND/UL (ref 3.8–10.8)
WBC #/AREA URNS HPF: ABNORMAL /HPF

## 2025-02-05 ENCOUNTER — TELEPHONE (OUTPATIENT)
Age: 45
End: 2025-02-05

## 2025-02-05 NOTE — TELEPHONE ENCOUNTER
Relayed results to pt as per provider message. She would like to know if Dr. Ellison has any suggestions for managing the cholesterol and stated that if he feels like a visit would be best then she will do so but declined at this time.  Please contact to advise.

## 2025-02-17 ENCOUNTER — HOSPITAL ENCOUNTER (OUTPATIENT)
Dept: RADIOLOGY | Facility: IMAGING CENTER | Age: 45
Discharge: HOME | End: 2025-02-17
Payer: COMMERCIAL

## 2025-02-17 VITALS — BODY MASS INDEX: 25.4 KG/M2 | HEIGHT: 62 IN | WEIGHT: 138 LBS

## 2025-02-17 DIAGNOSIS — Z12.31 ENCOUNTER FOR SCREENING MAMMOGRAM FOR MALIGNANT NEOPLASM OF BREAST: ICD-10-CM

## 2025-02-17 PROCEDURE — 77063 BREAST TOMOSYNTHESIS BI: CPT

## 2025-02-17 PROCEDURE — 77067 SCR MAMMO BI INCL CAD: CPT

## 2025-03-30 ENCOUNTER — OFFICE VISIT (OUTPATIENT)
Dept: URGENT CARE | Facility: CLINIC | Age: 45
End: 2025-03-30
Payer: COMMERCIAL

## 2025-03-30 VITALS
RESPIRATION RATE: 16 BRPM | WEIGHT: 138 LBS | HEIGHT: 62 IN | SYSTOLIC BLOOD PRESSURE: 127 MMHG | TEMPERATURE: 97.8 F | OXYGEN SATURATION: 99 % | HEART RATE: 76 BPM | DIASTOLIC BLOOD PRESSURE: 65 MMHG | BODY MASS INDEX: 25.4 KG/M2

## 2025-03-30 DIAGNOSIS — H65.01 RIGHT ACUTE SEROUS OTITIS MEDIA, RECURRENCE NOT SPECIFIED: Primary | ICD-10-CM

## 2025-03-30 DIAGNOSIS — J03.90 EXUDATIVE TONSILLITIS: ICD-10-CM

## 2025-03-30 PROCEDURE — S9083 URGENT CARE CENTER GLOBAL: HCPCS | Performed by: PHYSICIAN ASSISTANT

## 2025-03-30 PROCEDURE — G0382 LEV 3 HOSP TYPE B ED VISIT: HCPCS | Performed by: PHYSICIAN ASSISTANT

## 2025-03-30 RX ORDER — AMOXICILLIN 500 MG/1
500 CAPSULE ORAL EVERY 8 HOURS SCHEDULED
Qty: 30 CAPSULE | Refills: 0 | Status: SHIPPED | OUTPATIENT
Start: 2025-03-30 | End: 2025-04-09

## 2025-03-30 NOTE — PATIENT INSTRUCTIONS
Take amoxicillin as prescribed.    Recommend over-the-counter fluticasone nasal spray, pseudoephedrine, Mucinex as needed for URI symptoms.  Consider saline sinus rinses as tolerated.  If symptoms or not improving in 2 to 3 days follow-up with PCP.  If symptoms worsen or new symptoms develop report to the emergency room.

## 2025-03-30 NOTE — PROGRESS NOTES
[facility]        NAME: Matt Sherwood is a 44 y.o. female  : 1980    MRN: [id number]  DATE: 2025  TIME: 10:04 AM    Assessment and Plan   Right acute serous otitis media, recurrence not specified [H65.01]  1. Right acute serous otitis media, recurrence not specified  amoxicillin (AMOXIL) 500 mg capsule      2. Exudative tonsillitis  amoxicillin (AMOXIL) 500 mg capsule      Patient presents with symptoms and examination consistent with viral URI however she does have exudates and tonsil tonsil swelling.  Recommend strep testing.  Rapid strep in clinic is negative however based on clinical appearance will treat empirically.  Patient does have evidence of developing right otitis media as well will treat with amoxicillin.    Medical Decision Making     PROBLEM: 1 acute, uncomplicated illness or injury    DATA: Test(s) Ordered: yes, Strep A     RISK: Prescription drug management    TIME: 15 minutes      Patient Instructions     Patient Instructions   Take amoxicillin as prescribed.    Recommend over-the-counter fluticasone nasal spray, pseudoephedrine, Mucinex as needed for URI symptoms.  Consider saline sinus rinses as tolerated.  If symptoms or not improving in 2 to 3 days follow-up with PCP.  If symptoms worsen or new symptoms develop report to the emergency room.    Follow up with PCP in 3-5 days.  Proceed to  ER if symptoms worsen.    If tests have been performed at [facility], our office will contact you with results if changes need to be made to the care plan discussed with you at the visit. [facility]     Chief Complaint     Chief Complaint   Patient presents with    Cold Like Symptoms    Cough     URI s/s with cough and congestion.          History of Present Illness       44-year-old female presents complaint of right ear pain, sinus congestion, and cough for approximately 3 days duration.  Patient also notes sore throat.  Sore throat symptoms  are worse with movement this time.  Patient denies fever and chills denies chest pain and shortness of breath.  She notes that she was recently in [place] and traveled back.  She states that often she will get a sore throat and ear discomfort when she travels but usually goes away in 1 to 2 days and this is not improving.    Cough  Associated symptoms include ear pain, postnasal drip, rhinorrhea and a sore throat. Pertinent negatives include no chest pain, chills, fever or shortness of breath.       Review of Systems   Review of Systems   Constitutional:  Negative for chills and fever.   HENT:  Positive for congestion, ear pain, postnasal drip, rhinorrhea and sore throat. Negative for trouble swallowing and voice change.    Respiratory:  Positive for cough. Negative for chest tightness and shortness of breath.    Cardiovascular:  Negative for chest pain.         Current Medications       Current Outpatient Medications:     amoxicillin (AMOXIL) 500 mg capsule, Take 1 capsule (500 mg total) by mouth every 8 (eight) hours for 10 days, Disp: 30 capsule, Rfl: 0    cetirizine (ZyrTEC) 10 mg tablet, Take 10 mg by mouth in the morning., Disp: , Rfl:     cyanocobalamin (VITAMIN B-12) 100 MCG tablet, Take 100 mcg by mouth daily, Disp: , Rfl:     drospirenone-ethinyl estradiol (Vestura) 3-0.02 MG per tablet, Take 1 tablet by mouth daily, Disp: 90 tablet, Rfl: 3    multivitamin (THERAGRAN) TABS, Take 1 tablet by mouth daily, Disp: , Rfl:     pantoprazole (PROTONIX) 40 mg tablet, Take 1 tablet (40 mg total) by mouth daily (Patient taking differently: Take 40 mg by mouth if needed), Disp: 30 tablet, Rfl: 0    triamcinolone (KENALOG) 0.1 % cream, Apply topically 2 (two) times a day (Patient not taking: Reported on 12/22/2023), Disp: 80 g, Rfl: 0    valACYclovir (VALTREX) 1,000 mg tablet, Take 2 tablets (2,000 mg total) by mouth 2 (two) times a day for 1 day, Disp: 4 tablet, Rfl: 1    Current Allergies     Allergies as of  2025 - Reviewed 2025   Allergen Reaction Noted    Pollen extract Allergic Rhinitis 2013            The following portions of the patient's history were reviewed and updated as appropriate: allergies, current medications, past family history, past medical history, past social history, past surgical history and problem list.     Past Medical History:   Diagnosis Date    Abnormal Pap smear of cervix     with atypical squamous cells of undetermined sing (ASC-US)    Allergic rhinitis      delivery delivered 2016    Genital warts     HPV in female     Mild cervical dysplasia     Moderate dysplasia of cervix (ALISHA II)     Seasonal allergies     Subserosal leiomyoma of uterus     resolved     Varicella     had as child    Varicose veins of vulva and perineum 2018    Visual impairment     eyewear       Past Surgical History:   Procedure Laterality Date    BREAST BIOPSY Right 2022    neg stereo    CERVICAL BIOPSY  W/ LOOP ELECTRODE EXCISION       SECTION      COLPOSCOPY      HIATAL HERNIA REPAIR      MAMMO STEREOTACTIC BREAST BIOPSY RIGHT (ALL INC) Right 2022    MYOMECTOMY      resolved     [place]  DELIV[place] ONLY N/A 2016    2016 daughter    [place] N/[place] 2018    Procedure:  SECTION () REPEAT;  Surgeon: Flip Larios MD;  Location: [facility] ;  Service: Obstetrics    VENTRAL HERNIA REPAIR      WISDOM TOOTH EXTRACTION         Family History   Problem Relation Age of Onset    Cancer Mother         skin melanoma    Cancer Father         Brain    Other Father         Glomus Jugulare Tumor    Cancer Paternal Aunt         breast as per allscripts    Breast cancer Paternal Aunt 58    Breast cancer additional onset Paternal Aunt     Diabetes Maternal Grandmother     Heart disease Maternal Grandmother         triple bypass surgery    Mental retardation Cousin     Alzheimer's disease Paternal Grandfather     Cancer Paternal  "Grandfather         colon    Colon cancer Paternal Grandfather     No Known Problems Daughter     No Known Problems Son     Esophageal cancer Maternal Grandfather 90    No Known Problems Paternal Grandmother     No Known Problems Sister     No Known Problems Sister     No Known Problems Maternal Aunt     No Known Problems Maternal Aunt     No Known Problems Maternal Uncle     No Known Problems Paternal Uncle     Endometrial cancer Neg Hx     Ovarian cancer Neg Hx          Medications have been verified.        Objective   /65   Pulse 76   Temp 97.8 °F (36.6 °C)   Resp 16   Ht 5' 2\" (1.575 m)   Wt 62.6 kg (138 lb)   SpO2 99%   BMI 25.24 kg/m²   No LMP recorded. (Menstrual status: Birth Control).       Physical Exam     Physical Exam  Vitals and nursing note reviewed.   Constitutional:       General: She is not in acute distress.     Appearance: Normal appearance. She is not ill-appearing or toxic-appearing.   HENT:      Head: Normocephalic and atraumatic.      Jaw: No trismus.      Right Ear: Hearing, ear canal and external ear normal. A middle ear effusion is present. There is no impacted cerumen. No foreign body. Tympanic membrane is injected and bulging. Tympanic membrane is not scarred, perforated, erythematous or retracted.      Left Ear: Ear canal and external ear normal. A middle ear effusion is present. There is no impacted cerumen. No foreign body. Tympanic membrane is not injected, scarred, perforated, erythematous, retracted or bulging.      Nose: Mucosal edema, congestion and rhinorrhea present. No nasal deformity. Rhinorrhea is clear.      Right Nostril: No foreign body, epistaxis or occlusion.      Left Nostril: No foreign body, epistaxis or occlusion.      Right Turbinates: Not enlarged, swollen or pale.      Left Turbinates: Not enlarged, swollen or pale.      Mouth/Throat:      Lips: Pink. No lesions.      Mouth: Mucous membranes are moist. No injury, oral lesions or angioedema.      " "Dentition: Normal dentition.      Tongue: No lesions. Tongue does not deviate from midline.      Palate: No mass and lesions.      Pharynx: Uvula midline. Postnasal drip present. No pharyngeal swelling, oropharyngeal exudate, posterior oropharyngeal erythema or uvula swelling.      Tonsils: Tonsillar exudate present. No tonsillar abscesses. 1+ on the right. 1+ on the left.   Eyes:      General: Lids are normal. Gaze aligned appropriately. No allergic shiner.     Extraocular Movements: Extraocular movements intact.   Cardiovascular:      Rate and Rhythm: Normal rate and regular rhythm.      Heart sounds: Normal heart sounds, S1 normal and S2 normal.   Pulmonary:      Effort: Pulmonary effort is normal.      Breath sounds: Normal breath sounds.      Comments: Patient speaking in full sentences with no increased respiratory effort.   No audible wheezing or stridor.   Lymphadenopathy:      Cervical: Cervical adenopathy present.      Right cervical: Superficial cervical adenopathy present. No deep or posterior cervical adenopathy.     Left cervical: Superficial cervical adenopathy present. No deep or posterior cervical adenopathy.   Skin:     General: Skin is warm and dry.   Neurological:      Mental Status: She is alert and oriented to person, place, and time.      Coordination: Coordination is intact.      Gait: Gait is intact.   Psychiatric:         Attention and Perception: Attention and perception normal.         Mood and Affect: Mood and affect normal.         Speech: Speech normal.         Behavior: Behavior is cooperative.               Note: Portions of this record may have been created with voice recognition software. Occasional wrong word or \"sound a like\" substitutions may have occurred due to the inherent limitations of voice recognition software. Please read the chart carefully and recognize, using context, where substitutions have occurred.*      "

## 2025-05-01 ENCOUNTER — HOSPITAL ENCOUNTER (OUTPATIENT)
Dept: RADIOLOGY | Facility: HOSPITAL | Age: 45
Discharge: HOME | End: 2025-05-01
Payer: COMMERCIAL

## 2025-05-01 DIAGNOSIS — R92.8 ABNORMAL SCREENING MAMMOGRAM: ICD-10-CM

## 2025-05-01 PROCEDURE — 76642 ULTRASOUND BREAST LIMITED: CPT

## 2025-05-01 PROCEDURE — 77065 DX MAMMO INCL CAD UNI: CPT

## 2025-05-01 PROCEDURE — G0279 TOMOSYNTHESIS, MAMMO: HCPCS

## 2025-05-24 DIAGNOSIS — Z30.41 SURVEILLANCE OF CONTRACEPTIVE PILL: ICD-10-CM

## 2025-05-27 RX ORDER — DROSPIRENONE AND ETHINYL ESTRADIOL 0.02-3(28)
1 KIT ORAL DAILY
Qty: 28 TABLET | Refills: 0 | Status: SHIPPED | OUTPATIENT
Start: 2025-05-27

## 2025-06-10 DIAGNOSIS — Z30.41 SURVEILLANCE OF CONTRACEPTIVE PILL: ICD-10-CM

## 2025-06-10 RX ORDER — DROSPIRENONE AND ETHINYL ESTRADIOL 0.02-3(28)
1 KIT ORAL DAILY
Qty: 28 TABLET | Refills: 0 | Status: SHIPPED | OUTPATIENT
Start: 2025-06-10

## 2025-06-10 NOTE — TELEPHONE ENCOUNTER
Medication: drospirenone-ethinyl estradiol (Vestura) 3-0.02 MG per tablet     Dose/Frequency: TAKE 1 TABLET BY MOUTH EVERY DAY     Quantity: 28 Tablets    Pharmacy: [facility]/pharmacy #[id number] - [place] - [address] [place] [address].     Office:   [] PCP/Provider -   [x] Speciality/Provider -  Claire Black MD     Does the patient have enough for 3 days?   [x] Yes   [] No - Send as HP to POD    **Patient is scheduled for yearly visit on 7/28/25 with Dr. Black and will not have enough until then.

## 2025-06-27 ENCOUNTER — TELEPHONE (OUTPATIENT)
Age: 45
End: 2025-06-27

## 2025-06-27 DIAGNOSIS — T75.3XXA MOTION SICKNESS, INITIAL ENCOUNTER: Primary | ICD-10-CM

## 2025-06-27 RX ORDER — SCOPOLAMINE 1 MG/3D
1 PATCH, EXTENDED RELEASE TRANSDERMAL
Qty: 2 PATCH | Refills: 0 | Status: SHIPPED | OUTPATIENT
Start: 2025-06-27

## 2025-06-27 NOTE — TELEPHONE ENCOUNTER
Patient is calling to ask provider if he can prescribe something for motion sickness, she will be going on a boating trip next week and needs something in adition to the dramamine that she already takes    She spoke to a few people and is inquiring about a patch    Please review and advise

## 2025-07-14 DIAGNOSIS — Z30.41 SURVEILLANCE OF CONTRACEPTIVE PILL: ICD-10-CM

## 2025-07-15 RX ORDER — DROSPIRENONE AND ETHINYL ESTRADIOL 0.02-3(28)
1 KIT ORAL DAILY
Qty: 84 TABLET | Refills: 1 | Status: SHIPPED | OUTPATIENT
Start: 2025-07-15

## 2025-07-21 DIAGNOSIS — Z30.41 SURVEILLANCE OF CONTRACEPTIVE PILL: ICD-10-CM

## 2025-07-21 RX ORDER — DROSPIRENONE AND ETHINYL ESTRADIOL 0.02-3(28)
1 KIT ORAL DAILY
Qty: 84 TABLET | Refills: 1 | OUTPATIENT
Start: 2025-07-21

## 2025-07-21 NOTE — TELEPHONE ENCOUNTER
Pt called to reschedule annual due to menstrual 7/28 to 09/03. Pt needs BC refilled until visit. Pt made aware of message sent.

## 2025-08-15 ENCOUNTER — OFFICE VISIT (OUTPATIENT)
Dept: FAMILY MEDICINE CLINIC | Facility: CLINIC | Age: 45
End: 2025-08-15
Payer: COMMERCIAL

## 2025-08-15 PROBLEM — E78.2 MIXED HYPERLIPIDEMIA: Status: ACTIVE | Noted: 2025-08-15

## (undated) DEVICE — SUT PLAIN 2-0 CTX 27 IN 872H

## (undated) DEVICE — ADHESIVE SKN CLSR HISTOACRYL FLEX 0.5ML LF

## (undated) DEVICE — SUT VICRYL 4-0 KS 27 IN J662H

## (undated) DEVICE — Device

## (undated) DEVICE — PACK C-SECTION PBDS

## (undated) DEVICE — ABDOMINAL PAD: Brand: DERMACEA

## (undated) DEVICE — CHLORAPREP HI-LITE 26ML ORANGE

## (undated) DEVICE — SUT MONOCRYL 0 CTX 36 IN Y398H

## (undated) DEVICE — TELFA NON-ADHERENT ABSORBENT DRESSING: Brand: TELFA

## (undated) DEVICE — GLOVE SRG BIOGEL ECLIPSE 8

## (undated) DEVICE — MEDI-VAC YANKAUER SUCTION HANDLE W/STRAIGHT TIP & CONTROL VENT: Brand: CARDINAL HEALTH

## (undated) DEVICE — GAUZE SPONGES,16 PLY: Brand: CURITY

## (undated) DEVICE — SUT VICRYL 0 CT-1 27 IN J260H

## (undated) DEVICE — SKIN MARKER DUAL TIP WITH RULER CAP, FLEXIBLE RULER AND LABELS: Brand: DEVON